# Patient Record
Sex: MALE | Race: WHITE | NOT HISPANIC OR LATINO | Employment: OTHER | URBAN - METROPOLITAN AREA
[De-identification: names, ages, dates, MRNs, and addresses within clinical notes are randomized per-mention and may not be internally consistent; named-entity substitution may affect disease eponyms.]

---

## 2017-01-05 ENCOUNTER — OFFICE VISIT (OUTPATIENT)
Dept: OPHTHALMOLOGY | Facility: CLINIC | Age: 67
End: 2017-01-05
Payer: MEDICARE

## 2017-01-05 DIAGNOSIS — H52.4 HYPEROPIA WITH ASTIGMATISM AND PRESBYOPIA, UNSPECIFIED LATERALITY: ICD-10-CM

## 2017-01-05 DIAGNOSIS — H52.209 HYPEROPIA WITH ASTIGMATISM AND PRESBYOPIA, UNSPECIFIED LATERALITY: ICD-10-CM

## 2017-01-05 DIAGNOSIS — H52.00 HYPEROPIA WITH ASTIGMATISM AND PRESBYOPIA, UNSPECIFIED LATERALITY: ICD-10-CM

## 2017-01-05 DIAGNOSIS — H25.13 NUCLEAR SCLEROSIS, BILATERAL: Primary | ICD-10-CM

## 2017-01-05 PROCEDURE — 92004 COMPRE OPH EXAM NEW PT 1/>: CPT | Mod: S$GLB,,, | Performed by: OPTOMETRIST

## 2017-01-05 PROCEDURE — 99999 PR PBB SHADOW E&M-EST. PATIENT-LVL I: CPT | Mod: PBBFAC,,, | Performed by: OPTOMETRIST

## 2017-01-05 PROCEDURE — 92015 DETERMINE REFRACTIVE STATE: CPT | Mod: S$GLB,,, | Performed by: OPTOMETRIST

## 2017-01-05 PROCEDURE — 99211 OFF/OP EST MAY X REQ PHY/QHP: CPT | Mod: PBBFAC,PO | Performed by: OPTOMETRIST

## 2017-01-05 NOTE — PROGRESS NOTES
HPI     Pts last exam was 4 years ago. PT c/o overall blurred va and wears gls   full time. Gls recently broke, would like to replace.   HPI    Any vision changes since last exam: decreased near va  Eye pain: no  Other ocular symptoms: no    Do you wear currently wear glasses or contacts? gls    Interested in contacts today? no    Do you plan on getting new glasses today? If needed         Last edited by Amirah Sy MA on 1/5/2017  2:31 PM.         Assessment /Plan     For exam results, see Encounter Report.    Nuclear sclerosis, bilateral  Very mild with excellent va OU  Surgery is not indicated at this time. Monitor 12 months.    Hyperopia with astigmatism and presbyopia, unspecified laterality  Eyeglass Final Rx     Eyeglass Final Rx      Sphere Cylinder Axis Add   Right +1.75 +0.50 180 +2.25   Left +2.00 +0.75 178 +2.25       Expiration Date:  1/6/2018            RTC 1 yr for dilated eye exam or PRN if any problems.   Discussed above and answered questions.

## 2017-03-13 ENCOUNTER — OFFICE VISIT (OUTPATIENT)
Dept: INTERNAL MEDICINE | Facility: CLINIC | Age: 67
End: 2017-03-13
Payer: MEDICARE

## 2017-03-13 ENCOUNTER — APPOINTMENT (OUTPATIENT)
Dept: RADIOLOGY | Facility: HOSPITAL | Age: 67
End: 2017-03-13
Attending: FAMILY MEDICINE
Payer: MEDICARE

## 2017-03-13 VITALS
WEIGHT: 207.25 LBS | TEMPERATURE: 98 F | HEART RATE: 91 BPM | OXYGEN SATURATION: 95 % | SYSTOLIC BLOOD PRESSURE: 119 MMHG | BODY MASS INDEX: 31.51 KG/M2 | DIASTOLIC BLOOD PRESSURE: 81 MMHG

## 2017-03-13 DIAGNOSIS — I42.9 CARDIOMYOPATHY, UNSPECIFIED TYPE: ICD-10-CM

## 2017-03-13 DIAGNOSIS — I48.20 ATRIAL FIBRILLATION, CHRONIC: ICD-10-CM

## 2017-03-13 DIAGNOSIS — M17.10 ARTHRITIS OF KNEE: Primary | ICD-10-CM

## 2017-03-13 DIAGNOSIS — M17.10 ARTHRITIS OF KNEE: ICD-10-CM

## 2017-03-13 PROCEDURE — 99213 OFFICE O/P EST LOW 20 MIN: CPT | Mod: S$GLB,,, | Performed by: FAMILY MEDICINE

## 2017-03-13 PROCEDURE — 73562 X-RAY EXAM OF KNEE 3: CPT | Mod: 26,LT,, | Performed by: RADIOLOGY

## 2017-03-13 PROCEDURE — 73560 X-RAY EXAM OF KNEE 1 OR 2: CPT | Mod: TC,59,PO,RT

## 2017-03-13 PROCEDURE — 73560 X-RAY EXAM OF KNEE 1 OR 2: CPT | Mod: 26,59,RT, | Performed by: RADIOLOGY

## 2017-03-13 PROCEDURE — 99999 PR PBB SHADOW E&M-EST. PATIENT-LVL III: CPT | Mod: PBBFAC,,, | Performed by: FAMILY MEDICINE

## 2017-03-13 RX ORDER — METHYLPREDNISOLONE 4 MG/1
TABLET ORAL
Qty: 1 PACKAGE | Refills: 0 | Status: SHIPPED | OUTPATIENT
Start: 2017-03-13 | End: 2017-04-03

## 2017-03-13 NOTE — MR AVS SNAPSHOT
[facility]  [address]  [place] [address]  Phone: [phone]  Fax: [phone]                  Ezio James   3/13/2017 2:30 PM   Office Visit    Description:  Male : 1950   Provider:  Darren Nova MD   Department:  [facility]           Reason for Visit     left knee           Diagnoses this Visit        Comments    Arthritis of knee    -  Primary     Cardiomyopathy, unspecified type         Atrial fibrillation, chronic                To Do List           Future Appointments        Provider Department Dept Phone    3/28/2017 2:30 PM Darren Nova MD [facility] [phone]      Goals (5 Years of Data)     None      Follow-Up and Disposition     Return in about 2 weeks (around 3/27/2017).    Follow-up and Disposition History       These Medications        Disp Refills Start End    methylPREDNISolone (MEDROL DOSEPACK) 4 mg tablet 1 Package 0 3/13/2017 4/3/2017    use as directed    Pharmacy: [facility] [address] [place] [address] AT [facility] Ph #: [phone]         [facility]     [facility] On Call Nurse [facility] Line -  Assistance  Registered nurses in the [facility] provide clinical advisement, health education, appointment booking, and other advisory services.  Call for this free service at [phone].             Medications           START taking these NEW medications        Refills    methylPREDNISolone (MEDROL DOSEPACK) 4 mg tablet 0    Sig: use as directed    Class: Normal           Verify that the below list of medications is an accurate representation of the medications you are currently taking.  If none reported, the list may be blank. If incorrect, please contact your healthcare provider. Carry this list with you in case of emergency.           Current Medications     levothyroxine (SYNTHROID) 75 MCG tablet Take 1 tablet (75 mcg total) by  mouth once daily.    lisinopril (PRINIVIL,ZESTRIL) 5 MG tablet TAKE 1 TABLET ONE TIME DAILY    metoprolol succinate (TOPROL-XL) 50 MG 24 hr tablet TAKE 1 TABLET EVERY DAY    multivitamin capsule Take 1 capsule by mouth once daily.    aspirin (ECOTRIN) 81 MG EC tablet Take 1 tablet (81 mg total) by mouth once daily.    methylPREDNISolone (MEDROL DOSEPACK) 4 mg tablet use as directed           Clinical Reference Information           Your Vitals Were     BP Pulse Temp Weight SpO2 BMI    119/81 (BP Location: Right arm, Patient Position: Sitting, BP Method: Automatic) 91 98.2 °F (36.8 °C) (Tympanic) 94 kg (207 lb 3.7 oz) 95% 31.51 kg/m2      Blood Pressure          Most Recent Value    BP  119/81      Allergies as of 3/13/2017     No Known Allergies      Immunizations Administered on Date of Encounter - 3/13/2017     None      Orders Placed During Today's Visit      Normal Orders This Visit    Ambulatory referral to Cardiology     Uric acid     Future Labs/Procedures Expected by Expires    X-ray Knee Ortho Left  3/13/2017 3/13/2018      [facility] Sign-Up     Activating your [facility] account is as easy as 1-2-3!     1) Visit [email or web address], select Sign Up Now, enter this activation code and your date of birth, then select Next.  [email or web address]  Expires: 4/27/2017  3:31 PM      2) Create a username and password to use when you visit [facility] in the future and select a security question in case you lose your password and select Next.    3) Enter your e-mail address and click Sign Up!    Additional Information  If you have questions, please e-mail [email or web address] or call [phone] to talk to our [facility] staff. Remember, [facility] is NOT to be used for urgent needs. For medical emergencies, dial 911.         Language Assistance Services     ATTENTION: Language assistance services are available, free of charge. Please call [phone].      ATENCIÓN: Si habla español, tiene a mancia disposición servicios  josesitoos de asistencia lingüística. Cosme jean-baptiste [phone].     SUZI Ý: N?u b?n nói Ti?ng Vi?t, có các d?ch v? h? tr? ngôn ng? mi?n phí dành cho b?n. G?i s? [phone].         [facility] complies with applicable Federal civil rights laws and does not discriminate on the basis of race, color, national origin, age, disability, or sex.

## 2017-03-13 NOTE — PROGRESS NOTES
Subjective:       Patient ID: Ezio James is a 66 y.o. male.    Chief Complaint: left knee    HPI Comments: 1 month duration of mild intermittent discomfort of the left knee.  Several days' duration of constant pain and swelling.  He denies any previous joint symptoms.  He is not on any diuretics.  He denies any trauma or increase usage.    Review of Systems   Musculoskeletal: Positive for arthralgias.        Pain and swelling of the left knee       Objective:      Physical Exam   Musculoskeletal:   Left knee is diffusely swollen and diffusely increased heat.  There is no redness.  Some mild discomfort with full extension.  There is no rotational pain.  There is no bruising.       Assessment:       1. Arthritis of knee    2. Cardiomyopathy, unspecified type    3. Atrial fibrillation, chronic        Plan:        Acute arthritis.  X-ray knee uric acid.  Medrol Dosepak back as well as ice packs twice a day.  Follow-up in 2 weeks.  He requested cardiology follow-up routine for cardiomyopathy and atrial fib

## 2017-03-14 LAB — URATE SERPL-MCNC: 7.2 MG/DL

## 2017-03-28 ENCOUNTER — OFFICE VISIT (OUTPATIENT)
Dept: INTERNAL MEDICINE | Facility: CLINIC | Age: 67
End: 2017-03-28
Payer: MEDICARE

## 2017-03-28 VITALS
TEMPERATURE: 98 F | WEIGHT: 204.5 LBS | SYSTOLIC BLOOD PRESSURE: 129 MMHG | OXYGEN SATURATION: 97 % | HEART RATE: 92 BPM | DIASTOLIC BLOOD PRESSURE: 91 MMHG | BODY MASS INDEX: 31.09 KG/M2

## 2017-03-28 DIAGNOSIS — I42.9 CARDIOMYOPATHY, UNSPECIFIED TYPE: ICD-10-CM

## 2017-03-28 DIAGNOSIS — I48.20 ATRIAL FIBRILLATION, CHRONIC: ICD-10-CM

## 2017-03-28 DIAGNOSIS — M10.071 ACUTE IDIOPATHIC GOUT OF RIGHT FOOT: Primary | ICD-10-CM

## 2017-03-28 DIAGNOSIS — E03.4 HYPOTHYROIDISM DUE TO ACQUIRED ATROPHY OF THYROID: ICD-10-CM

## 2017-03-28 PROCEDURE — 99213 OFFICE O/P EST LOW 20 MIN: CPT | Mod: S$GLB,,, | Performed by: FAMILY MEDICINE

## 2017-03-28 PROCEDURE — 99999 PR PBB SHADOW E&M-EST. PATIENT-LVL III: CPT | Mod: PBBFAC,,, | Performed by: FAMILY MEDICINE

## 2017-03-28 RX ORDER — COLCHICINE 0.6 MG/1
0.6 TABLET ORAL 2 TIMES DAILY
Qty: 60 TABLET | Refills: 2 | Status: SHIPPED | OUTPATIENT
Start: 2017-03-28 | End: 2017-07-05 | Stop reason: ALTCHOICE

## 2017-03-28 RX ORDER — METOPROLOL SUCCINATE 50 MG/1
50 TABLET, EXTENDED RELEASE ORAL DAILY
Qty: 90 TABLET | Refills: 3 | Status: SHIPPED | OUTPATIENT
Start: 2017-03-28 | End: 2018-03-29 | Stop reason: SDUPTHER

## 2017-03-28 RX ORDER — LISINOPRIL 5 MG/1
TABLET ORAL
Qty: 90 TABLET | Refills: 3 | Status: SHIPPED | OUTPATIENT
Start: 2017-03-28 | End: 2017-06-14 | Stop reason: ALTCHOICE

## 2017-03-28 RX ORDER — LEVOTHYROXINE SODIUM 75 UG/1
75 TABLET ORAL DAILY
Qty: 90 TABLET | Refills: 3 | Status: SHIPPED | OUTPATIENT
Start: 2017-03-28 | End: 2018-03-29 | Stop reason: SDUPTHER

## 2017-03-28 NOTE — PROGRESS NOTES
Subjective:       Patient ID: Ezio James is a 66 y.o. male.    Chief Complaint: 2wk follow up and right foot swollen and tender    HPI Comments: Left knee pain and swelling resolved prior to taking Medrol Dosepak.  He did complete the Medrol Dosepak.  Subsequently developed pain and swelling right foot.  Denies any trauma.    Review of Systems   Constitutional: Negative for chills and fever.   Respiratory: Negative for cough and shortness of breath.    Cardiovascular: Negative for chest pain and palpitations.   Musculoskeletal: Positive for arthralgias.   Skin: Negative for rash.       Objective:      Physical Exam   Constitutional: He appears well-developed and well-nourished.   Cardiovascular: Normal rate and normal heart sounds.    No murmur heard.  A  fib controlled rate   Pulmonary/Chest: Effort normal and breath sounds normal. No respiratory distress.   Musculoskeletal:   Left knee is without pain or swelling.  Is no redness.  Right first MTP joint is mildly tender mildly swollen with mild pain on range of motion.       Assessment:       1. Acute idiopathic gout of right foot    2. Hypothyroidism due to acquired atrophy of thyroid    3. Cardiomyopathy, unspecified type    4. Atrial fibrillation, chronic        Plan:    .  Uric acid was mildly elevated during acute attacks of gout.  Start colchicine 0.6 mg twice a day.  Ice to the right toe area twice a day.  Discussed gout Diet.  Follow-up in one month and check uric acid.  Consider allopurinol at that time.  Routine medications were refilled due to insurance change.

## 2017-03-28 NOTE — MR AVS SNAPSHOT
[facility]  [address] [address]  Phone: [phone]  Fax: [phone]                  Ezio James   3/28/2017 2:30 PM   Office Visit    Description:  Male : 1950   Provider:  Darren Nova MD   Department:  [facility]           Reason for Visit     2wk follow up     right foot swollen and tender           Diagnoses this Visit        Comments    Acute idiopathic gout of right foot    -  Primary     Hypothyroidism due to acquired atrophy of thyroid         Cardiomyopathy, unspecified type         Atrial fibrillation, chronic                To Do List           Future Appointments        Provider Department Dept Phone    2017 2:30 PM Darren Nova MD [facility] [phone]      Goals (5 Years of Data)     None      Follow-Up and Disposition     Return in about 1 month (around 2017).       These Medications        Disp Refills Start End    colchicine 0.6 mg tablet 60 tablet 2 3/28/2017 3/28/2018    Take 1 tablet (0.6 mg total) by mouth 2 (two) times daily. - Oral    Pharmacy: [id number] - [address] AT [facility] Ph #: [phone]       levothyroxine (SYNTHROID) 75 MCG tablet 90 tablet 3 3/28/2017 3/28/2018    Take 1 tablet (75 mcg total) by mouth once daily. - Oral    Pharmacy: [facility] - [address] Ph #: [phone]       lisinopril (PRINIVIL,ZESTRIL) 5 MG tablet 90 tablet 3 3/28/2017     TAKE 1 TABLET ONE TIME DAILY    Pharmacy: [facility] - [address] Ph #: [phone]       metoprolol succinate (TOPROL-XL) 50 MG 24 hr tablet 90 tablet 3 3/28/2017     Take 1 tablet (50 mg total) by mouth once daily. - Oral    Pharmacy: [facility] - [address] Ph #: [phone]         [facility]     [facility] Nurse Care Line - 24/7 Assistance  Registered nurses in the [facility] provide clinical advisement, health education, appointment booking, and other advisory services.  Call for this free service at [phone].             Medications           START taking these NEW medications        Refills    colchicine 0.6 mg tablet 2    Sig: Take 1 tablet (0.6 mg total) by mouth 2 (two) times daily.    Class: Normal    Route: Oral      CHANGE how you are taking these medications     Start Taking Instead of    metoprolol succinate (TOPROL-XL) 50 MG 24 hr tablet metoprolol succinate (TOPROL-XL) 50 MG 24 hr tablet    Dosage:  Take 1 tablet (50 mg total) by mouth once daily. Dosage:  TAKE 1 TABLET EVERY DAY    Reason for Change:  Reorder            Verify that the below list of medications is an accurate representation of the medications you are currently taking.  If none reported, the list may be blank. If incorrect, please contact your healthcare provider. Carry this list with you in case of emergency.           Current Medications     levothyroxine (SYNTHROID) 75 MCG tablet Take 1 tablet (75 mcg total) by mouth once daily.    lisinopril (PRINIVIL,ZESTRIL) 5 MG tablet TAKE 1 TABLET ONE TIME DAILY    metoprolol succinate (TOPROL-XL) 50 MG 24 hr tablet Take 1 tablet (50 mg total) by mouth once daily.    multivitamin capsule Take 1 capsule by mouth once daily.    aspirin (ECOTRIN) 81 MG EC tablet Take 1 tablet (81 mg total) by mouth once daily.    colchicine 0.6 mg tablet Take 1 tablet (0.6 mg total) by mouth 2 (two) times daily.    methylPREDNISolone (MEDROL DOSEPACK) 4 mg tablet use as directed           Clinical Reference Information           Your Vitals Were     BP Pulse Temp    129/91 (BP Location: Right arm, Patient Position: Sitting, BP Method: Automatic) 92 97.7 °F (36.5 °C) (Tympanic)    Weight SpO2 BMI    92.8 kg (204 lb 7.6 oz) 97% 31.09 kg/m2      Blood Pressure          Most Recent Value    BP   (!)  129/91      Allergies as of 3/28/2017     No Known Allergies      Immunizations Administered on Date of Encounter - 3/28/2017     None      [facility] Sign-Up     Activating your [facility] account is as easy as 1-2-3!     1) Visit [email or web address], select Sign Up Now, enter this activation code and your date of birth, then select Next.  [email or web address]  Expires: 4/27/2017  3:31 PM      2) Create a username and password to use when you visit [facility] in the future and select a security question in case you lose your password and select Next.    3) Enter your e-mail address and click Sign Up!    Additional Information  If you have questions, please e-mail [email or web address] or call [phone] to talk to our [facility] staff. Remember, [facility] is NOT to be used for urgent needs. For medical emergencies, dial 911.         Instructions      Gout Diet  Gout is a painful condition caused by an excess of uric acid, a waste product made by the body. Uric acid forms crystals that collect in the joints. The immune response to these crystals brings on symptoms of joint pain and swelling. This is called a gout attack. Often, medications and diet changes are combined to manage gout. Below are some guidelines for changing your diet to help you manage gout and prevent attacks. Your health care provider will help you determine the best eating plan for you.     Eating to manage gout  Weight loss for those who are overweight may help reduce gout attacks.  Eat less of these foods  Eating too many foods containing purines may raise the levels of uric acid in your body. This raises your risk for a gout attack. Try to limit these foods and drinks:  · Alcohol, such as beer and red wine. You may be told to avoid alcohol completely.  · Soft drinks that contain sugar or high fructose corn syrup  · Certain fish, including anchovies, sardines, fish eggs, and herring  · Shellfish  · Certain meats, such as red meat, hot dogs, luncheon  meats, and turkey  · Organ meats, such as liver, kidneys, and sweetbreads  · Legumes, such as dried beans and peas  · Other high fat foods such as gravy, whole milk, and high fat cheeses  · Vegetables such as asparagus, cauliflower, spinach, and mushrooms used to be thought to contribute to an increased risk for a gout attack, but recent studies show that high purine vegetables don't increase the risk for a gout attack.  Eat more of these foods  Other foods may be helpful for people with gout. Add some of these foods to your diet:  · Cherries contain chemicals that may lower uric acid.  · Omega fatty acids. These are found in some fatty fish such as salmon, certain oils (flax, olive, or nut), and nuts themselves. Omega fatty acids may help prevent inflammation due to gout.  · Dairy products that are low-fat or fat-free, such as cheese and yogurt  · Complex carbohydrate foods, including whole grains, brown rice, oats, and beans  · Coffee, in moderation  · Water, approximately 64 ounces per day  Follow-up care  Follow up with your healthcare provider as advised.  When to seek medical advice  Call your healthcare provider right away if any of these occur:  · Return of gout symptoms, usually at night:  · Severe pain, swelling, and heat in a joint, especially the base of the big toe  · Affected joint is hard to move  · Skin of the affected joint is purple or red  · Fever of 100.4°F (38°C) or higher  · Pain that doesn't get better even with prescribed medicine   Date Last Reviewed: 1/12/2016 © 2000-2016 [employer]. [address] [place] [address], [place] [address]. All rights reserved. This information is not intended as a substitute for professional medical care. Always follow your healthcare professional's instructions.             Language Assistance Services     ATTENTION: Language assistance services are available, free of charge. Please call [phone].      ATENCIÓN: ana Payne  disposición servicios gratuitos de asistencia lingüística. Cosme al [phone].     SUZI Ý: N?u b?n nói Ti?ng Vi?t, có các d?ch v? h? tr? ngôn ng? mi?n phí dành cho b?n. G?i s? [phone].         [facility] complies with applicable Federal civil rights laws and does not discriminate on the basis of race, color, national origin, age, disability, or sex.

## 2017-03-28 NOTE — PATIENT INSTRUCTIONS

## 2017-04-27 ENCOUNTER — OFFICE VISIT (OUTPATIENT)
Dept: INTERNAL MEDICINE | Facility: CLINIC | Age: 67
End: 2017-04-27
Payer: COMMERCIAL

## 2017-04-27 VITALS
HEIGHT: 68 IN | DIASTOLIC BLOOD PRESSURE: 80 MMHG | SYSTOLIC BLOOD PRESSURE: 120 MMHG | WEIGHT: 205.5 LBS | OXYGEN SATURATION: 97 % | BODY MASS INDEX: 31.14 KG/M2 | TEMPERATURE: 98 F | HEART RATE: 81 BPM

## 2017-04-27 DIAGNOSIS — M10.071 ACUTE IDIOPATHIC GOUT OF RIGHT FOOT: Primary | ICD-10-CM

## 2017-04-27 DIAGNOSIS — E03.9 HYPOTHYROIDISM, UNSPECIFIED TYPE: ICD-10-CM

## 2017-04-27 LAB
T4 FREE SERPL-MCNC: 1.05 NG/DL
TSH SERPL DL<=0.005 MIU/L-ACNC: 4.07 UIU/ML
URATE SERPL-MCNC: 7.9 MG/DL
URATE SERPL-MCNC: 7.9 MG/DL

## 2017-04-27 PROCEDURE — 1126F AMNT PAIN NOTED NONE PRSNT: CPT | Mod: S$GLB,,, | Performed by: FAMILY MEDICINE

## 2017-04-27 PROCEDURE — 84443 ASSAY THYROID STIM HORMONE: CPT

## 2017-04-27 PROCEDURE — 99999 PR PBB SHADOW E&M-EST. PATIENT-LVL III: CPT | Mod: PBBFAC,,, | Performed by: FAMILY MEDICINE

## 2017-04-27 PROCEDURE — 1160F RVW MEDS BY RX/DR IN RCRD: CPT | Mod: S$GLB,,, | Performed by: FAMILY MEDICINE

## 2017-04-27 PROCEDURE — 84550 ASSAY OF BLOOD/URIC ACID: CPT

## 2017-04-27 PROCEDURE — 1159F MED LIST DOCD IN RCRD: CPT | Mod: S$GLB,,, | Performed by: FAMILY MEDICINE

## 2017-04-27 PROCEDURE — 99213 OFFICE O/P EST LOW 20 MIN: CPT | Mod: S$GLB,,, | Performed by: FAMILY MEDICINE

## 2017-04-27 PROCEDURE — 84439 ASSAY OF FREE THYROXINE: CPT

## 2017-04-27 PROCEDURE — 1157F ADVNC CARE PLAN IN RCRD: CPT | Mod: 8P,S$GLB,, | Performed by: FAMILY MEDICINE

## 2017-04-27 RX ORDER — LANOLIN ALCOHOL/MO/W.PET/CERES
1000 CREAM (GRAM) TOPICAL DAILY
COMMUNITY

## 2017-04-27 NOTE — MR AVS SNAPSHOT
[facility]  [address] [place] [address]  [place] [address]  Phone: [phone]  Fax: [phone]                  Ezio James   2017 2:30 PM   Office Visit    Description:  Male : 1950   Provider:  Darren Nova MD   Department:  [facility]           Reason for Visit     Follow-up           Diagnoses this Visit        Comments    Acute idiopathic gout of right foot    -  Primary     Hypothyroidism, unspecified type                To Do List           Goals (5 Years of Data)     None      [facility] On Call     [facility] On Call Nurse Care Line -  Assistance  Unless otherwise directed by your provider, please contact [facility], our nurse care line that is available for  assistance.     Registered nurses in the [facility] On Call Center provide: appointment scheduling, clinical advisement, health education, and other advisory services.  Call: [phone] (toll free)               Medications                Verify that the below list of medications is an accurate representation of the medications you are currently taking.  If none reported, the list may be blank. If incorrect, please contact your healthcare provider. Carry this list with you in case of emergency.           Current Medications     colchicine 0.6 mg tablet Take 1 tablet (0.6 mg total) by mouth 2 (two) times daily.    cyanocobalamin (VITAMIN B-12) 1000 MCG tablet Take 100 mcg by mouth once daily.    levothyroxine (SYNTHROID) 75 MCG tablet Take 1 tablet (75 mcg total) by mouth once daily.    lisinopril (PRINIVIL,ZESTRIL) 5 MG tablet TAKE 1 TABLET ONE TIME DAILY    metoprolol succinate (TOPROL-XL) 50 MG 24 hr tablet Take 1 tablet (50 mg total) by mouth once daily.    multivitamin capsule Take 1 capsule by mouth once daily.    aspirin (ECOTRIN) 81 MG EC tablet Take 1 tablet (81 mg total) by mouth once daily.           Clinical Reference Information           Your Vitals Were     BP Pulse Temp Height  "Weight SpO2    120/80 81 97.7 °F (36.5 °C) (Oral) 5' 8" (1.727 m) 93.2 kg (205 lb 7.5 oz) 97%    BMI                31.24 kg/m2          Blood Pressure          Most Recent Value    BP  120/80      Allergies as of 4/27/2017     No Known Allergies      Immunizations Administered on Date of Encounter - 4/27/2017     None      Orders Placed During Today's Visit      Normal Orders This Visit    T4, free     TSH     Uric acid     Uric acid       [facility] Sign-Up     Activating your [facility] account is as easy as 1-2-3!     1) Visit [email or web address], select Sign Up Now, enter this activation code and your date of birth, then select Next.  [email or web address]  Expires: 4/27/2017  3:31 PM      2) Create a username and password to use when you visit [facility] in the future and select a security question in case you lose your password and select Next.    3) Enter your e-mail address and click Sign Up!    Additional Information  If you have questions, please e-mail [email or web address] or call [phone] to talk to our [facility] staff. Remember, [facility] is NOT to be used for urgent needs. For medical emergencies, dial 911.         Language Assistance Services     ATTENTION: Language assistance services are available, free of charge. Please call [phone].      ATENCIÓN: Si habla español, tiene a mancia disposición servicios gratuitos de asistencia lingüística. Llame al [phone].     [facility] Ý: N?u b?n nói Ti?ng Vi?t, có các d?ch v? h? tr? ngôn ng? mi?n phí dành cho b?n. G?i s? [phone].         [facility] complies with applicable Federal civil rights laws and does not discriminate on the basis of race, color, national origin, age, disability, or sex.        "

## 2017-04-27 NOTE — PROGRESS NOTES
Subjective:       Patient ID: Ezio James is a 66 y.o. male.    Chief Complaint: Follow-up    HPI Comments: Follow-up gout.   He reports no further significant joint swelling or joint pain or redness.  He completed one month of colchicine.    Review of Systems   Respiratory: Negative for cough and shortness of breath.    Cardiovascular: Negative for chest pain, palpitations and leg swelling.   Musculoskeletal: Negative for arthralgias.       Objective:      Physical Exam   Constitutional: He appears well-developed and well-nourished. No distress.   Eyes: Conjunctivae are normal.   Cardiovascular: Normal rate.    No murmur heard.  Atrial fib  with controlled rate   Pulmonary/Chest: Effort normal and breath sounds normal. No respiratory distress. He has no wheezes.   Musculoskeletal: Normal range of motion. He exhibits no tenderness or deformity.       Office Visit on 03/13/2017   Component Date Value Ref Range Status    Uric Acid 03/13/2017 7.2* 3.4 - 7.0 mg/dL Final     Assessment:       1. Acute idiopathic gout of right foot    2. Hypothyroidism, unspecified type        Plan:   Reports his last gout attack was about 2 years ago.  We'll check uric acid.  If significant resume colchicine and allopurinol.   Otherwise discuss gout diet.  Also he was to recheck TSH free T4.  Blood pressure controlled.  and follow up in 6 months      Acute idiopathic gout of right foot  -     Uric acid  -     Uric acid    Hypothyroidism, unspecified type  -     TSH  -     T4, free

## 2017-06-14 ENCOUNTER — OFFICE VISIT (OUTPATIENT)
Dept: CARDIOLOGY | Facility: CLINIC | Age: 67
End: 2017-06-14
Payer: MEDICARE

## 2017-06-14 VITALS
HEART RATE: 72 BPM | BODY MASS INDEX: 32.07 KG/M2 | HEIGHT: 68 IN | WEIGHT: 211.63 LBS | DIASTOLIC BLOOD PRESSURE: 88 MMHG | SYSTOLIC BLOOD PRESSURE: 134 MMHG

## 2017-06-14 DIAGNOSIS — I48.20 ATRIAL FIBRILLATION, CHRONIC: ICD-10-CM

## 2017-06-14 DIAGNOSIS — I10 ESSENTIAL HYPERTENSION: ICD-10-CM

## 2017-06-14 DIAGNOSIS — I48.20 CHRONIC ATRIAL FIBRILLATION: ICD-10-CM

## 2017-06-14 DIAGNOSIS — I48.91 ATRIAL FIBRILLATION, UNSPECIFIED TYPE: Primary | ICD-10-CM

## 2017-06-14 DIAGNOSIS — E78.6 LOW HDL (UNDER 40): ICD-10-CM

## 2017-06-14 DIAGNOSIS — I48.91 ATRIAL FIBRILLATION: ICD-10-CM

## 2017-06-14 DIAGNOSIS — I42.8 NONISCHEMIC CARDIOMYOPATHY: ICD-10-CM

## 2017-06-14 PROCEDURE — 99214 OFFICE O/P EST MOD 30 MIN: CPT | Mod: S$GLB,,, | Performed by: INTERNAL MEDICINE

## 2017-06-14 PROCEDURE — 93000 ELECTROCARDIOGRAM COMPLETE: CPT | Mod: S$GLB,,, | Performed by: INTERNAL MEDICINE

## 2017-06-14 PROCEDURE — 99999 PR PBB SHADOW E&M-EST. PATIENT-LVL III: CPT | Mod: PBBFAC,,, | Performed by: INTERNAL MEDICINE

## 2017-06-14 PROCEDURE — 1126F AMNT PAIN NOTED NONE PRSNT: CPT | Mod: S$GLB,,, | Performed by: INTERNAL MEDICINE

## 2017-06-14 PROCEDURE — 1159F MED LIST DOCD IN RCRD: CPT | Mod: S$GLB,,, | Performed by: INTERNAL MEDICINE

## 2017-06-14 RX ORDER — LISINOPRIL 10 MG/1
10 TABLET ORAL DAILY
Qty: 90 TABLET | Refills: 3 | Status: SHIPPED | OUTPATIENT
Start: 2017-06-14 | End: 2018-04-24 | Stop reason: SDUPTHER

## 2017-06-14 NOTE — PROGRESS NOTES
"Subjective:    Patient ID:  Ezio James is a 66 y.o. male who presents for evaluation of Atrial Fibrillation and Congestive Heart Failure      HPI  Pt presents for f/u.  Pt seen by Dr. Liu in past.  First visit with pt. Chart reviewed.  H/o HTN, low HDL.  Nonsmoker.  No h/o tia or cva.   Has h/o permanent a fib, on asa.  Per Dr. Liu's notes, anticoagulation discussed in past, pt preferred ASA qd.  stress MPI 2014, no ischemia per report.  Echo 2016 normal EF, LAE.  EF in past had been 40 - 45%.  States he feels tired more than he used to but states it could be age.  + fatigue with activity.  No cp sxs.  No dyspnea, no pnd/orthopnea.  No dizziness.  No syncope.  ecg today shows a fib with controlled VR.  No acute changes.  Has numbness in feet, tingling, chronic.      Review of Systems   Constitution: Positive for malaise/fatigue.   HENT: Negative.    Eyes: Negative.    Cardiovascular: Negative.    Respiratory: Negative.    Endocrine: Negative.    Hematologic/Lymphatic: Negative.    Skin: Negative.    Musculoskeletal: Negative.    Gastrointestinal: Negative.    Genitourinary: Negative.    Neurological: Positive for numbness.   Psychiatric/Behavioral: Negative.    Allergic/Immunologic: Negative.        /88 (BP Location: Right arm, Patient Position: Sitting, BP Method: Manual)   Pulse 72 Comment: radial  Ht 5' 8" (1.727 m)   Wt 96 kg (211 lb 10.3 oz)   BMI 32.18 kg/m²     Wt Readings from Last 3 Encounters:   06/14/17 96 kg (211 lb 10.3 oz)   04/27/17 93.2 kg (205 lb 7.5 oz)   03/28/17 92.8 kg (204 lb 7.6 oz)     Temp Readings from Last 3 Encounters:   04/27/17 97.7 °F (36.5 °C) (Oral)   03/28/17 97.7 °F (36.5 °C) (Tympanic)   03/13/17 98.2 °F (36.8 °C) (Tympanic)     BP Readings from Last 3 Encounters:   06/14/17 134/88   04/27/17 120/80   03/28/17 (!) 129/91     Pulse Readings from Last 3 Encounters:   06/14/17 72   04/27/17 81   03/28/17 92          Objective:    Physical Exam   Constitutional: He is " oriented to person, place, and time. He appears well-developed and well-nourished.   HENT:   Head: Normocephalic.   Neck: Normal range of motion. Neck supple. Normal carotid pulses, no hepatojugular reflux and no JVD present. Carotid bruit is not present. No thyromegaly present.   Cardiovascular: Normal rate, S1 normal and S2 normal.  An irregularly irregular rhythm present. PMI is not displaced.  Exam reveals no S3, no S4, no distant heart sounds, no friction rub, no midsystolic click and no opening snap.    No murmur heard.  Pulses:       Radial pulses are 2+ on the right side, and 2+ on the left side.   Pulmonary/Chest: Effort normal and breath sounds normal. He has no wheezes. He has no rales.   Abdominal: Soft. Bowel sounds are normal. He exhibits no distension, no abdominal bruit, no ascites and no mass. There is no tenderness.   Musculoskeletal: He exhibits no edema.   Neurological: He is alert and oriented to person, place, and time.   Skin: Skin is warm.   Psychiatric: He has a normal mood and affect. His behavior is normal.   Nursing note and vitals reviewed.      I have reviewed all pertinent labs and cardiac studies.      Chemistry        Component Value Date/Time     12/30/2016 1036    K 4.1 12/30/2016 1036     12/30/2016 1036    CO2 23 12/30/2016 1036    BUN 18 12/30/2016 1036    CREATININE 0.9 12/30/2016 1036    GLU 92 12/30/2016 1036        Component Value Date/Time    CALCIUM 9.1 12/30/2016 1036    ALKPHOS 58 12/30/2016 1036    AST 23 12/30/2016 1036    ALT 16 12/30/2016 1036    BILITOT 0.7 12/30/2016 1036        Lab Results   Component Value Date    WBC 5.97 12/30/2016    HGB 15.0 12/30/2016    HCT 41.8 12/30/2016    MCV 91 12/30/2016     12/30/2016     Lab Results   Component Value Date    CHOL 154 12/30/2016    CHOL 167 03/13/2014     Lab Results   Component Value Date    HDL 36 (L) 12/30/2016    HDL 41 03/13/2014     Lab Results   Component Value Date    LDLCALC 98.8  12/30/2016    LDLCALC 105.8 03/13/2014     Lab Results   Component Value Date    TRIG 96 12/30/2016    TRIG 101 03/13/2014     Lab Results   Component Value Date    CHOLHDL 23.4 12/30/2016    CHOLHDL 24.6 03/13/2014           Assessment:       1. Atrial fibrillation, unspecified type    2. Chronic atrial fibrillation    3. Atrial fibrillation, chronic    4. Nonischemic cardiomyopathy    5. Low HDL (under 40)    6. Atrial fibrillation    7. Essential hypertension         Plan:             PERMANENT A FIB WITH ELEVATED CHADS-VASC SCORE WARRANTING ANTICOAGULATION.  I DISCUSSED ANTICOAGULATION OPTIONS WITH PT TO INCLUDE WARFARIN VS NOAC, PROS/CONS, ALL RISKS/BENEFITS DISCUSSED, LACK OF ANTIDOTE FOR SOME NOAC DISCUSSED IN FULL DETAIL.  PT DECLINES ANTICOAGULATION; WANTS TO STAY ON ASA.    STABLE CARDIOMYOPATHY, UNCLEAR ETIOLOGY.  FATIGUE WITH EXERTION, NOT ACUTE.  MAY BE MULTIFACTORIAL IN ETIOLOGY (POOR SLEEP, A FIB, LACK OF EXERCISE, CHF, AGE) REEVALUATE FOR ISCHEMIA WITH STRESS TEST AND EF WITH ECHO.  INCREASE LISINOPRIL TO 10 MG DAILY FOR BP CONTROL AND CARDIOMYOPATHY.  NEEDS EXERCISE AND SOME WEIGHT LOSS.  CARDIAC DIET  PHONE REVIEW FOR TEST RESULTS    F/U 6 MONTHS.

## 2017-06-30 ENCOUNTER — CLINICAL SUPPORT (OUTPATIENT)
Dept: CARDIOLOGY | Facility: CLINIC | Age: 67
End: 2017-06-30
Payer: MEDICARE

## 2017-06-30 ENCOUNTER — HOSPITAL ENCOUNTER (OUTPATIENT)
Dept: RADIOLOGY | Facility: HOSPITAL | Age: 67
Discharge: HOME | End: 2017-06-30
Attending: INTERNAL MEDICINE
Payer: MEDICARE

## 2017-06-30 DIAGNOSIS — I48.20 ATRIAL FIBRILLATION, CHRONIC: ICD-10-CM

## 2017-06-30 DIAGNOSIS — I48.91 ATRIAL FIBRILLATION: ICD-10-CM

## 2017-06-30 DIAGNOSIS — I48.20 CHRONIC ATRIAL FIBRILLATION: ICD-10-CM

## 2017-06-30 DIAGNOSIS — I42.8 NONISCHEMIC CARDIOMYOPATHY: ICD-10-CM

## 2017-06-30 DIAGNOSIS — I10 ESSENTIAL HYPERTENSION: ICD-10-CM

## 2017-06-30 PROCEDURE — 78452 HT MUSCLE IMAGE SPECT MULT: CPT | Mod: 26,,, | Performed by: INTERNAL MEDICINE

## 2017-06-30 PROCEDURE — 93015 CV STRESS TEST SUPVJ I&R: CPT | Mod: S$GLB,,, | Performed by: INTERNAL MEDICINE

## 2017-06-30 PROCEDURE — 93306 TTE W/DOPPLER COMPLETE: CPT | Mod: S$GLB,,, | Performed by: INTERNAL MEDICINE

## 2017-07-01 LAB
DIASTOLIC DYSFUNCTION: NO
ESTIMATED PA SYSTOLIC PRESSURE: 36
MITRAL VALVE REGURGITATION: NORMAL
RETIRED EF AND QEF - SEE NOTES: 60 (ref 55–65)

## 2017-07-02 ENCOUNTER — TELEPHONE (OUTPATIENT)
Dept: CARDIOLOGY | Facility: HOSPITAL | Age: 67
End: 2017-07-02

## 2017-07-03 NOTE — TELEPHONE ENCOUNTER
The patient has been notified of this information and all questions answered.      Dr James, is there a time on wed or thurs that we can put him? The sched is full

## 2017-07-05 ENCOUNTER — OFFICE VISIT (OUTPATIENT)
Dept: CARDIOLOGY | Facility: CLINIC | Age: 67
End: 2017-07-05
Payer: MEDICARE

## 2017-07-05 VITALS
WEIGHT: 210.13 LBS | BODY MASS INDEX: 31.85 KG/M2 | HEIGHT: 68 IN | DIASTOLIC BLOOD PRESSURE: 72 MMHG | HEART RATE: 84 BPM | SYSTOLIC BLOOD PRESSURE: 128 MMHG

## 2017-07-05 DIAGNOSIS — R94.39 ABNORMAL STRESS TEST: Primary | ICD-10-CM

## 2017-07-05 DIAGNOSIS — I48.20 ATRIAL FIBRILLATION, CHRONIC: ICD-10-CM

## 2017-07-05 DIAGNOSIS — I10 ESSENTIAL HYPERTENSION: ICD-10-CM

## 2017-07-05 DIAGNOSIS — I42.8 NONISCHEMIC CARDIOMYOPATHY: ICD-10-CM

## 2017-07-05 DIAGNOSIS — I51.7 LVH (LEFT VENTRICULAR HYPERTROPHY): ICD-10-CM

## 2017-07-05 DIAGNOSIS — I42.9 CARDIOMYOPATHY, UNSPECIFIED TYPE: ICD-10-CM

## 2017-07-05 PROCEDURE — 99999 PR PBB SHADOW E&M-EST. PATIENT-LVL III: CPT | Mod: PBBFAC,,, | Performed by: INTERNAL MEDICINE

## 2017-07-05 PROCEDURE — 99215 OFFICE O/P EST HI 40 MIN: CPT | Mod: S$GLB,,, | Performed by: INTERNAL MEDICINE

## 2017-07-05 PROCEDURE — 1159F MED LIST DOCD IN RCRD: CPT | Mod: S$GLB,,, | Performed by: INTERNAL MEDICINE

## 2017-07-05 NOTE — PROGRESS NOTES
Subjective:    Patient ID:  Ezio James is a 66 y.o. male who presents for evaluation of Atrial Fibrillation; Hypertension; and Abnormal Stress Test      HPI Pt presents for f/u.  Pt seen by Dr. Liu in past.  My first clinic visit with pt was last month.   Prior details are as following:  H/o HTN, low HDL.  Nonsmoker.  No h/o tia or cva.   Has h/o permanent a fib, on asa.  Per Dr. Liu's notes, anticoagulation discussed in past, pt preferred ASA qd.  stress MPI 2014, no ischemia per report.  Echo 2016 normal EF, LAE.  EF in past had been 40 - 45%.  At last appt pt felt more tired with activity than in past.  No cp sxs. No dyspnea, pnd/orthopnea.  Has some foot numbness, chronic.   He declined anticoagulation last appt (coumadin and NOAC discussed), wanted to stay on asa.   Lisinopril increased last appt.       Patient Active Problem List   Diagnosis    Subclinical hypothyroidism    Atrial fibrillation, chronic    Cardiomyopathy    Nonischemic cardiomyopathy    Hypothyroidism    Hematuria    Paresthesia of both feet    Immunization deficiency    Tremor    Arthritis of knee    Acute idiopathic gout of right foot    Low HDL (under 40)    Essential hypertension    Abnormal stress test    LVH (left ventricular hypertrophy)     Past Medical History:   Diagnosis Date    Atrial fibrillation, chronic     Colon polyp 04,15    Essential hypertension 6/14/2017    Subclinical hypothyroidism        Current Outpatient Prescriptions:     aspirin (ECOTRIN) 81 MG EC tablet, Take 1 tablet (81 mg total) by mouth once daily., Disp: 90 tablet, Rfl: 2    cyanocobalamin (VITAMIN B-12) 1000 MCG tablet, Take 100 mcg by mouth once daily., Disp: , Rfl:     levothyroxine (SYNTHROID) 75 MCG tablet, Take 1 tablet (75 mcg total) by mouth once daily., Disp: 90 tablet, Rfl: 3    lisinopril 10 MG tablet, Take 1 tablet (10 mg total) by mouth once daily., Disp: 90 tablet, Rfl: 3    metoprolol succinate (TOPROL-XL) 50 MG 24 hr  "tablet, Take 1 tablet (50 mg total) by mouth once daily., Disp: 90 tablet, Rfl: 3    multivitamin capsule, Take 1 capsule by mouth once daily., Disp: , Rfl:     Review of Systems   Constitution: Positive for malaise/fatigue.   HENT: Negative.    Eyes: Negative.    Cardiovascular: Negative.    Respiratory: Negative.    Endocrine: Negative.    Hematologic/Lymphatic: Negative.    Skin: Negative.    Musculoskeletal: Negative.    Gastrointestinal: Negative.    Genitourinary: Negative.    Neurological: Positive for numbness.   Psychiatric/Behavioral: Negative.    Allergic/Immunologic: Negative.        /72 (BP Location: Left arm)   Pulse 84   Ht 5' 8" (1.727 m)   Wt 95.3 kg (210 lb 1.6 oz)   BMI 31.95 kg/m²     Wt Readings from Last 3 Encounters:   07/05/17 95.3 kg (210 lb 1.6 oz)   06/14/17 96 kg (211 lb 10.3 oz)   04/27/17 93.2 kg (205 lb 7.5 oz)     Temp Readings from Last 3 Encounters:   04/27/17 97.7 °F (36.5 °C) (Oral)   03/28/17 97.7 °F (36.5 °C) (Tympanic)   03/13/17 98.2 °F (36.8 °C) (Tympanic)     BP Readings from Last 3 Encounters:   07/05/17 128/72   06/14/17 134/88   04/27/17 120/80     Pulse Readings from Last 3 Encounters:   07/05/17 84   06/14/17 72   04/27/17 81            Objective:    Physical Exam   Constitutional: He is oriented to person, place, and time. He appears well-developed and well-nourished.   HENT:   Head: Normocephalic.   Neck: Normal range of motion. Neck supple. Normal carotid pulses, no hepatojugular reflux and no JVD present. Carotid bruit is not present. No thyromegaly present.   Cardiovascular: Normal rate, regular rhythm, S1 normal and S2 normal.  PMI is not displaced.  Exam reveals no S3, no S4, no distant heart sounds, no friction rub, no midsystolic click and no opening snap.    No murmur heard.  Pulses:       Radial pulses are 2+ on the right side, and 2+ on the left side.   Pulmonary/Chest: Effort normal and breath sounds normal. He has no wheezes. He has no rales. "   Abdominal: Soft. Bowel sounds are normal. He exhibits no distension, no abdominal bruit, no ascites and no mass. There is no tenderness.   Musculoskeletal: He exhibits no edema.   Neurological: He is alert and oriented to person, place, and time.   Skin: Skin is warm.   Psychiatric: He has a normal mood and affect. His behavior is normal.   Nursing note and vitals reviewed.      I have reviewed all pertinent labs and cardiac studies.      Chemistry        Component Value Date/Time     12/30/2016 1036    K 4.1 12/30/2016 1036     12/30/2016 1036    CO2 23 12/30/2016 1036    BUN 18 12/30/2016 1036    CREATININE 0.9 12/30/2016 1036    GLU 92 12/30/2016 1036        Component Value Date/Time    CALCIUM 9.1 12/30/2016 1036    ALKPHOS 58 12/30/2016 1036    AST 23 12/30/2016 1036    ALT 16 12/30/2016 1036    BILITOT 0.7 12/30/2016 1036    ESTGFRAFRICA >60.0 12/30/2016 1036    EGFRNONAA >60.0 12/30/2016 1036        Lab Results   Component Value Date    WBC 5.97 12/30/2016    HGB 15.0 12/30/2016    HCT 41.8 12/30/2016    MCV 91 12/30/2016     12/30/2016      5d ago   Diastolic Dysfunction No   Resulting Agency [facility]   Narrative     Date of Procedure: 06/30/2017    PRE-TEST DATA   EKG: Resting electrocardiogram reveals atrial fibrillation at a rate of 82 bpm.     TEST DESCRIPTION   The patient received 0.4 mg of Regadenoson as an IV bolus. Peak heart rate was 107 bpm, which is 70% of the age predicted maximum heart rate. .     EKG Conclusions:    1. The EKG portion of this study is negative for ischemia at a peak heart rate of 107 bpm (70% of predicted).   2. Blood pressure remained stable throughout the protocol  (Presenting BP: 121/89 Peak BP: 135/85).   3. No significant arrhythmias were present.   4. There were no symptoms of chest discomfort or significant dyspnea throughout the protocol.     Nuclear Procedure:  Following a single isotope protocol, 10.1 mCi of Tc99 labeled Tetrofosmin was given at rest  "and tomographic imaging was performed. Regadenoson pharmacologic stress testing was performed as described above. Immediately following the IV bolus of   regadenoson, 31 mCi of Tc99 labeled Tetrofosmin was given and tomographic imaging was performed. The site of the IV injection was the left AC. Images were obtained on a [employer] camera.     Comparison:  Study from 5/21/2014 demonstrated, "NORMAL MYOCARDIAL PERFUSION  1. The perfusion scan is free of evidence for myocardial ischemia or injury. The inferoseptal wall is obscured by bowel.  2. There is abnormal wall motion at rest showing mild hypokinesis of the  wall of the left ventricle.   3. There is resting LV dysfunction with a reduced ejection fraction of 35 %.   4. The ventricular volumes are normal at rest and stress.   5. The extracardiac distribution of radioactivity is normal.   6. There was no previous study available to compare.  ."     Comments:  This is a technically adequate study. Inspection of the transaxial images demonstrated no significant cranial, caudal, or lateral patient motion in the camera between rest and stress acquisitions. On stress images, there is mild decreased uptake of   radiotracer in the inferoapical wall of the left ventricle which reverses on resting images suggestive of ischemia. The remainder of the myocardium demonstrates normal radiotracer uptake. The extracardiac distribution of radioactivity is normal. The left   ventricular cavity is normal in size and does not increase with stress. On gated SPECT, left ventricular motion is normal at rest.     Nuclear Quantitative Functional Analysis:   LVEF: 54 %    Impression: ABNORMAL MYOCARDIAL PERFUSION  1. There is evidence for mild myocardial ischemia in the inferoapical wall of the left ventricle.   2. The perfusion scan is free of evidence for myocardial injury.   3. Resting wall motion is physiologic.   4. Resting LV function is normal.   5. The ventricular volumes are " normal at rest and stress.   6. The extracardiac distribution of radioactivity is normal.   7. When compared to the previous study from 05/21/2014,           This document has been electronically    SIGNED BY: Herbert Greene MD On: 07/01/2017 05:38        Date of Procedure: 06/30/2017        TEST DESCRIPTION       Aorta: The aortic root is normal in size, measuring 2.7 cm at sinotubular junction and 2.9 cm at Sinuses of Valsalva. The proximal ascending aorta is normal in size, measuring 3.1 cm across.     Left Atrium: The left atrial volume index is moderately enlarged, measuring 47.13 cc/m2.     Left Ventricle: The left ventricle is normal in size, with an end-diastolic diameter of 4.9 cm, and an end-systolic diameter of 3.6 cm. LV wall thickness is normal, with the septum measuring 1.4 cm and the posterior wall measuring 1.2 cm across. Relative   wall thickness was increased at 0.49, and the LV mass index was increased at 144.9 g/m2 consistent with concentric left ventricular hypertrophy. There are no regional wall motion abnormalities. Left ventricular systolic function appears normal. Visually   estimated ejection fraction is 60-65%. The LV Doppler derived stroke volume equals 49.0 ccs.     Diastolic indices: Diastolic function is indeterminate.     Right Atrium: The right atrium is normal in size, measuring 5.7 cm in length and 4.7 cm in width in the apical view.     Right Ventricle: The right ventricle is normal in size measuring 3.3 cm at the base in the apical right ventricle-focused view. Global right ventricular systolic function appears normal. The estimated PA systolic pressure is greater than 36 mmHg.     Aortic Valve:  The aortic valve is mildly sclerotic.     Mitral Valve:  There is mild mitral regurgitation.     Intracavitary: There is no evidence of pericardial effusion, intracavity mass, thrombi, or vegetation.         CONCLUSIONS     1 - Moderate left atrial enlargement.     2 - Concentric  hypertrophy.     3 - No wall motion abnormalities.     4 - Normal left ventricular systolic function (EF 60-65%).     5 - Indeterminate LV diastolic function.     6 - Normal right ventricular systolic function .     7 - The estimated PA systolic pressure is greater than 36 mmHg.     8 - Mild mitral regurgitation.             This document has been electronically    SIGNED BY: Herbert Greene MD On: 07/01/2017 06:09        Assessment:       1. Abnormal stress test    2. Essential hypertension    3. Cardiomyopathy, unspecified type    4. Atrial fibrillation, chronic    5. Nonischemic cardiomyopathy    6. LVH (left ventricular hypertrophy)         Plan:             LONG DISCUSSION WITH PT ON ABNL STRESS MPI.  OPTIONS DISCUSSED.  DISCUSSED LHC FOR DEFINITIVE EVALUATION OF HIS H/O CARDIOMYOPATHY AND TO RULE OUT CAD AS ETIOLOGY OF IT AND FATIGUE.  HE HAS NO TYPICAL ANGINAL SXS AND NO OVERT CHF SXS.  I DISCUSSED INDICATIONS FOR LHC +  PCI, RISKS/BENEFITS OF PROCEDURES.  ALSO DISCUSSED OPTION OF MONITORING PT, REPEAT STRESS TEST IN FUTURE TO REASSESS CURRENT ABNORMALITY.  ALL TEST RESULTS REVIEWED WITH PT IN DETAIL.  PT PREFERS NOT TO PROCEED WITH LHC.  HE WANTS TO KEEP ON CURRENT MEDS, OBSERVE FOR ANY WORSENING SXS FOR NOW.  HE WANTS TO STAY ON ASA QD FOR HIS A FIB.  WILL CONTINUE CURRENT MEDS.  HE WILL CALL ME SHOULD ANY ANGINA DEVELOP OR OTHER WORSENING SXS.    F/U 6 MONTHS.

## 2017-10-05 ENCOUNTER — OFFICE VISIT (OUTPATIENT)
Dept: INTERNAL MEDICINE | Facility: CLINIC | Age: 67
End: 2017-10-05
Payer: MEDICARE

## 2017-10-05 ENCOUNTER — APPOINTMENT (OUTPATIENT)
Dept: RADIOLOGY | Facility: HOSPITAL | Age: 67
End: 2017-10-05
Attending: FAMILY MEDICINE
Payer: MEDICARE

## 2017-10-05 VITALS
DIASTOLIC BLOOD PRESSURE: 82 MMHG | SYSTOLIC BLOOD PRESSURE: 126 MMHG | BODY MASS INDEX: 30.29 KG/M2 | TEMPERATURE: 98 F | HEART RATE: 65 BPM | WEIGHT: 199.88 LBS | HEIGHT: 68 IN | OXYGEN SATURATION: 97 %

## 2017-10-05 DIAGNOSIS — R05.9 COUGH: Primary | ICD-10-CM

## 2017-10-05 DIAGNOSIS — R05.9 COUGH: ICD-10-CM

## 2017-10-05 PROCEDURE — 99999 PR PBB SHADOW E&M-EST. PATIENT-LVL III: CPT | Mod: PBBFAC,,, | Performed by: FAMILY MEDICINE

## 2017-10-05 PROCEDURE — 71020 XR CHEST PA AND LATERAL: CPT | Mod: TC,PO

## 2017-10-05 PROCEDURE — 71020 XR CHEST PA AND LATERAL: CPT | Mod: 26,,, | Performed by: RADIOLOGY

## 2017-10-05 PROCEDURE — 96372 THER/PROPH/DIAG INJ SC/IM: CPT | Mod: S$GLB,,, | Performed by: FAMILY MEDICINE

## 2017-10-05 PROCEDURE — 99213 OFFICE O/P EST LOW 20 MIN: CPT | Mod: 25,S$GLB,, | Performed by: FAMILY MEDICINE

## 2017-10-05 RX ORDER — CODEINE PHOSPHATE AND GUAIFENESIN 10; 100 MG/5ML; MG/5ML
5 SOLUTION ORAL 3 TIMES DAILY PRN
Qty: 118 ML | Refills: 0 | Status: SHIPPED | OUTPATIENT
Start: 2017-10-05 | End: 2017-10-15

## 2017-10-05 RX ORDER — DEXAMETHASONE SODIUM PHOSPHATE 100 MG/10ML
10 INJECTION INTRAMUSCULAR; INTRAVENOUS ONCE
Status: COMPLETED | OUTPATIENT
Start: 2017-10-05 | End: 2017-10-05

## 2017-10-05 RX ORDER — AMOXICILLIN 500 MG/1
500 CAPSULE ORAL 3 TIMES DAILY
Qty: 30 CAPSULE | Refills: 0 | Status: SHIPPED | OUTPATIENT
Start: 2017-10-05 | End: 2017-10-17 | Stop reason: ALTCHOICE

## 2017-10-05 RX ADMIN — DEXAMETHASONE SODIUM PHOSPHATE 10 MG: 100 INJECTION INTRAMUSCULAR; INTRAVENOUS at 04:10

## 2017-10-05 NOTE — PROGRESS NOTES
Subjective:       Patient ID: Ezio James is a 66 y.o. male.    Chief Complaint: Cough and Chest Congestion    2 weeks duration of cough.  Initial nasal congestion is improved.  Denies fever chills.  Denies shortness of breath.  Denies nausea diarrhea.  Reports one grandchild strep throat recently.      Cough   Pertinent negatives include no chest pain, chills, fever, sore throat, shortness of breath or wheezing.     Review of Systems   Constitutional: Positive for fatigue. Negative for chills and fever.   HENT: Negative for congestion, sinus pressure and sore throat.    Respiratory: Positive for cough. Negative for shortness of breath and wheezing.    Cardiovascular: Negative for chest pain, palpitations and leg swelling.   Gastrointestinal: Negative for diarrhea and nausea.       Objective:      Physical Exam   Constitutional: He appears well-nourished. No distress.   HENT:   Right Ear: External ear normal.   Left Ear: External ear normal.   Nose: Nose normal.   Mouth/Throat: Oropharynx is clear and moist.   Eyes: Conjunctivae are normal.   Cardiovascular: Normal rate.    Controlled atrial fibrillation   Pulmonary/Chest: Effort normal. No respiratory distress. He has no wheezes.   Mild rales in the left lower to mid lung field   Lymphadenopathy:     He has no cervical adenopathy.       Clinical Support on 06/30/2017   Component Date Value Ref Range Status    Diastolic Dysfunction 07/01/2017 No   Final     Assessment:       1. Cough        Plan:   cxr poss infiltrate LLL  Decadron 10 mg IM, guaifenesin with codeine, amoxicillin.  Follow-up in one week      Cough  -     X-Ray Chest PA And Lateral; Future; Expected date: 10/05/2017

## 2017-10-17 ENCOUNTER — OFFICE VISIT (OUTPATIENT)
Dept: INTERNAL MEDICINE | Facility: CLINIC | Age: 67
End: 2017-10-17
Payer: MEDICARE

## 2017-10-17 VITALS
BODY MASS INDEX: 28.58 KG/M2 | TEMPERATURE: 98 F | HEART RATE: 68 BPM | OXYGEN SATURATION: 98 % | DIASTOLIC BLOOD PRESSURE: 78 MMHG | SYSTOLIC BLOOD PRESSURE: 108 MMHG | WEIGHT: 193 LBS | HEIGHT: 69 IN

## 2017-10-17 DIAGNOSIS — Z28.39 IMMUNIZATION DEFICIENCY: ICD-10-CM

## 2017-10-17 DIAGNOSIS — E03.9 HYPOTHYROIDISM, UNSPECIFIED TYPE: ICD-10-CM

## 2017-10-17 DIAGNOSIS — I48.20 ATRIAL FIBRILLATION, CHRONIC: ICD-10-CM

## 2017-10-17 DIAGNOSIS — R05.9 COUGH: Primary | ICD-10-CM

## 2017-10-17 LAB
T4 FREE SERPL-MCNC: 1.19 NG/DL
TSH SERPL DL<=0.005 MIU/L-ACNC: 0.33 UIU/ML

## 2017-10-17 PROCEDURE — 99213 OFFICE O/P EST LOW 20 MIN: CPT | Mod: S$GLB,,, | Performed by: FAMILY MEDICINE

## 2017-10-17 PROCEDURE — 90662 IIV NO PRSV INCREASED AG IM: CPT | Mod: S$GLB,,, | Performed by: FAMILY MEDICINE

## 2017-10-17 PROCEDURE — 84443 ASSAY THYROID STIM HORMONE: CPT

## 2017-10-17 PROCEDURE — 84439 ASSAY OF FREE THYROXINE: CPT

## 2017-10-17 PROCEDURE — G0008 ADMIN INFLUENZA VIRUS VAC: HCPCS | Mod: S$GLB,,, | Performed by: FAMILY MEDICINE

## 2017-10-17 PROCEDURE — 99999 PR PBB SHADOW E&M-EST. PATIENT-LVL IV: CPT | Mod: PBBFAC,,, | Performed by: FAMILY MEDICINE

## 2017-10-17 NOTE — PROGRESS NOTES
Subjective:       Patient ID: Ezio James is a 66 y.o. male.    Chief Complaint: Follow-up    Follow-up cough possible pneumonia.  He feels better.  He denies fever chills.  He has less cough.  He still has some fatigue.      Review of Systems   Constitutional: Positive for fatigue. Negative for chills and fever.   HENT: Positive for congestion.    Respiratory: Positive for cough. Negative for shortness of breath and wheezing.    Cardiovascular: Negative for chest pain, palpitations and leg swelling.       Objective:      Physical Exam   Constitutional: He appears well-developed and well-nourished. No distress.   HENT:   Right Ear: External ear normal.   Left Ear: External ear normal.   Eyes: Conjunctivae are normal.   Cardiovascular: Normal rate.    No murmur heard.  iregular rhythm   Pulmonary/Chest: Effort normal. No respiratory distress. He has no wheezes.   Mild dry rales left base   Lymphadenopathy:     He has no cervical adenopathy.       Clinical Support on 06/30/2017   Component Date Value Ref Range Status    Diastolic Dysfunction 07/01/2017 No   Final     Assessment:       1. Hypothyroidism, unspecified type    2. Immunization deficiency        Plan:   Chest x-ray was reviewed with patient.  Complete antibiotic.  Continue over-the-counter cough medicine as needed.  Follow-up TSH free T4 was done today.  Flu vac's given today.  Routine follow-up in 6 months      Hypothyroidism, unspecified type  -     T4, free  -     TSH    Immunization deficiency  -     Influenza - High Dose (65+) (PF) (IM)

## 2017-10-18 DIAGNOSIS — E03.9 HYPOTHYROIDISM, UNSPECIFIED TYPE: Primary | ICD-10-CM

## 2017-10-20 ENCOUNTER — TELEPHONE (OUTPATIENT)
Dept: INTERNAL MEDICINE | Facility: CLINIC | Age: 67
End: 2017-10-20

## 2017-10-20 NOTE — TELEPHONE ENCOUNTER
----- Message from Fiordaliza Cueva sent at 10/20/2017  8:42 AM CDT -----  Pt is requesting a call from nurse in regards to lab results.      Please call pt back at [phone]

## 2017-10-24 ENCOUNTER — TELEPHONE (OUTPATIENT)
Dept: INTERNAL MEDICINE | Facility: CLINIC | Age: 67
End: 2017-10-24

## 2017-10-24 NOTE — TELEPHONE ENCOUNTER
----- Message from Fiordaliza Cueva sent at 10/24/2017 12:04 PM CDT -----  Pt is requesting a call from nurse in regards to lab results.          Please call pt back at [phone]

## 2017-10-26 ENCOUNTER — TELEPHONE (OUTPATIENT)
Dept: INTERNAL MEDICINE | Facility: CLINIC | Age: 67
End: 2017-10-26

## 2017-10-26 NOTE — TELEPHONE ENCOUNTER
----- Message from Alexandra Goins sent at 10/26/2017  2:54 PM CDT -----  Contact: pt  He's returning a missed call, please advise [phone] (home)

## 2018-03-29 DIAGNOSIS — I42.9 CARDIOMYOPATHY, UNSPECIFIED TYPE: ICD-10-CM

## 2018-03-29 DIAGNOSIS — E03.4 HYPOTHYROIDISM DUE TO ACQUIRED ATROPHY OF THYROID: ICD-10-CM

## 2018-03-29 RX ORDER — METOPROLOL SUCCINATE 50 MG/1
TABLET, EXTENDED RELEASE ORAL
Qty: 90 TABLET | Refills: 3 | Status: SHIPPED | OUTPATIENT
Start: 2018-03-29 | End: 2019-05-17 | Stop reason: SDUPTHER

## 2018-03-29 RX ORDER — LEVOTHYROXINE SODIUM 75 UG/1
TABLET ORAL
Qty: 90 TABLET | Refills: 3 | Status: SHIPPED | OUTPATIENT
Start: 2018-03-29 | End: 2019-03-21 | Stop reason: SDUPTHER

## 2018-04-10 ENCOUNTER — PATIENT OUTREACH (OUTPATIENT)
Dept: ADMINISTRATIVE | Facility: HOSPITAL | Age: 68
End: 2018-04-10

## 2018-04-10 NOTE — LETTER
April 10, 2018    Ezio James  [address]  [place] [address]             [facility]  [address]  [place] [address]  Phone: [phone] Dear Mr. James:    [facility] is committed to your overall health.  To help you get the most out of each of your visits, we will review your information to make sure you are up to date on all of your recommended tests and/or procedures.      Darren Nova MD has found that you may be due for   Health Maintenance Due   Topic    TETANUS VACCINE     Zoster Vaccine     Lipid Panel         If you have had any of the above done at another facility, please bring the records or information with you so that your record at [facility] will be complete.    If you are currently taking medication, please bring it with you to your appointment for review.    We will be happy to assist you with scheduling any necessary appointments or you may contact the [facility] appointment desk at [phone] to schedule at your convenience.     Thank you for choosing [facility] for your healthcare needs,    Comfort VALIENTE LPN Care Coordinator  [facility]  [phone]

## 2018-04-24 ENCOUNTER — OFFICE VISIT (OUTPATIENT)
Dept: INTERNAL MEDICINE | Facility: CLINIC | Age: 68
End: 2018-04-24
Payer: MEDICARE

## 2018-04-24 VITALS
SYSTOLIC BLOOD PRESSURE: 116 MMHG | WEIGHT: 205.81 LBS | HEART RATE: 86 BPM | TEMPERATURE: 97 F | OXYGEN SATURATION: 96 % | DIASTOLIC BLOOD PRESSURE: 80 MMHG | BODY MASS INDEX: 30.48 KG/M2 | HEIGHT: 69 IN

## 2018-04-24 DIAGNOSIS — Z12.5 SCREENING FOR PROSTATE CANCER: ICD-10-CM

## 2018-04-24 DIAGNOSIS — E03.4 HYPOTHYROIDISM DUE TO ACQUIRED ATROPHY OF THYROID: ICD-10-CM

## 2018-04-24 DIAGNOSIS — I42.9 CARDIOMYOPATHY, UNSPECIFIED TYPE: ICD-10-CM

## 2018-04-24 DIAGNOSIS — R25.1 TREMOR: ICD-10-CM

## 2018-04-24 DIAGNOSIS — I10 ESSENTIAL HYPERTENSION: Primary | ICD-10-CM

## 2018-04-24 DIAGNOSIS — R20.2 PARESTHESIA OF BOTH FEET: ICD-10-CM

## 2018-04-24 DIAGNOSIS — I51.7 LVH (LEFT VENTRICULAR HYPERTROPHY): ICD-10-CM

## 2018-04-24 DIAGNOSIS — I48.20 ATRIAL FIBRILLATION, CHRONIC: ICD-10-CM

## 2018-04-24 LAB
ALBUMIN SERPL BCP-MCNC: 4.2 G/DL
ALP SERPL-CCNC: 60 U/L
ALT SERPL W/O P-5'-P-CCNC: 18 U/L
ANION GAP SERPL CALC-SCNC: 11 MMOL/L
AST SERPL-CCNC: 25 U/L
BILIRUB SERPL-MCNC: 0.9 MG/DL
BILIRUB UR QL STRIP: NEGATIVE
BUN SERPL-MCNC: 15 MG/DL
CALCIUM SERPL-MCNC: 9.6 MG/DL
CHLORIDE SERPL-SCNC: 103 MMOL/L
CHOLEST SERPL-MCNC: 187 MG/DL
CHOLEST/HDLC SERPL: 4.3 {RATIO}
CLARITY UR REFRACT.AUTO: ABNORMAL
CO2 SERPL-SCNC: 25 MMOL/L
COLOR UR AUTO: YELLOW
COMPLEXED PSA SERPL-MCNC: 0.34 NG/ML
CREAT SERPL-MCNC: 0.8 MG/DL
EST. GFR  (AFRICAN AMERICAN): >60 ML/MIN/1.73 M^2
EST. GFR  (NON AFRICAN AMERICAN): >60 ML/MIN/1.73 M^2
GLUCOSE SERPL-MCNC: 73 MG/DL
GLUCOSE UR QL STRIP: NEGATIVE
HDLC SERPL-MCNC: 43 MG/DL
HDLC SERPL: 23 %
HGB UR QL STRIP: NEGATIVE
KETONES UR QL STRIP: NEGATIVE
LDLC SERPL CALC-MCNC: 102.4 MG/DL
LEUKOCYTE ESTERASE UR QL STRIP: NEGATIVE
MICROSCOPIC COMMENT: NORMAL
NITRITE UR QL STRIP: NEGATIVE
NONHDLC SERPL-MCNC: 144 MG/DL
PH UR STRIP: 5 [PH] (ref 5–8)
POTASSIUM SERPL-SCNC: 4.4 MMOL/L
PROT SERPL-MCNC: 7.9 G/DL
PROT UR QL STRIP: NEGATIVE
SODIUM SERPL-SCNC: 139 MMOL/L
SP GR UR STRIP: 1.02 (ref 1–1.03)
T4 FREE SERPL-MCNC: 1.02 NG/DL
TRIGL SERPL-MCNC: 208 MG/DL
TSH SERPL DL<=0.005 MIU/L-ACNC: 6.04 UIU/ML
URN SPEC COLLECT METH UR: ABNORMAL
UROBILINOGEN UR STRIP-ACNC: NEGATIVE EU/DL
VIT B12 SERPL-MCNC: 653 PG/ML

## 2018-04-24 PROCEDURE — 84439 ASSAY OF FREE THYROXINE: CPT

## 2018-04-24 PROCEDURE — 81001 URINALYSIS AUTO W/SCOPE: CPT

## 2018-04-24 PROCEDURE — 80061 LIPID PANEL: CPT

## 2018-04-24 PROCEDURE — 82607 VITAMIN B-12: CPT

## 2018-04-24 PROCEDURE — 99999 PR PBB SHADOW E&M-EST. PATIENT-LVL III: CPT | Mod: PBBFAC,,, | Performed by: FAMILY MEDICINE

## 2018-04-24 PROCEDURE — 84153 ASSAY OF PSA TOTAL: CPT

## 2018-04-24 PROCEDURE — 3074F SYST BP LT 130 MM HG: CPT | Mod: S$GLB,,, | Performed by: FAMILY MEDICINE

## 2018-04-24 PROCEDURE — 84443 ASSAY THYROID STIM HORMONE: CPT

## 2018-04-24 PROCEDURE — 80053 COMPREHEN METABOLIC PANEL: CPT

## 2018-04-24 PROCEDURE — 99214 OFFICE O/P EST MOD 30 MIN: CPT | Mod: S$GLB,,, | Performed by: FAMILY MEDICINE

## 2018-04-24 PROCEDURE — 3079F DIAST BP 80-89 MM HG: CPT | Mod: S$GLB,,, | Performed by: FAMILY MEDICINE

## 2018-04-24 RX ORDER — LISINOPRIL 10 MG/1
10 TABLET ORAL DAILY
Qty: 90 TABLET | Refills: 3 | Status: SHIPPED | OUTPATIENT
Start: 2018-04-24 | End: 2019-05-23 | Stop reason: SDUPTHER

## 2018-04-24 NOTE — PROGRESS NOTES
Subjective:       Patient ID: Ezio James is a 67 y.o. male.    Chief Complaint: Follow-up    Annual exam.  Follow-up hypothyroidism hypertension atrial fibrillation cardiomyopathy LVH per stages of feet and tremor.  He is also followed by cardiology.  He denies headache chest pain and palpitation shortness of breath or edema.  He has occasional mild tremor of the hand when writing especially after drinking coffee.  He has a mild numb feeling somewhat intermittent of both feet.      Review of Systems   Constitutional: Negative for activity change, appetite change, fatigue and unexpected weight change.   HENT: Negative for congestion, ear pain, hearing loss, sneezing, sore throat, tinnitus and trouble swallowing.    Eyes: Negative for pain, redness and visual disturbance.   Respiratory: Negative for cough, chest tightness, shortness of breath and wheezing.    Cardiovascular: Negative for chest pain, palpitations and leg swelling.   Gastrointestinal: Negative for abdominal distention, abdominal pain, blood in stool, constipation, diarrhea, nausea, rectal pain and vomiting.   Endocrine: Negative for polydipsia and polyuria.   Genitourinary: Negative for difficulty urinating, dysuria, frequency, hematuria and urgency.   Musculoskeletal: Negative for arthralgias, back pain, joint swelling, myalgias, neck pain and neck stiffness.   Skin: Negative for rash and wound.   Neurological: Positive for numbness. Negative for dizziness, tremors, syncope, weakness, light-headedness and headaches.   Hematological: Negative for adenopathy. Does not bruise/bleed easily.   Psychiatric/Behavioral: Negative for agitation, confusion, dysphoric mood and sleep disturbance. The patient is not nervous/anxious.        Objective:      Physical Exam   Constitutional: He is oriented to person, place, and time. He appears well-developed and well-nourished.   HENT:   Right Ear: External ear normal.   Left Ear: External ear normal.   Nose: Nose  normal.   Mouth/Throat: Oropharynx is clear and moist.   Eyes: Conjunctivae and EOM are normal. Pupils are equal, round, and reactive to light.   Neck: Normal range of motion. Neck supple. No JVD present. No thyromegaly present.   Cardiovascular: Normal rate and intact distal pulses.  Exam reveals no gallop and no friction rub.    No murmur heard.  Atrial fibrillation controlled rate  Good capillary Refilling of the toes   Pulmonary/Chest: Effort normal and breath sounds normal. No respiratory distress. He has no wheezes. He has no rales.   Abdominal: Soft. Bowel sounds are normal. He exhibits no distension and no mass. There is no tenderness.   Musculoskeletal: Normal range of motion. He exhibits no edema or tenderness.   Lymphadenopathy:     He has no cervical adenopathy.   Neurological: He is alert and oriented to person, place, and time. He has normal reflexes. He displays normal reflexes. A sensory deficit is present. No cranial nerve deficit. He exhibits normal muscle tone. Coordination normal.   Decreased pinprick sensation of distal plantar feet also he does have some feeling of those areas  no tremors seen with outstretched hands   Skin: Skin is warm and dry. No rash noted.   Psychiatric: He has a normal mood and affect. His behavior is normal. Judgment and thought content normal.       Office Visit on 10/17/2017   Component Date Value Ref Range Status    Free T4 10/17/2017 1.19  0.71 - 1.51 ng/dL Final    TSH 10/17/2017 0.330* 0.400 - 4.000 uIU/mL Final     Assessment:       1. Essential hypertension    2. Hypothyroidism due to acquired atrophy of thyroid    3. Atrial fibrillation, chronic    4. Cardiomyopathy, unspecified type    5. LVH (left ventricular hypertrophy)    6. Paresthesia of both feet    7. Screening for prostate cancer        Plan:     Blood pressures controlled.  Atrial fibrillation is controlled rate.  Lab was ordered.  Observation and paresthesias of the feet and observation on mild  tremor of the hands.  L-spine is reviewed.  Discussed need for Zostavax at the pharmacy.  Follow-up in 6 months    Essential hypertension  -     Urinalysis  -     Comprehensive metabolic panel  -     Lipid panel  -     lisinopril 10 MG tablet; Take 1 tablet (10 mg total) by mouth once daily.  Dispense: 90 tablet; Refill: 3    Hypothyroidism due to acquired atrophy of thyroid  -     T4, free  -     TSH    Atrial fibrillation, chronic    Cardiomyopathy, unspecified type    LVH (left ventricular hypertrophy)    Paresthesia of both feet  -     Vitamin B12    Screening for prostate cancer  -     PSA, Screening    Other orders  -     Cancel: Lipid panel; Future; Expected date: 04/10/2018  -     Cancel: Comprehensive metabolic panel; Future; Expected date: 04/10/2018

## 2018-04-25 ENCOUNTER — TELEPHONE (OUTPATIENT)
Dept: INTERNAL MEDICINE | Facility: CLINIC | Age: 68
End: 2018-04-25

## 2018-04-25 NOTE — TELEPHONE ENCOUNTER
----- Message from Zuleima Doe sent at 4/25/2018  3:42 PM CDT -----  Contact: Pt   Pt retuning call, request call back .[phone] (home)

## 2018-04-25 NOTE — TELEPHONE ENCOUNTER
----- Message from Ena Beaulieu sent at 4/25/2018  1:23 PM CDT -----  Contact: self [phone]  Returning call, please call back at [phone].  Md Kristine

## 2018-05-04 ENCOUNTER — OFFICE VISIT (OUTPATIENT)
Dept: CARDIOLOGY | Facility: CLINIC | Age: 68
End: 2018-05-04
Payer: MEDICARE

## 2018-05-04 VITALS
WEIGHT: 203.94 LBS | SYSTOLIC BLOOD PRESSURE: 114 MMHG | BODY MASS INDEX: 30.21 KG/M2 | DIASTOLIC BLOOD PRESSURE: 82 MMHG | HEIGHT: 69 IN | HEART RATE: 84 BPM

## 2018-05-04 DIAGNOSIS — I51.7 LVH (LEFT VENTRICULAR HYPERTROPHY): ICD-10-CM

## 2018-05-04 DIAGNOSIS — I48.20 ATRIAL FIBRILLATION, CHRONIC: ICD-10-CM

## 2018-05-04 DIAGNOSIS — E78.6 LOW HDL (UNDER 40): ICD-10-CM

## 2018-05-04 DIAGNOSIS — E78.5 DYSLIPIDEMIA: ICD-10-CM

## 2018-05-04 DIAGNOSIS — I50.22 CHRONIC SYSTOLIC CONGESTIVE HEART FAILURE: ICD-10-CM

## 2018-05-04 DIAGNOSIS — I42.9 CARDIOMYOPATHY, UNSPECIFIED TYPE: ICD-10-CM

## 2018-05-04 DIAGNOSIS — Z01.810 PREOP CARDIOVASCULAR EXAM: Primary | ICD-10-CM

## 2018-05-04 DIAGNOSIS — I48.20 CHRONIC ATRIAL FIBRILLATION: ICD-10-CM

## 2018-05-04 DIAGNOSIS — I10 BENIGN ESSENTIAL HTN: ICD-10-CM

## 2018-05-04 DIAGNOSIS — R94.39 ABNORMAL STRESS TEST: Primary | ICD-10-CM

## 2018-05-04 DIAGNOSIS — R06.09 DOE (DYSPNEA ON EXERTION): ICD-10-CM

## 2018-05-04 PROCEDURE — 3079F DIAST BP 80-89 MM HG: CPT | Mod: S$GLB,,, | Performed by: INTERNAL MEDICINE

## 2018-05-04 PROCEDURE — 3074F SYST BP LT 130 MM HG: CPT | Mod: S$GLB,,, | Performed by: INTERNAL MEDICINE

## 2018-05-04 PROCEDURE — 3008F BODY MASS INDEX DOCD: CPT | Mod: S$GLB,,, | Performed by: INTERNAL MEDICINE

## 2018-05-04 PROCEDURE — 99999 PR PBB SHADOW E&M-EST. PATIENT-LVL III: CPT | Mod: PBBFAC,,, | Performed by: INTERNAL MEDICINE

## 2018-05-04 PROCEDURE — 99215 OFFICE O/P EST HI 40 MIN: CPT | Mod: S$GLB,,, | Performed by: INTERNAL MEDICINE

## 2018-05-04 RX ORDER — ROSUVASTATIN CALCIUM 10 MG/1
10 TABLET, COATED ORAL NIGHTLY
Qty: 90 TABLET | Refills: 3 | Status: SHIPPED | OUTPATIENT
Start: 2018-05-04 | End: 2019-04-22 | Stop reason: SDUPTHER

## 2018-05-04 NOTE — PROGRESS NOTES
Subjective:    Patient ID:  Ezio James is a 67 y.o. male who presents for evaluation of Atrial Fibrillation; Hypertension; Congestive Heart Failure; and Hyperlipidemia      HPI Pt presents for f/u.  He has HTN, low HDL, cardiomyopathy, permanent a fib.   He declined anticoagulation multiple times in past (coumadin and NOAC discussed), wanted to stay on asa.   Pt declined [facility] 2017 for abnl stress MPI 6/17 mild inferoapical ischemia.  His echo and stress MPI 6/17 showed EF to be normal.  2014 echo 40 - 45%, stress MPI 2014 35%  Here for f/u.  He denies cp sxs.   Some chronic DENNISON.  BP stable on current meds.  HDL improved, TG worse.   No exercise.  Put on weight 10 pounds.  Mindful of diet.  No tia/cva sxs.  Drinks 4- 6 cokes daily.  Has tremor writing.      Current Outpatient Prescriptions:     aspirin (ECOTRIN) 81 MG EC tablet, Take 1 tablet (81 mg total) by mouth once daily., Disp: 90 tablet, Rfl: 2    cyanocobalamin (VITAMIN B-12) 1000 MCG tablet, Take 100 mcg by mouth once daily., Disp: , Rfl:     levothyroxine (SYNTHROID) 75 MCG tablet, TAKE 1 TABLET DAILY, Disp: 90 tablet, Rfl: 3    lisinopril 10 MG tablet, Take 1 tablet (10 mg total) by mouth once daily., Disp: 90 tablet, Rfl: 3    metoprolol succinate (TOPROL-XL) 50 MG 24 hr tablet, TAKE 1 TABLET DAILY, Disp: 90 tablet, Rfl: 3    multivitamin capsule, Take 1 capsule by mouth once daily., Disp: , Rfl:     rosuvastatin (CRESTOR) 10 MG tablet, Take 1 tablet (10 mg total) by mouth every evening., Disp: 90 tablet, Rfl: 3        Review of Systems   Constitution: Negative.   HENT: Negative.    Eyes: Negative.    Cardiovascular: Positive for dyspnea on exertion.   Respiratory: Positive for shortness of breath.    Endocrine: Negative.    Hematologic/Lymphatic: Negative.    Skin: Negative.    Musculoskeletal: Negative.    Gastrointestinal: Negative.    Genitourinary: Negative.    Neurological: Positive for tremors.   Psychiatric/Behavioral: Negative.   "  Allergic/Immunologic: Negative.        /82 (BP Location: Left arm)   Pulse 84   Ht 5' 9" (1.753 m)   Wt 92.5 kg (203 lb 14.8 oz)   BMI 30.11 kg/m²        Objective:    Physical Exam   Constitutional: He is oriented to person, place, and time. He appears well-developed and well-nourished.   HENT:   Head: Normocephalic.   Neck: Normal range of motion. Neck supple. Normal carotid pulses, no hepatojugular reflux and no JVD present. Carotid bruit is not present. No thyromegaly present.   Cardiovascular: Normal rate, regular rhythm, S1 normal and S2 normal.  PMI is not displaced.  Exam reveals no S3, no S4, no distant heart sounds, no friction rub, no midsystolic click and no opening snap.    No murmur heard.  Pulses:       Radial pulses are 2+ on the right side, and 2+ on the left side.   Pulmonary/Chest: Effort normal and breath sounds normal. He has no wheezes. He has no rales.   Abdominal: Soft. Bowel sounds are normal. He exhibits no distension, no abdominal bruit, no ascites and no mass. There is no tenderness.   Musculoskeletal: He exhibits no edema.   Neurological: He is alert and oriented to person, place, and time.   Skin: Skin is warm.   Psychiatric: He has a normal mood and affect. His behavior is normal.   Nursing note and vitals reviewed.      I have reviewed all pertinent labs and cardiac studies.      Chemistry        Component Value Date/Time     04/24/2018 1351    K 4.4 04/24/2018 1351     04/24/2018 1351    CO2 25 04/24/2018 1351    BUN 15 04/24/2018 1351    CREATININE 0.8 04/24/2018 1351    GLU 73 04/24/2018 1351        Component Value Date/Time    CALCIUM 9.6 04/24/2018 1351    ALKPHOS 60 04/24/2018 1351    AST 25 04/24/2018 1351    ALT 18 04/24/2018 1351    BILITOT 0.9 04/24/2018 1351    ESTGFRAFRICA >60.0 04/24/2018 1351    EGFRNONAA >60.0 04/24/2018 1351        Lab Results   Component Value Date    WBC 5.97 12/30/2016    HGB 15.0 12/30/2016    HCT 41.8 12/30/2016    MCV 91 " 12/30/2016     12/30/2016     No results found for: LABA1C, HGBA1C  Lab Results   Component Value Date    CHOL 187 04/24/2018    CHOL 154 12/30/2016    CHOL 167 03/13/2014     Lab Results   Component Value Date    HDL 43 04/24/2018    HDL 36 (L) 12/30/2016    HDL 41 03/13/2014     Lab Results   Component Value Date    LDLCALC 102.4 04/24/2018    LDLCALC 98.8 12/30/2016    LDLCALC 105.8 03/13/2014     Lab Results   Component Value Date    TRIG 208 (H) 04/24/2018    TRIG 96 12/30/2016    TRIG 101 03/13/2014     Lab Results   Component Value Date    CHOLHDL 23.0 04/24/2018    CHOLHDL 23.4 12/30/2016    CHOLHDL 24.6 03/13/2014           Assessment:       1. Abnormal stress test    2. LVH (left ventricular hypertrophy)    3. Low HDL (under 40)    4. Cardiomyopathy, unspecified type    5. Atrial fibrillation, chronic    6. Dyslipidemia    7. DENNISON (dyspnea on exertion)    8. Chronic systolic congestive heart failure         Plan:             Long discussion with pt on his cardiomyopathy issues and + stress MPI last year.  Indications for LHC discussed, risks/benefits discussed.  Pt would benefit from cath to rule out significant underlying CAD as etiology of his CHF and + stress MPI.  Some conflicting data on tests, EF 35 - 40%, improved on studies last year.  ? Etiology of CHF.  Pt agreeable to proceeding.  Understands well.   At this point in time, he would seem to have silent ischemia although is DENNISON may be anginal equivalent although this does not seem to probably be the case.  He needs to cut back on his Coke, likely contributes to tremor.  Add Rosuvastatin 10 mg nightly for lipids.  Indications, side effects discussed.  Recheck lipids 2 months.  Continue other meds.      Pt advised to undergo left heart catheterization with possible PCI if warranted.  Pt advised of all risks and benefits of procedure.  Pt advised of alternative approaches and treatment strategies to include medical therapy, PCI as well as  surgical revascularization with possible CABG.  All questions answered in clinic.     Procedure scheduled Thurs, 5/31/18 8:30     Right radial approach.

## 2018-05-23 ENCOUNTER — TELEPHONE (OUTPATIENT)
Dept: CARDIOLOGY | Facility: CLINIC | Age: 68
End: 2018-05-23

## 2018-05-23 NOTE — TELEPHONE ENCOUNTER
Reschedule cath to Thurs 6/21/18 8:30 am.   Please update epic schedule and let cath lab know.  Thanks    Dr James

## 2018-05-23 NOTE — TELEPHONE ENCOUNTER
I called cath lab spoke with Eden pt's heart cath has been rescheduled to 6/21/18 8:30am pt was instructed to come for 7:00am. Pt voiced understanding.    Pt's procedure has been updated in epic.

## 2018-05-23 NOTE — TELEPHONE ENCOUNTER
----- Message from Mable Elkins sent at 5/23/2018  9:09 AM CDT -----  he wants to have his ekg & lab r/s a wk prior to his cathlab appt  ----- Message -----  From: Jordyn Spain MA  Sent: 5/23/2018   8:56 AM  To: Elly Rodriguez, Mable Elkins     Hi,   Appt desk can reschedule labs and ekg for pt's.  ----- Message -----  From: Mable Elkins  Sent: 5/23/2018   8:39 AM  To: Jacob WOLFF Staff    needs to r/s 5/24 & 5/31 appt..[phone] (home)

## 2018-05-23 NOTE — TELEPHONE ENCOUNTER
Returned pt call states he scheduled for heartcath on 5/31/18 states he is currently out of town and wont be back until 6/2/18. Please advise on what day you will like pt to have heart cath rescheduled to.

## 2018-06-14 ENCOUNTER — CLINICAL SUPPORT (OUTPATIENT)
Dept: CARDIOLOGY | Facility: CLINIC | Age: 68
End: 2018-06-14
Payer: MEDICARE

## 2018-06-14 ENCOUNTER — LAB VISIT (OUTPATIENT)
Dept: LAB | Facility: HOSPITAL | Age: 68
End: 2018-06-14
Attending: INTERNAL MEDICINE
Payer: MEDICARE

## 2018-06-14 DIAGNOSIS — E78.5 DYSLIPIDEMIA: ICD-10-CM

## 2018-06-14 DIAGNOSIS — I48.20 CHRONIC ATRIAL FIBRILLATION: ICD-10-CM

## 2018-06-14 DIAGNOSIS — I48.91 ATRIAL FIBRILLATION: Primary | ICD-10-CM

## 2018-06-14 DIAGNOSIS — Z01.810 PREOP CARDIOVASCULAR EXAM: ICD-10-CM

## 2018-06-14 DIAGNOSIS — I51.7 LVH (LEFT VENTRICULAR HYPERTROPHY): ICD-10-CM

## 2018-06-14 DIAGNOSIS — I10 BENIGN ESSENTIAL HTN: ICD-10-CM

## 2018-06-14 LAB
ALBUMIN SERPL BCP-MCNC: 4 G/DL
ALP SERPL-CCNC: 59 U/L
ALT SERPL W/O P-5'-P-CCNC: 15 U/L
ANION GAP SERPL CALC-SCNC: 5 MMOL/L
ANION GAP SERPL CALC-SCNC: 5 MMOL/L
APTT BLDCRRT: 22.7 SEC
AST SERPL-CCNC: 23 U/L
BASOPHILS # BLD AUTO: 0.05 K/UL
BASOPHILS NFR BLD: 0.6 %
BILIRUB SERPL-MCNC: 1.5 MG/DL
BUN SERPL-MCNC: 17 MG/DL
BUN SERPL-MCNC: 17 MG/DL
CALCIUM SERPL-MCNC: 9.5 MG/DL
CALCIUM SERPL-MCNC: 9.5 MG/DL
CHLORIDE SERPL-SCNC: 105 MMOL/L
CHLORIDE SERPL-SCNC: 105 MMOL/L
CHOLEST SERPL-MCNC: 96 MG/DL
CHOLEST/HDLC SERPL: 2.7 {RATIO}
CO2 SERPL-SCNC: 30 MMOL/L
CO2 SERPL-SCNC: 30 MMOL/L
CREAT SERPL-MCNC: 0.9 MG/DL
CREAT SERPL-MCNC: 0.9 MG/DL
DIFFERENTIAL METHOD: ABNORMAL
EOSINOPHIL # BLD AUTO: 0.2 K/UL
EOSINOPHIL NFR BLD: 2.5 %
ERYTHROCYTE [DISTWIDTH] IN BLOOD BY AUTOMATED COUNT: 12.6 %
EST. GFR  (AFRICAN AMERICAN): >60 ML/MIN/1.73 M^2
EST. GFR  (AFRICAN AMERICAN): >60 ML/MIN/1.73 M^2
EST. GFR  (NON AFRICAN AMERICAN): >60 ML/MIN/1.73 M^2
EST. GFR  (NON AFRICAN AMERICAN): >60 ML/MIN/1.73 M^2
GLUCOSE SERPL-MCNC: 93 MG/DL
GLUCOSE SERPL-MCNC: 93 MG/DL
HCT VFR BLD AUTO: 44.8 %
HDLC SERPL-MCNC: 35 MG/DL
HDLC SERPL: 36.5 %
HGB BLD-MCNC: 14.5 G/DL
IMM GRANULOCYTES # BLD AUTO: 0.03 K/UL
IMM GRANULOCYTES NFR BLD AUTO: 0.4 %
INR PPP: 1
LDLC SERPL CALC-MCNC: 43.6 MG/DL
LYMPHOCYTES # BLD AUTO: 2 K/UL
LYMPHOCYTES NFR BLD: 22.9 %
MCH RBC QN AUTO: 31.9 PG
MCHC RBC AUTO-ENTMCNC: 32.4 G/DL
MCV RBC AUTO: 99 FL
MONOCYTES # BLD AUTO: 0.6 K/UL
MONOCYTES NFR BLD: 6.4 %
NEUTROPHILS # BLD AUTO: 5.8 K/UL
NEUTROPHILS NFR BLD: 67.2 %
NONHDLC SERPL-MCNC: 61 MG/DL
NRBC BLD-RTO: 0 /100 WBC
PLATELET # BLD AUTO: 273 K/UL
PMV BLD AUTO: 10.9 FL
POTASSIUM SERPL-SCNC: 4.7 MMOL/L
POTASSIUM SERPL-SCNC: 4.7 MMOL/L
PROT SERPL-MCNC: 7.4 G/DL
PROTHROMBIN TIME: 10.1 SEC
RBC # BLD AUTO: 4.54 M/UL
SODIUM SERPL-SCNC: 140 MMOL/L
SODIUM SERPL-SCNC: 140 MMOL/L
TRIGL SERPL-MCNC: 87 MG/DL
WBC # BLD AUTO: 8.55 K/UL

## 2018-06-14 PROCEDURE — 36415 COLL VENOUS BLD VENIPUNCTURE: CPT

## 2018-06-14 PROCEDURE — 85730 THROMBOPLASTIN TIME PARTIAL: CPT

## 2018-06-14 PROCEDURE — 85025 COMPLETE CBC W/AUTO DIFF WBC: CPT

## 2018-06-14 PROCEDURE — 80053 COMPREHEN METABOLIC PANEL: CPT

## 2018-06-14 PROCEDURE — 93000 ELECTROCARDIOGRAM COMPLETE: CPT | Mod: S$GLB,,, | Performed by: NUCLEAR MEDICINE

## 2018-06-14 PROCEDURE — 80061 LIPID PANEL: CPT

## 2018-06-14 PROCEDURE — 85610 PROTHROMBIN TIME: CPT

## 2018-06-21 ENCOUNTER — HOSPITAL ENCOUNTER (OUTPATIENT)
Facility: HOSPITAL | Age: 68
Discharge: HOME | End: 2018-06-21
Attending: INTERNAL MEDICINE | Admitting: INTERNAL MEDICINE
Payer: MEDICARE

## 2018-06-21 ENCOUNTER — SURGERY (OUTPATIENT)
Age: 68
End: 2018-06-21

## 2018-06-21 VITALS
HEART RATE: 69 BPM | DIASTOLIC BLOOD PRESSURE: 84 MMHG | WEIGHT: 203 LBS | RESPIRATION RATE: 16 BRPM | SYSTOLIC BLOOD PRESSURE: 119 MMHG | BODY MASS INDEX: 30.07 KG/M2 | TEMPERATURE: 98 F | HEIGHT: 69 IN | OXYGEN SATURATION: 97 %

## 2018-06-21 DIAGNOSIS — R94.39 ABNORMAL NUCLEAR STRESS TEST: ICD-10-CM

## 2018-06-21 DIAGNOSIS — I50.43 ACUTE ON CHRONIC COMBINED SYSTOLIC AND DIASTOLIC CONGESTIVE HEART FAILURE: ICD-10-CM

## 2018-06-21 PROCEDURE — C1769 GUIDE WIRE: HCPCS

## 2018-06-21 PROCEDURE — 25000003 PHARM REV CODE 250

## 2018-06-21 PROCEDURE — 63600175 PHARM REV CODE 636 W HCPCS

## 2018-06-21 PROCEDURE — 93458 L HRT ARTERY/VENTRICLE ANGIO: CPT | Mod: 26,,, | Performed by: INTERNAL MEDICINE

## 2018-06-21 PROCEDURE — 99152 MOD SED SAME PHYS/QHP 5/>YRS: CPT | Mod: ,,, | Performed by: INTERNAL MEDICINE

## 2018-06-21 RX ORDER — ASPIRIN 325 MG
325 TABLET ORAL ONCE
Status: COMPLETED | OUTPATIENT
Start: 2018-06-21 | End: 2018-06-21

## 2018-06-21 RX ORDER — DIPHENHYDRAMINE HCL 50 MG
50 CAPSULE ORAL ONCE
Status: COMPLETED | OUTPATIENT
Start: 2018-06-21 | End: 2018-06-21

## 2018-06-21 RX ORDER — SODIUM CHLORIDE 9 MG/ML
INJECTION, SOLUTION INTRAVENOUS CONTINUOUS
Status: DISCONTINUED | OUTPATIENT
Start: 2018-06-21 | End: 2018-06-21 | Stop reason: HOSPADM

## 2018-06-21 RX ORDER — DIAZEPAM 5 MG/1
5 TABLET ORAL
Status: COMPLETED | OUTPATIENT
Start: 2018-06-21 | End: 2018-06-21

## 2018-06-21 RX ADMIN — Medication 325 MG: at 06:06

## 2018-06-21 RX ADMIN — Medication 50 MG: at 06:06

## 2018-06-21 RX ADMIN — SODIUM CHLORIDE: 9 INJECTION, SOLUTION INTRAVENOUS at 06:06

## 2018-06-21 RX ADMIN — DIAZEPAM 5 MG: 5 TABLET ORAL at 06:06

## 2018-06-21 NOTE — DISCHARGE INSTRUCTIONS
"Post-op Heart Catheterization    1. DIET: It is advisable for you to follow a diet that limits the intake of salt, sugar, saturated fats and cholesterol.     2. DRIVING: Due to sedation you received during your procedure, DO NOT drive or operate machinery for 24 hours. Avoid making critical decisions or signing legal documents until tomorrow.    3. ACTIVITY: AVOID activities that require bending of the affected arm/wrist for 3 days and submerging the site in water for 3 days. REMOVE the dressing the day after the procedure and shower. After shower apply bandaid to site.  Wear armboard until tomorrow evening. You may RESUME  your normal activities or prescribed exercise program as instructed by your physician after 3 days.                                                                                                          4. WOUND CARE: It is not unusual to have a small amount of bruising to appear at or near the puncture site. It is also common to have a tender "knot" develop beneath the skin at the puncture site of the wrist/arm. This is usually scar tissue and is not a cause for concern or alarm. This tender knot may take several weeks to fully resolve. The bruise will usually spread over several days. However, if the lump gets bigger, call your doctor immediately.    5. DISCOMFORT: For general discomfort at the puncture site, you may take 1 or 2 Acetaminophen (Tylenol) tablets every 4 - 6 hours as needed. (Do not take more than 4000 mg a day)    6. SIGNS AND SYMPTOMS TO REPORT:  Call your physician immediately if any of the following occur:                                            1. Loss of feeling, warmth or color to the affected arm/wrist                                                                                                          2. Mild beeding from the site                 3. Pain that is sudden, sharp or persistent in the affected arm/wrist                 4. Swelling or a change in "lump" " size, increased redness or drainage at  the puncture site                                                                               5. High fever (101 degrees or higher)    7. GO TO  THE EMERGENCY ROOM OR CALL 911 IF YOU HAVE: Chest pains or discomforts not relieved with 3 nitroglycerin doses (sublingual tablets or spray), numbness or severe pain of limb, if your limb becomes cold or discolored or if you develop uncontrolled bleeding from the puncture site (quickly apply firm, direct pressure above the site).

## 2018-06-21 NOTE — H&P
[facility] -   Cardiology  History and Physical     Patient Name: Ezio James  MRN: [id number]  Admission Date: 6/21/2018  Code Status: No Order   Attending Provider: Leon James MD   Primary Care Physician: Darren Nova MD  Principal Problem:<principal problem not specified>    Patient information was obtained from patient and ER records.     Subjective:         HPI:  Pt presents for elective LHC.  He has HTN, low HDL, cardiomyopathy, permanent a fib.   He declined anticoagulation multiple times in past (coumadin and NOAC discussed), wanted to stay on asa.   Pt declined [facility] 2017 for abnl stress MPI 6/17 mild inferoapical ischemia.  His echo and stress MPI 6/17 showed EF to be normal.  2014 echo 40 - 45%, stress MPI 2014 35%  He denies cp sxs.   Some chronic DENNISON.    Past Medical History:   Diagnosis Date    Atrial fibrillation, chronic     Colon polyp 04,15    Essential hypertension 6/14/2017    Subclinical hypothyroidism        Past Surgical History:   Procedure Laterality Date    COLONOSCOPY  2004,5/15    with polyps per Chart       Review of patient's allergies indicates:  No Known Allergies    No current facility-administered medications on file prior to encounter.      Current Outpatient Prescriptions on File Prior to Encounter   Medication Sig    aspirin (ECOTRIN) 81 MG EC tablet Take 1 tablet (81 mg total) by mouth once daily.    levothyroxine (SYNTHROID) 75 MCG tablet TAKE 1 TABLET DAILY    lisinopril 10 MG tablet Take 1 tablet (10 mg total) by mouth once daily.    metoprolol succinate (TOPROL-XL) 50 MG 24 hr tablet TAKE 1 TABLET DAILY    multivitamin capsule Take 1 capsule by mouth once daily.    rosuvastatin (CRESTOR) 10 MG tablet Take 1 tablet (10 mg total) by mouth every evening.    cyanocobalamin (VITAMIN B-12) 1000 MCG tablet Take 100 mcg by mouth once daily.     Family History     Problem Relation (Age of Onset)    Cataracts Mother    Emphysema Father    Heart disease  Father    Hypertension Mother    Lung cancer Mother    Obesity Mother        Social History Main Topics    Smoking status: Never Smoker    Smokeless tobacco: Never Used    Alcohol use Yes      Comment: Rarely    Drug use: No    Sexual activity: Yes     Partners: Female     Birth control/ protection: None     Review of Systems   Constitution: Negative.   HENT: Negative.    Eyes: Negative.    Cardiovascular: Positive for dyspnea on exertion.   Respiratory: Positive for shortness of breath.    Endocrine: Negative.    Hematologic/Lymphatic: Negative.    Skin: Negative.    Musculoskeletal: Negative.    Gastrointestinal: Negative.    Genitourinary: Negative.    Neurological: Negative.    Psychiatric/Behavioral: Negative.    Allergic/Immunologic: Negative.      Objective:     Vital Signs (Most Recent):    Vital Signs (24h Range):           There is no height or weight on file to calculate BMI.            No intake or output data in the 24 hours ending 06/21/18 0631    Lines/Drains/Airways          No matching active lines, drains, or airways          Wt Readings from Last 3 Encounters:   05/04/18 92.5 kg (203 lb 14.8 oz)   04/24/18 93.3 kg (205 lb 12.8 oz)   10/17/17 87.5 kg (193 lb 0.2 oz)     Temp Readings from Last 3 Encounters:   04/24/18 97.1 °F (36.2 °C) (Oral)   10/17/17 97.7 °F (36.5 °C) (Oral)   10/05/17 98.1 °F (36.7 °C) (Oral)     BP Readings from Last 3 Encounters:   05/04/18 114/82   04/24/18 116/80   10/17/17 108/78     Pulse Readings from Last 3 Encounters:   05/04/18 84   04/24/18 86   10/17/17 68         Physical Exam   Constitutional: He is oriented to person, place, and time. He appears well-developed and well-nourished.   HENT:   Head: Normocephalic.   Neck: Normal range of motion. Neck supple. Normal carotid pulses, no hepatojugular reflux and no JVD present. Carotid bruit is not present. No thyromegaly present.   Cardiovascular: Normal rate, regular rhythm, S1 normal and S2 normal.  PMI is not  displaced.  Exam reveals no S3, no S4, no distant heart sounds, no friction rub, no midsystolic click and no opening snap.    No murmur heard.  Pulses:       Radial pulses are 2+ on the right side, and 2+ on the left side.   Pulmonary/Chest: Effort normal and breath sounds normal. He has no wheezes. He has no rales.   Abdominal: Soft. Bowel sounds are normal. He exhibits no distension, no abdominal bruit, no ascites and no mass. There is no tenderness.   Musculoskeletal: He exhibits no edema.   Neurological: He is alert and oriented to person, place, and time.   Skin: Skin is warm.   Psychiatric: He has a normal mood and affect. His behavior is normal.   Nursing note and vitals reviewed.      I have reviewed all pertinent labs and cardiac studies.      Assessment and Plan:     Active Diagnoses:    Diagnosis Date Noted POA    Abnormal nuclear stress test [R94.39] 06/21/2018 Yes      Problems Resolved During this Admission:    Diagnosis Date Noted Date Resolved POA       VTE Risk Mitigation     None      Long discussion with pt in clinic on his cardiomyopathy issues and + stress MPI last year.  Indications for LHC discussed, risks/benefits discussed.  Pt would benefit from cath to rule out significant underlying CAD as etiology of his CHF and + stress MPI.  Some conflicting data on tests, EF 35 - 40%, improved on studies last year.  ? Etiology of CHF.  Pt agreeable to proceeding.  Understands well.   At this point in time, he would seem to have silent ischemia although is DENNISON may be anginal equivalent although this does not seem to probably be the case.  He needs to cut back on his Coke, likely contributes to tremor.  Add Rosuvastatin 10 mg nightly for lipids.  Indications, side effects discussed.  Recheck lipids 2 months.  Continue other meds.        Pt advised to undergo left heart catheterization with possible PCI if warranted.  Pt advised of all risks and benefits of procedure.  Pt advised of alternative  approaches and treatment strategies to include medical therapy, PCI as well as surgical revascularization with possible CABG.  All questions answered.          Right radial approach.          Leon James MD  Cardiology   [facility] -

## 2018-06-21 NOTE — OP NOTE
[facility] -   Cardiac Catheterization  Procedure/Discharge Note    SUMMARY     Ezio DOBBS Jacob  [id number]  Darren Nova MD    Date of Procedure: 6/21/2018    Procedure:  1.  LHC  2. LEFT VENTRICULOGRAM  3. CORONARY ANGIOGRAM     Provider: Leon James MD    Assisting Provider:  Bobby Loera    Indications: He was referred for cardiac catheterization to further evaluate cardiomyopathy and abnormal stress MPI.    Pre-Procedure Diagnosis:  Cardiomyopathy, + stress MPI    Post-Procedure Diagnosis: Widely patent coronary arteries    Anesthesia: RN IV Sedation    Description of the Findings of the Procedure:     The risks, benefits, complications, treatment options, and expected outcomes were discussed with the patient. The patient and/or family concurred with the proposed plan, giving informed consent. Patient was brought to the cath lab after IV hydration was begun and oral premedication was given.    Findings:    Widely patent coronary arteries, only luminal irregularities noted  EF 50 - 55%  Right radial TR band  See report    Complications: None; patient tolerated the procedure well.    Estimated Blood Loss (EBL): Minimal           Implants: none     Specimens: none    Condition: stable    Disposition: PACU - hemodynamically stable.    Attestation: I was present and scrubbed for the entire procedure.     Recommendations:    Usual post cath care  Risk factor modification  Reassurance  Cardiac diet  Continue current meds  D/c pt home today  F/u clinic 1 week

## 2018-06-21 NOTE — PLAN OF CARE
0900 Pt tolerated meal well. No c/o nausea or vomiting. Amb to restroom. No dizziness or light headness noted. Peripheral IV removed. D/C insturctions and f/u appointment reviewed with pt and ex wife.   0910 D/C to home via w/c.   MARGARET Talley RN, BSN.

## 2018-06-28 ENCOUNTER — OFFICE VISIT (OUTPATIENT)
Dept: CARDIOLOGY | Facility: CLINIC | Age: 68
End: 2018-06-28
Payer: MEDICARE

## 2018-06-28 VITALS
HEART RATE: 73 BPM | DIASTOLIC BLOOD PRESSURE: 80 MMHG | HEIGHT: 69 IN | WEIGHT: 207 LBS | BODY MASS INDEX: 30.66 KG/M2 | SYSTOLIC BLOOD PRESSURE: 120 MMHG

## 2018-06-28 DIAGNOSIS — I51.7 LVH (LEFT VENTRICULAR HYPERTROPHY): ICD-10-CM

## 2018-06-28 DIAGNOSIS — I50.22 CHRONIC SYSTOLIC CONGESTIVE HEART FAILURE: Primary | ICD-10-CM

## 2018-06-28 DIAGNOSIS — I48.20 ATRIAL FIBRILLATION, CHRONIC: ICD-10-CM

## 2018-06-28 DIAGNOSIS — I10 ESSENTIAL HYPERTENSION: ICD-10-CM

## 2018-06-28 DIAGNOSIS — E78.5 DYSLIPIDEMIA: ICD-10-CM

## 2018-06-28 PROCEDURE — 99999 PR PBB SHADOW E&M-EST. PATIENT-LVL III: CPT | Mod: PBBFAC,,, | Performed by: NURSE PRACTITIONER

## 2018-06-28 PROCEDURE — 3074F SYST BP LT 130 MM HG: CPT | Mod: S$GLB,,, | Performed by: NURSE PRACTITIONER

## 2018-06-28 PROCEDURE — 3079F DIAST BP 80-89 MM HG: CPT | Mod: S$GLB,,, | Performed by: NURSE PRACTITIONER

## 2018-06-28 PROCEDURE — 99213 OFFICE O/P EST LOW 20 MIN: CPT | Mod: S$GLB,,, | Performed by: NURSE PRACTITIONER

## 2018-06-28 NOTE — PROGRESS NOTES
Subjective:   Patient ID:  Ezio James is a 67 y.o. male who presents for follow up of Follow-up      HPI  Patient presents to clinic for follow up after undergoing angiogram to further evaluate coronaries. Patient noted to have abnormal stress test prior to cath. Angiogram showed widely patent coronary arteries, only luminal irregularities and LVEF 50 - 55%. Presents today with no complaints. Has no right radial access complaints. He has PMH of HTN, low HDL, cardiomyopathy, permanent a fib. Patient declined anticoagulation multiple times in past (coumadin and NOAC discussed), but wanted to stay on ASA. He also declined [facility] 2017 for abnl stress MPI 6/17 mild inferoapical ischemia.  His echo and stress MPI 6/17 showed EF to be normal. In 2014 had Echo that showed 40 - 45%, stress MPI 2014 35%. States that he feels good today. Has no CNS complaints to suggest TIA or CVA.  Denies any chest pain, SOB, palpitations, dizziness, near syncope or syncope. Has occasional LE edema. Can use improvement in sodium intake.     Past Medical History:   Diagnosis Date    Atrial fibrillation, chronic     Chronic systolic congestive heart failure 5/4/2018    Colon polyp 04,15    Essential hypertension 6/14/2017    Subclinical hypothyroidism        Past Surgical History:   Procedure Laterality Date    COLONOSCOPY  2004,5/15    with polyps per Chart    LEFT HEART CATHETERIZATION Left 6/21/2018    Procedure: HEART CATH-LEFT;  Surgeon: Leon James MD;  Location: [facility] CATH LAB;  Service: Cardiology;  Laterality: Left;  7:30 start time  right radial approach       Social History   Substance Use Topics    Smoking status: Never Smoker    Smokeless tobacco: Never Used    Alcohol use Yes      Comment: Rarely       Family History   Problem Relation Age of Onset    Lung cancer Mother     Hypertension Mother     Obesity Mother     Cataracts Mother     Emphysema Father     Heart disease Father        Current Outpatient  Prescriptions   Medication Sig    aspirin (ECOTRIN) 81 MG EC tablet Take 1 tablet (81 mg total) by mouth once daily.    cyanocobalamin (VITAMIN B-12) 1000 MCG tablet Take 100 mcg by mouth once daily.    levothyroxine (SYNTHROID) 75 MCG tablet TAKE 1 TABLET DAILY    lisinopril 10 MG tablet Take 1 tablet (10 mg total) by mouth once daily.    metoprolol succinate (TOPROL-XL) 50 MG 24 hr tablet TAKE 1 TABLET DAILY    multivitamin capsule Take 1 capsule by mouth once daily.    rosuvastatin (CRESTOR) 10 MG tablet Take 1 tablet (10 mg total) by mouth every evening.     No current facility-administered medications for this visit.      Current Outpatient Prescriptions on File Prior to Visit   Medication Sig    aspirin (ECOTRIN) 81 MG EC tablet Take 1 tablet (81 mg total) by mouth once daily.    cyanocobalamin (VITAMIN B-12) 1000 MCG tablet Take 100 mcg by mouth once daily.    levothyroxine (SYNTHROID) 75 MCG tablet TAKE 1 TABLET DAILY    lisinopril 10 MG tablet Take 1 tablet (10 mg total) by mouth once daily.    metoprolol succinate (TOPROL-XL) 50 MG 24 hr tablet TAKE 1 TABLET DAILY    multivitamin capsule Take 1 capsule by mouth once daily.    rosuvastatin (CRESTOR) 10 MG tablet Take 1 tablet (10 mg total) by mouth every evening.     No current facility-administered medications on file prior to visit.        Review of Systems   Constitution: Negative for diaphoresis, weakness, malaise/fatigue, weight gain and weight loss.   HENT: Negative for congestion and nosebleeds.    Cardiovascular: Negative for chest pain, claudication, cyanosis, dyspnea on exertion, irregular heartbeat, leg swelling, near-syncope, orthopnea, palpitations, paroxysmal nocturnal dyspnea and syncope.   Respiratory: Negative for cough, hemoptysis, shortness of breath, sleep disturbances due to breathing, snoring, sputum production and wheezing.    Hematologic/Lymphatic: Negative for bleeding problem. Does not bruise/bleed easily.   Skin:  "Negative for rash.   Musculoskeletal: Negative for arthritis, back pain, falls, joint pain, muscle cramps and muscle weakness.   Gastrointestinal: Negative for abdominal pain, constipation, diarrhea, heartburn, hematemesis, hematochezia, melena and nausea.   Genitourinary: Negative for dysuria, hematuria and nocturia.   Neurological: Negative for excessive daytime sleepiness, dizziness, headaches, light-headedness, loss of balance, numbness and vertigo.       Objective:   Physical Exam   Constitutional: He is oriented to person, place, and time. He appears well-developed and well-nourished.   Neck: Neck supple. No JVD present.   Cardiovascular: Normal rate, regular rhythm, normal heart sounds and normal pulses.  Exam reveals no friction rub.    No murmur heard.  Pulmonary/Chest: Effort normal and breath sounds normal. No respiratory distress. He has no wheezes. He has no rales.   Abdominal: Soft. Bowel sounds are normal. He exhibits no distension.   Musculoskeletal: He exhibits no edema or tenderness.   Neurological: He is alert and oriented to person, place, and time.   Skin: Skin is warm and dry. No rash noted.   Right radial access site without redness, tenderness, swelling, or drainage. There is no active external bleeding or hematoma.      Psychiatric: He has a normal mood and affect. His behavior is normal.   Nursing note and vitals reviewed.    Vitals:    06/28/18 1639   BP: 120/80   Pulse: 73   Weight: 93.9 kg (207 lb 0.2 oz)   Height: 5' 9" (1.753 m)     Lab Results   Component Value Date    CHOL 96 (L) 06/14/2018    CHOL 187 04/24/2018    CHOL 154 12/30/2016     Lab Results   Component Value Date    HDL 35 (L) 06/14/2018    HDL 43 04/24/2018    HDL 36 (L) 12/30/2016     Lab Results   Component Value Date    LDLCALC 43.6 (L) 06/14/2018    LDLCALC 102.4 04/24/2018    LDLCALC 98.8 12/30/2016     Lab Results   Component Value Date    TRIG 87 06/14/2018    TRIG 208 (H) 04/24/2018    TRIG 96 12/30/2016     Lab " Results   Component Value Date    CHOLHDL 36.5 06/14/2018    CHOLHDL 23.0 04/24/2018    CHOLHDL 23.4 12/30/2016       Chemistry        Component Value Date/Time     06/14/2018 0832     06/14/2018 0832    K 4.7 06/14/2018 0832    K 4.7 06/14/2018 0832     06/14/2018 0832     06/14/2018 0832    CO2 30 (H) 06/14/2018 0832    CO2 30 (H) 06/14/2018 0832    BUN 17 06/14/2018 0832    BUN 17 06/14/2018 0832    CREATININE 0.9 06/14/2018 0832    CREATININE 0.9 06/14/2018 0832    GLU 93 06/14/2018 0832    GLU 93 06/14/2018 0832        Component Value Date/Time    CALCIUM 9.5 06/14/2018 0832    CALCIUM 9.5 06/14/2018 0832    ALKPHOS 59 06/14/2018 0832    AST 23 06/14/2018 0832    ALT 15 06/14/2018 0832    BILITOT 1.5 (H) 06/14/2018 0832    ESTGFRAFRICA >60.0 06/14/2018 0832    ESTGFRAFRICA >60.0 06/14/2018 0832    EGFRNONAA >60.0 06/14/2018 0832    EGFRNONAA >60.0 06/14/2018 0832          Lab Results   Component Value Date    TSH 6.044 (H) 04/24/2018     Lab Results   Component Value Date    INR 1.0 06/14/2018     Lab Results   Component Value Date    WBC 8.55 06/14/2018    HGB 14.5 06/14/2018    HCT 44.8 06/14/2018    MCV 99 (H) 06/14/2018     06/14/2018     BMP  Sodium   Date Value Ref Range Status   06/14/2018 140 136 - 145 mmol/L Final   06/14/2018 140 136 - 145 mmol/L Final     Potassium   Date Value Ref Range Status   06/14/2018 4.7 3.5 - 5.1 mmol/L Final   06/14/2018 4.7 3.5 - 5.1 mmol/L Final     Chloride   Date Value Ref Range Status   06/14/2018 105 95 - 110 mmol/L Final   06/14/2018 105 95 - 110 mmol/L Final     CO2   Date Value Ref Range Status   06/14/2018 30 (H) 23 - 29 mmol/L Final   06/14/2018 30 (H) 23 - 29 mmol/L Final     BUN, Bld   Date Value Ref Range Status   06/14/2018 17 8 - 23 mg/dL Final   06/14/2018 17 8 - 23 mg/dL Final     Creatinine   Date Value Ref Range Status   06/14/2018 0.9 0.5 - 1.4 mg/dL Final   06/14/2018 0.9 0.5 - 1.4 mg/dL Final     Calcium   Date Value Ref  Range Status   06/14/2018 9.5 8.7 - 10.5 mg/dL Final   06/14/2018 9.5 8.7 - 10.5 mg/dL Final     Anion Gap   Date Value Ref Range Status   06/14/2018 5 (L) 8 - 16 mmol/L Final   06/14/2018 5 (L) 8 - 16 mmol/L Final     eGFR if    Date Value Ref Range Status   06/14/2018 >60.0 >60 mL/min/1.73 m^2 Final   06/14/2018 >60.0 >60 mL/min/1.73 m^2 Final     eGFR if non    Date Value Ref Range Status   06/14/2018 >60.0 >60 mL/min/1.73 m^2 Final     Comment:     Calculation used to obtain the estimated glomerular filtration  rate (eGFR) is the CKD-EPI equation.      06/14/2018 >60.0 >60 mL/min/1.73 m^2 Final     Comment:     Calculation used to obtain the estimated glomerular filtration  rate (eGFR) is the CKD-EPI equation.        CrCl cannot be calculated (Patient's most recent lab result is older than the maximum 7 days allowed.).    CONCLUSIONS     1 - Moderate left atrial enlargement.     2 - Concentric hypertrophy.     3 - No wall motion abnormalities.     4 - Normal left ventricular systolic function (EF 60-65%).     5 - Indeterminate LV diastolic function.     6 - Normal right ventricular systolic function .     7 - The estimated PA systolic pressure is greater than 36 mmHg.     8 - Mild mitral regurgitation.             This document has been electronically    SIGNED BY: Herbert Greene MD On: 07/01/2017 06:09      Diagnostic:          Patient has a right dominant coronary artery.        - Left Main Coronary Artery:             The LM is normal. There is JUAN DAVID 3 flow.     - Left Anterior Descending Artery:             The LAD has luminal irregularities. There is JUAN DAVID 3 flow.     - D1:             The D1 is normal. There is JUAN DAVID 3 flow.     - D2:             The D2 is normal. There is JUAN DAVID 3 flow.     - Left Circumflex Artery:             The LCX is normal. There is JUAN DAVID 3 flow.     - OM1:             The OM1 is normal. There is JUAN DAVID 3 flow.     - Right Coronary Artery:             The  RCA has luminal irregularities. There is JUAN DAVID 3 flow.    Assessment:     1. Chronic systolic congestive heart failure    2. Atrial fibrillation, chronic    3. Dyslipidemia    4. Essential hypertension    5. LVH (left ventricular hypertrophy)      Had declined OAC in the past. Taking ASA 81mg daily for CVA prophylaxis  No evidence of decompensated HF  BP stable.   Minimal CAD on recent cath  No CNS complaints to suggest TIA or CVA  LVEF 50 - 55% on cath    Plan:     Continue current medical management  Still declines OAC. Continue ASA 81mg daily    Heart healthy diet   Heart healthy diet  Limit fluid intake 50-60 oz   Daily weights and to notify clinic if weight increases by more than 3 lbs in 1 day or 5 lbs in 1 week.   Exercise routine as tolerated  RTC in 6 months or sooner if needed

## 2018-10-24 ENCOUNTER — OFFICE VISIT (OUTPATIENT)
Dept: INTERNAL MEDICINE | Facility: CLINIC | Age: 68
End: 2018-10-24
Payer: MEDICARE

## 2018-10-24 VITALS
OXYGEN SATURATION: 96 % | BODY MASS INDEX: 30.63 KG/M2 | TEMPERATURE: 98 F | WEIGHT: 206.81 LBS | SYSTOLIC BLOOD PRESSURE: 100 MMHG | HEIGHT: 69 IN | HEART RATE: 84 BPM | DIASTOLIC BLOOD PRESSURE: 77 MMHG

## 2018-10-24 DIAGNOSIS — I48.20 ATRIAL FIBRILLATION, CHRONIC: ICD-10-CM

## 2018-10-24 DIAGNOSIS — I10 ESSENTIAL HYPERTENSION: ICD-10-CM

## 2018-10-24 DIAGNOSIS — M25.50 ARTHRALGIA, UNSPECIFIED JOINT: ICD-10-CM

## 2018-10-24 DIAGNOSIS — R60.9 EDEMA, UNSPECIFIED TYPE: ICD-10-CM

## 2018-10-24 DIAGNOSIS — E03.4 HYPOTHYROIDISM DUE TO ACQUIRED ATROPHY OF THYROID: Primary | ICD-10-CM

## 2018-10-24 LAB
T4 FREE SERPL-MCNC: 1.14 NG/DL
TSH SERPL DL<=0.005 MIU/L-ACNC: 1.87 UIU/ML

## 2018-10-24 PROCEDURE — 99214 OFFICE O/P EST MOD 30 MIN: CPT | Mod: S$PBB,,, | Performed by: FAMILY MEDICINE

## 2018-10-24 PROCEDURE — 3074F SYST BP LT 130 MM HG: CPT | Mod: ,,, | Performed by: FAMILY MEDICINE

## 2018-10-24 PROCEDURE — 84443 ASSAY THYROID STIM HORMONE: CPT

## 2018-10-24 PROCEDURE — 99213 OFFICE O/P EST LOW 20 MIN: CPT | Mod: PBBFAC,PO | Performed by: FAMILY MEDICINE

## 2018-10-24 PROCEDURE — 3078F DIAST BP <80 MM HG: CPT | Mod: ,,, | Performed by: FAMILY MEDICINE

## 2018-10-24 PROCEDURE — 1101F PT FALLS ASSESS-DOCD LE1/YR: CPT | Mod: ,,, | Performed by: FAMILY MEDICINE

## 2018-10-24 PROCEDURE — 84439 ASSAY OF FREE THYROXINE: CPT

## 2018-10-24 PROCEDURE — 90662 IIV NO PRSV INCREASED AG IM: CPT | Mod: PBBFAC,PO

## 2018-10-24 PROCEDURE — 99999 PR PBB SHADOW E&M-EST. PATIENT-LVL III: CPT | Mod: PBBFAC,,, | Performed by: FAMILY MEDICINE

## 2018-10-24 NOTE — PROGRESS NOTES
Subjective:       Patient ID: Ezio James is a 67 y.o. male.    Chief Complaint: Follow-up    Follow-up hypertension atrial fibrillation hypothyroidism.  He is also followed by Cardiology.  He denies headache chest pain palpitations shortness of breath.  He reports occasional swelling of his ankles when sitting in the car  4 hr.  He reports some recent onset of fatigue.  He also has noticed that his mentation is not quite as good as it used to be.      Review of Systems   Constitutional: Positive for fatigue. Negative for activity change, appetite change and unexpected weight change.   Respiratory: Negative for cough, chest tightness, shortness of breath and wheezing.    Cardiovascular: Negative for chest pain, palpitations and leg swelling.        Occasional ankle edema   Gastrointestinal: Negative for abdominal pain.   Genitourinary: Negative for difficulty urinating.   Musculoskeletal: Positive for arthralgias.        Mild joint discomfort after prolonged sitting   Neurological: Negative for headaches.       Objective:      Physical Exam   Constitutional: He is oriented to person, place, and time. He appears well-developed and well-nourished. No distress.   Neck: Neck supple. No tracheal deviation present. No thyromegaly present.   Cardiovascular: Normal rate and normal heart sounds.   No murmur heard.  Controlled rate atrial fibrillation   Pulmonary/Chest: Effort normal and breath sounds normal. No respiratory distress. He has no wheezes.   Abdominal: Soft. Bowel sounds are normal. He exhibits no mass. There is no tenderness.   Lymphadenopathy:     He has no cervical adenopathy.   Neurological: He is alert and oriented to person, place, and time.       Lab Visit on 06/14/2018   Component Date Value Ref Range Status    Sodium 06/14/2018 140  136 - 145 mmol/L Final    Potassium 06/14/2018 4.7  3.5 - 5.1 mmol/L Final    Chloride 06/14/2018 105  95 - 110 mmol/L Final    CO2 06/14/2018 30* 23 - 29 mmol/L Final     Glucose 06/14/2018 93  70 - 110 mg/dL Final    BUN, Bld 06/14/2018 17  8 - 23 mg/dL Final    Creatinine 06/14/2018 0.9  0.5 - 1.4 mg/dL Final    Calcium 06/14/2018 9.5  8.7 - 10.5 mg/dL Final    Total Protein 06/14/2018 7.4  6.0 - 8.4 g/dL Final    Albumin 06/14/2018 4.0  3.5 - 5.2 g/dL Final    Total Bilirubin 06/14/2018 1.5* 0.1 - 1.0 mg/dL Final    Alkaline Phosphatase 06/14/2018 59  55 - 135 U/L Final    AST 06/14/2018 23  10 - 40 U/L Final    ALT 06/14/2018 15  10 - 44 U/L Final    Anion Gap 06/14/2018 5* 8 - 16 mmol/L Final    eGFR if African American 06/14/2018 >60.0  >60 mL/min/1.73 m^2 Final    eGFR if non African American 06/14/2018 >60.0  >60 mL/min/1.73 m^2 Final    Cholesterol 06/14/2018 96* 120 - 199 mg/dL Final    Triglycerides 06/14/2018 87  30 - 150 mg/dL Final    HDL 06/14/2018 35* 40 - 75 mg/dL Final    LDL Cholesterol 06/14/2018 43.6* 63.0 - 159.0 mg/dL Final    HDL/Chol Ratio 06/14/2018 36.5  20.0 - 50.0 % Final    Total Cholesterol/HDL Ratio 06/14/2018 2.7  2.0 - 5.0 Final    Non-HDL Cholesterol 06/14/2018 61  mg/dL Final    WBC 06/14/2018 8.55  3.90 - 12.70 K/uL Final    RBC 06/14/2018 4.54* 4.60 - 6.20 M/uL Final    Hemoglobin 06/14/2018 14.5  14.0 - 18.0 g/dL Final    Hematocrit 06/14/2018 44.8  40.0 - 54.0 % Final    MCV 06/14/2018 99* 82 - 98 fL Final    MCH 06/14/2018 31.9* 27.0 - 31.0 pg Final    MCHC 06/14/2018 32.4  32.0 - 36.0 g/dL Final    RDW 06/14/2018 12.6  11.5 - 14.5 % Final    Platelets 06/14/2018 273  150 - 350 K/uL Final    MPV 06/14/2018 10.9  9.2 - 12.9 fL Final    Immature Granulocytes 06/14/2018 0.4  0.0 - 0.5 % Final    Gran # (ANC) 06/14/2018 5.8  1.8 - 7.7 K/uL Final    Immature Grans (Abs) 06/14/2018 0.03  0.00 - 0.04 K/uL Final    Lymph # 06/14/2018 2.0  1.0 - 4.8 K/uL Final    Mono # 06/14/2018 0.6  0.3 - 1.0 K/uL Final    Eos # 06/14/2018 0.2  0.0 - 0.5 K/uL Final    Baso # 06/14/2018 0.05  0.00 - 0.20 K/uL Final    nRBC  06/14/2018 0  0 /100 WBC Final    Gran% 06/14/2018 67.2  38.0 - 73.0 % Final    Lymph% 06/14/2018 22.9  18.0 - 48.0 % Final    Mono% 06/14/2018 6.4  4.0 - 15.0 % Final    Eosinophil% 06/14/2018 2.5  0.0 - 8.0 % Final    Basophil% 06/14/2018 0.6  0.0 - 1.9 % Final    Differential Method 06/14/2018 Automated   Final    aPTT 06/14/2018 22.7  21.0 - 32.0 sec Final    Sodium 06/14/2018 140  136 - 145 mmol/L Final    Potassium 06/14/2018 4.7  3.5 - 5.1 mmol/L Final    Chloride 06/14/2018 105  95 - 110 mmol/L Final    CO2 06/14/2018 30* 23 - 29 mmol/L Final    Glucose 06/14/2018 93  70 - 110 mg/dL Final    BUN, Bld 06/14/2018 17  8 - 23 mg/dL Final    Creatinine 06/14/2018 0.9  0.5 - 1.4 mg/dL Final    Calcium 06/14/2018 9.5  8.7 - 10.5 mg/dL Final    Anion Gap 06/14/2018 5* 8 - 16 mmol/L Final    eGFR if African American 06/14/2018 >60.0  >60 mL/min/1.73 m^2 Final    eGFR if non African American 06/14/2018 >60.0  >60 mL/min/1.73 m^2 Final    Prothrombin Time 06/14/2018 10.1  9.0 - 12.5 sec Final    INR 06/14/2018 1.0  0.8 - 1.2 Final     Assessment:       1. Hypothyroidism due to acquired atrophy of thyroid        Plan:   Recheck thyroid functions.  Possibly increase dose of thyroid medication   flu vaccination given today.  Recommend shingles vaccination pharmacy.  Follow-up in 6 months      Hypothyroidism due to acquired atrophy of thyroid  -     TSH  -     T4, free    Other orders  -     Influenza - High Dose (65+) (PF) (IM)

## 2018-10-26 ENCOUNTER — TELEPHONE (OUTPATIENT)
Dept: INTERNAL MEDICINE | Facility: CLINIC | Age: 68
End: 2018-10-26

## 2018-10-26 NOTE — TELEPHONE ENCOUNTER
----- Message from Ivania James sent at 10/26/2018  2:07 PM CDT -----  Contact: pt  Calling in regards to results and please advise. [phone] (home)

## 2018-11-23 ENCOUNTER — PES CALL (OUTPATIENT)
Dept: ADMINISTRATIVE | Facility: CLINIC | Age: 68
End: 2018-11-23

## 2018-12-05 ENCOUNTER — PES CALL (OUTPATIENT)
Dept: ADMINISTRATIVE | Facility: CLINIC | Age: 68
End: 2018-12-05

## 2019-01-10 ENCOUNTER — OFFICE VISIT (OUTPATIENT)
Dept: CARDIOLOGY | Facility: CLINIC | Age: 69
End: 2019-01-10
Payer: MEDICARE

## 2019-01-10 VITALS
DIASTOLIC BLOOD PRESSURE: 70 MMHG | HEIGHT: 69 IN | SYSTOLIC BLOOD PRESSURE: 112 MMHG | BODY MASS INDEX: 30.17 KG/M2 | WEIGHT: 203.69 LBS | HEART RATE: 76 BPM

## 2019-01-10 DIAGNOSIS — I10 ESSENTIAL HYPERTENSION: ICD-10-CM

## 2019-01-10 DIAGNOSIS — E78.5 DYSLIPIDEMIA: ICD-10-CM

## 2019-01-10 DIAGNOSIS — I42.9 CARDIOMYOPATHY, UNSPECIFIED TYPE: ICD-10-CM

## 2019-01-10 DIAGNOSIS — I51.7 LVH (LEFT VENTRICULAR HYPERTROPHY): ICD-10-CM

## 2019-01-10 DIAGNOSIS — E78.6 LOW HDL (UNDER 40): ICD-10-CM

## 2019-01-10 DIAGNOSIS — I48.20 ATRIAL FIBRILLATION, CHRONIC: ICD-10-CM

## 2019-01-10 DIAGNOSIS — I50.22 CHRONIC SYSTOLIC CONGESTIVE HEART FAILURE: Primary | ICD-10-CM

## 2019-01-10 PROBLEM — R94.39 ABNORMAL NUCLEAR STRESS TEST: Status: RESOLVED | Noted: 2018-06-21 | Resolved: 2019-01-10

## 2019-01-10 PROCEDURE — 3074F PR MOST RECENT SYSTOLIC BLOOD PRESSURE < 130 MM HG: ICD-10-PCS | Mod: S$GLB,,, | Performed by: INTERNAL MEDICINE

## 2019-01-10 PROCEDURE — 99214 PR OFFICE/OUTPT VISIT, EST, LEVL IV, 30-39 MIN: ICD-10-PCS | Mod: S$GLB,,, | Performed by: INTERNAL MEDICINE

## 2019-01-10 PROCEDURE — 3078F DIAST BP <80 MM HG: CPT | Mod: S$GLB,,, | Performed by: INTERNAL MEDICINE

## 2019-01-10 PROCEDURE — 1101F PR PT FALLS ASSESS DOC 0-1 FALLS W/OUT INJ PAST YR: ICD-10-PCS | Mod: S$GLB,,, | Performed by: INTERNAL MEDICINE

## 2019-01-10 PROCEDURE — 3078F PR MOST RECENT DIASTOLIC BLOOD PRESSURE < 80 MM HG: ICD-10-PCS | Mod: S$GLB,,, | Performed by: INTERNAL MEDICINE

## 2019-01-10 PROCEDURE — 99999 PR PBB SHADOW E&M-EST. PATIENT-LVL III: CPT | Mod: PBBFAC,,, | Performed by: INTERNAL MEDICINE

## 2019-01-10 PROCEDURE — 99214 OFFICE O/P EST MOD 30 MIN: CPT | Mod: S$GLB,,, | Performed by: INTERNAL MEDICINE

## 2019-01-10 PROCEDURE — 1101F PT FALLS ASSESS-DOCD LE1/YR: CPT | Mod: S$GLB,,, | Performed by: INTERNAL MEDICINE

## 2019-01-10 PROCEDURE — 99999 PR PBB SHADOW E&M-EST. PATIENT-LVL III: ICD-10-PCS | Mod: PBBFAC,,, | Performed by: INTERNAL MEDICINE

## 2019-01-10 PROCEDURE — 3074F SYST BP LT 130 MM HG: CPT | Mod: S$GLB,,, | Performed by: INTERNAL MEDICINE

## 2019-01-10 NOTE — PROGRESS NOTES
Subjective:    Patient ID:  Ezio James is a 68 y.o. male who presents for evaluation of Congestive Heart Failure; Coronary Artery Disease; Hyperlipidemia; Hypertension; and Risk Factor Management For Atherosclerosis          HPI Pt presents for f/u.  He has HTN, hyperlipidemia, CAD, CHF, LVH, cardiomyopathy, permanent a fib.   Past hx pertinent for following:  He declined anticoagulation multiple times in past (coumadin and NOAC discussed), wanted to stay on asa.   2014 echo 40 - 45%, stress MPI 2014 EF 35%.  Here for f/u.  S/p [facility] 6/18 for abnl stress test and to more definitively assess LVEF.  Cath showed widely patent coronary arteries (only luminal irregularities) and LVEF 50 - 55%.  No chest pain sxs.  Has chronic DENNISON with real strenuous activity, unchanged. No pnd/orthopnea.  HTN well controlled on current meds.  Lipids have improved a lot with initiation of Rosuvastatin.  No TIA/CVA sxs.  On asa qd.  Mindful of diet.  Some exercise.  Weight down some since last visit      Current Outpatient Medications:     aspirin (ECOTRIN) 81 MG EC tablet, Take 1 tablet (81 mg total) by mouth once daily., Disp: 90 tablet, Rfl: 2    cyanocobalamin (VITAMIN B-12) 1000 MCG tablet, Take 100 mcg by mouth once daily., Disp: , Rfl:     levothyroxine (SYNTHROID) 75 MCG tablet, TAKE 1 TABLET DAILY, Disp: 90 tablet, Rfl: 3    lisinopril 10 MG tablet, Take 1 tablet (10 mg total) by mouth once daily., Disp: 90 tablet, Rfl: 3    metoprolol succinate (TOPROL-XL) 50 MG 24 hr tablet, TAKE 1 TABLET DAILY, Disp: 90 tablet, Rfl: 3    multivitamin capsule, Take 1 capsule by mouth once daily., Disp: , Rfl:     rosuvastatin (CRESTOR) 10 MG tablet, Take 1 tablet (10 mg total) by mouth every evening., Disp: 90 tablet, Rfl: 3    Review of Systems   Constitution: Negative.   HENT: Negative.    Eyes: Negative.    Cardiovascular: Positive for dyspnea on exertion.   Respiratory: Positive for shortness of breath.    Endocrine: Negative.   "  Hematologic/Lymphatic: Negative.    Skin: Negative.    Musculoskeletal: Negative.    Gastrointestinal: Negative.    Genitourinary: Negative.    Neurological: Negative.    Psychiatric/Behavioral: Negative.    Allergic/Immunologic: Negative.        /70 (BP Location: Right arm, Patient Position: Sitting, BP Method: Large (Manual))   Pulse 76 Comment: Irregular  Ht 5' 9" (1.753 m)   Wt 92.4 kg (203 lb 11.3 oz)   BMI 30.08 kg/m²     Wt Readings from Last 3 Encounters:   01/10/19 92.4 kg (203 lb 11.3 oz)   10/24/18 93.8 kg (206 lb 12.7 oz)   06/28/18 93.9 kg (207 lb 0.2 oz)     Temp Readings from Last 3 Encounters:   10/24/18 97.8 °F (36.6 °C)   06/21/18 97.6 °F (36.4 °C) (Oral)   04/24/18 97.1 °F (36.2 °C) (Oral)     BP Readings from Last 3 Encounters:   01/10/19 112/70   10/24/18 100/77   06/28/18 120/80     Pulse Readings from Last 3 Encounters:   01/10/19 76   10/24/18 84   06/28/18 73          Objective:    Physical Exam   Constitutional: He is oriented to person, place, and time. He appears well-developed and well-nourished.   HENT:   Head: Normocephalic.   Neck: Normal range of motion. Neck supple. Normal carotid pulses, no hepatojugular reflux and no JVD present. Carotid bruit is not present. No thyromegaly present.   Cardiovascular: Normal rate, regular rhythm, S1 normal and S2 normal. PMI is not displaced. Exam reveals no S3, no S4, no distant heart sounds, no friction rub, no midsystolic click and no opening snap.   No murmur heard.  Pulses:       Radial pulses are 2+ on the right side, and 2+ on the left side.   Pulmonary/Chest: Effort normal and breath sounds normal. He has no wheezes. He has no rales.   Abdominal: Soft. Bowel sounds are normal. He exhibits no distension, no abdominal bruit, no ascites and no mass. There is no tenderness.   Musculoskeletal: He exhibits no edema.   Neurological: He is alert and oriented to person, place, and time.   Skin: Skin is warm.   Psychiatric: He has a " normal mood and affect. His behavior is normal.   Nursing note and vitals reviewed.      I have reviewed all pertinent labs and cardiac studies.      Chemistry        Component Value Date/Time     06/14/2018 0832     06/14/2018 0832    K 4.7 06/14/2018 0832    K 4.7 06/14/2018 0832     06/14/2018 0832     06/14/2018 0832    CO2 30 (H) 06/14/2018 0832    CO2 30 (H) 06/14/2018 0832    BUN 17 06/14/2018 0832    BUN 17 06/14/2018 0832    CREATININE 0.9 06/14/2018 0832    CREATININE 0.9 06/14/2018 0832    GLU 93 06/14/2018 0832    GLU 93 06/14/2018 0832        Component Value Date/Time    CALCIUM 9.5 06/14/2018 0832    CALCIUM 9.5 06/14/2018 0832    ALKPHOS 59 06/14/2018 0832    AST 23 06/14/2018 0832    ALT 15 06/14/2018 0832    BILITOT 1.5 (H) 06/14/2018 0832    ESTGFRAFRICA >60.0 06/14/2018 0832    ESTGFRAFRICA >60.0 06/14/2018 0832    EGFRNONAA >60.0 06/14/2018 0832    EGFRNONAA >60.0 06/14/2018 0832        Lab Results   Component Value Date    WBC 8.55 06/14/2018    HGB 14.5 06/14/2018    HCT 44.8 06/14/2018    MCV 99 (H) 06/14/2018     06/14/2018     No results found for: LABA1C, HGBA1C  Lab Results   Component Value Date    CHOL 96 (L) 06/14/2018    CHOL 187 04/24/2018    CHOL 154 12/30/2016     Lab Results   Component Value Date    HDL 35 (L) 06/14/2018    HDL 43 04/24/2018    HDL 36 (L) 12/30/2016     Lab Results   Component Value Date    LDLCALC 43.6 (L) 06/14/2018    LDLCALC 102.4 04/24/2018    LDLCALC 98.8 12/30/2016     Lab Results   Component Value Date    TRIG 87 06/14/2018    TRIG 208 (H) 04/24/2018    TRIG 96 12/30/2016     Lab Results   Component Value Date    CHOLHDL 36.5 06/14/2018    CHOLHDL 23.0 04/24/2018    CHOLHDL 23.4 12/30/2016           Assessment:       1. Chronic systolic congestive heart failure    2. Cardiomyopathy, unspecified type    3. Atrial fibrillation, chronic    4. Essential hypertension    5. Dyslipidemia    6. LVH (left ventricular hypertrophy)     7. Low HDL (under 40)    8. DENNISON (dyspnea on exertion)         Plan:             Stable CV conditions on current medical tx.  Discussed indications for anticoagulation for a fib CVA protection with pt again.  Elevated CHADS-vasc score 2.  Indications, risks/benefits of coumadin and all NOAC discussed.  Pt does not want to escalate tx and prefers to stay on ASA realizing this suboptimal treatment.  Continue current meds.  Cardiac diet  Exercise daily x 30 minutes  Weight loss  F/u 6 months with labs.

## 2019-03-21 DIAGNOSIS — E03.4 HYPOTHYROIDISM DUE TO ACQUIRED ATROPHY OF THYROID: ICD-10-CM

## 2019-03-21 RX ORDER — LEVOTHYROXINE SODIUM 75 UG/1
TABLET ORAL
Qty: 90 TABLET | Refills: 3 | Status: SHIPPED | OUTPATIENT
Start: 2019-03-21 | End: 2020-03-24

## 2019-04-10 ENCOUNTER — PATIENT OUTREACH (OUTPATIENT)
Dept: ADMINISTRATIVE | Facility: HOSPITAL | Age: 69
End: 2019-04-10

## 2019-04-22 DIAGNOSIS — E78.5 DYSLIPIDEMIA: ICD-10-CM

## 2019-04-22 RX ORDER — ROSUVASTATIN CALCIUM 10 MG/1
TABLET, COATED ORAL
Qty: 90 TABLET | Refills: 3 | Status: SHIPPED | OUTPATIENT
Start: 2019-04-22 | End: 2020-04-19

## 2019-04-24 ENCOUNTER — OFFICE VISIT (OUTPATIENT)
Dept: INTERNAL MEDICINE | Facility: CLINIC | Age: 69
End: 2019-04-24
Payer: MEDICARE

## 2019-04-24 VITALS
BODY MASS INDEX: 30.21 KG/M2 | TEMPERATURE: 98 F | OXYGEN SATURATION: 96 % | WEIGHT: 204.56 LBS | DIASTOLIC BLOOD PRESSURE: 78 MMHG | HEART RATE: 87 BPM | SYSTOLIC BLOOD PRESSURE: 110 MMHG

## 2019-04-24 DIAGNOSIS — R25.1 TREMOR: ICD-10-CM

## 2019-04-24 DIAGNOSIS — E03.4 HYPOTHYROIDISM DUE TO ACQUIRED ATROPHY OF THYROID: Primary | ICD-10-CM

## 2019-04-24 DIAGNOSIS — I10 ESSENTIAL HYPERTENSION: ICD-10-CM

## 2019-04-24 LAB
T4 FREE SERPL-MCNC: 0.96 NG/DL (ref 0.71–1.51)
TSH SERPL DL<=0.005 MIU/L-ACNC: 2.12 UIU/ML (ref 0.4–4)

## 2019-04-24 PROCEDURE — 3078F DIAST BP <80 MM HG: CPT | Mod: S$GLB,,, | Performed by: FAMILY MEDICINE

## 2019-04-24 PROCEDURE — 99999 PR PBB SHADOW E&M-EST. PATIENT-LVL III: ICD-10-PCS | Mod: PBBFAC,,, | Performed by: FAMILY MEDICINE

## 2019-04-24 PROCEDURE — 3074F PR MOST RECENT SYSTOLIC BLOOD PRESSURE < 130 MM HG: ICD-10-PCS | Mod: S$GLB,,, | Performed by: FAMILY MEDICINE

## 2019-04-24 PROCEDURE — 99999 PR PBB SHADOW E&M-EST. PATIENT-LVL III: CPT | Mod: PBBFAC,,, | Performed by: FAMILY MEDICINE

## 2019-04-24 PROCEDURE — 3078F PR MOST RECENT DIASTOLIC BLOOD PRESSURE < 80 MM HG: ICD-10-PCS | Mod: S$GLB,,, | Performed by: FAMILY MEDICINE

## 2019-04-24 PROCEDURE — 36415 COLL VENOUS BLD VENIPUNCTURE: CPT | Mod: S$GLB,,, | Performed by: FAMILY MEDICINE

## 2019-04-24 PROCEDURE — 1101F PR PT FALLS ASSESS DOC 0-1 FALLS W/OUT INJ PAST YR: ICD-10-PCS | Mod: S$GLB,,, | Performed by: FAMILY MEDICINE

## 2019-04-24 PROCEDURE — 99213 PR OFFICE/OUTPT VISIT, EST, LEVL III, 20-29 MIN: ICD-10-PCS | Mod: S$GLB,,, | Performed by: FAMILY MEDICINE

## 2019-04-24 PROCEDURE — 84443 ASSAY THYROID STIM HORMONE: CPT

## 2019-04-24 PROCEDURE — 1101F PT FALLS ASSESS-DOCD LE1/YR: CPT | Mod: S$GLB,,, | Performed by: FAMILY MEDICINE

## 2019-04-24 PROCEDURE — 84439 ASSAY OF FREE THYROXINE: CPT

## 2019-04-24 PROCEDURE — 36415 PR COLLECTION VENOUS BLOOD,VENIPUNCTURE: ICD-10-PCS | Mod: S$GLB,,, | Performed by: FAMILY MEDICINE

## 2019-04-24 PROCEDURE — 3074F SYST BP LT 130 MM HG: CPT | Mod: S$GLB,,, | Performed by: FAMILY MEDICINE

## 2019-04-24 PROCEDURE — 99213 OFFICE O/P EST LOW 20 MIN: CPT | Mod: S$GLB,,, | Performed by: FAMILY MEDICINE

## 2019-04-24 NOTE — PROGRESS NOTES
Subjective:       Patient ID: Ezio James is a 68 y.o. male.    Chief Complaint: Follow-up    Follow-up hypertension hypothyroidism.  He has also followed by Cardiology for chronic atrial fibrillation.  He denies headache chest pain palpitations shortness of breath.  He has  tremor.  It affects his signature.    Review of Systems   Constitutional: Negative for appetite change, fatigue and unexpected weight change.   Respiratory: Negative for cough, shortness of breath and wheezing.    Cardiovascular: Negative for chest pain, palpitations and leg swelling.   Gastrointestinal: Negative for abdominal pain.   Genitourinary: Negative for difficulty urinating.   Neurological: Positive for tremors. Negative for headaches.       Objective:      Physical Exam   Constitutional: He is oriented to person, place, and time. He appears well-developed and well-nourished. No distress.   Neck: Neck supple. No thyromegaly present.   Cardiovascular: Normal rate and normal heart sounds.   No murmur heard.  Atrial fibrillation controlled rate of 87   Pulmonary/Chest: Effort normal and breath sounds normal. No respiratory distress. He has no wheezes.   Abdominal: Soft. Bowel sounds are normal. He exhibits no mass. There is no tenderness.   Lymphadenopathy:     He has no cervical adenopathy.   Neurological: He is alert and oriented to person, place, and time.       Office Visit on 10/24/2018   Component Date Value Ref Range Status    TSH 10/24/2018 1.873  0.400 - 4.000 uIU/mL Final    Free T4 10/24/2018 1.14  0.71 - 1.51 ng/dL Final     Assessment:       1. Hypothyroidism due to acquired atrophy of thyroid        Plan:     Blood pressure controlled 110/78.  TSH free T4 ordered.  Levothyroxine prescription up-to-date.  Discussed need for shingles immunization at the pharmacy follow-up in 6 months    Hypothyroidism due to acquired atrophy of thyroid  -     TSH  -     T4, free

## 2019-05-17 DIAGNOSIS — I42.9 CARDIOMYOPATHY, UNSPECIFIED TYPE: ICD-10-CM

## 2019-05-20 RX ORDER — METOPROLOL SUCCINATE 50 MG/1
TABLET, EXTENDED RELEASE ORAL
Qty: 90 TABLET | Refills: 3 | Status: SHIPPED | OUTPATIENT
Start: 2019-05-20 | End: 2019-05-27 | Stop reason: SDUPTHER

## 2019-05-23 DIAGNOSIS — I10 ESSENTIAL HYPERTENSION: ICD-10-CM

## 2019-05-23 DIAGNOSIS — I42.9 CARDIOMYOPATHY, UNSPECIFIED TYPE: ICD-10-CM

## 2019-05-23 RX ORDER — LISINOPRIL 10 MG/1
10 TABLET ORAL DAILY
Qty: 90 TABLET | Refills: 3 | Status: SHIPPED | OUTPATIENT
Start: 2019-05-23 | End: 2019-06-24 | Stop reason: SDUPTHER

## 2019-05-27 RX ORDER — METOPROLOL SUCCINATE 50 MG/1
50 TABLET, EXTENDED RELEASE ORAL DAILY
Qty: 90 TABLET | Refills: 3 | Status: SHIPPED | OUTPATIENT
Start: 2019-05-27 | End: 2020-04-19

## 2019-05-27 NOTE — TELEPHONE ENCOUNTER
Patient informed of his Rx being sent to [facility] and verbally understood the information given.

## 2019-06-24 DIAGNOSIS — I10 ESSENTIAL HYPERTENSION: ICD-10-CM

## 2019-06-24 RX ORDER — LISINOPRIL 10 MG/1
10 TABLET ORAL DAILY
Qty: 90 TABLET | Refills: 3 | Status: SHIPPED | OUTPATIENT
Start: 2019-06-24 | End: 2020-06-03

## 2019-07-12 DIAGNOSIS — I10 ESSENTIAL HYPERTENSION: Primary | ICD-10-CM

## 2019-07-26 ENCOUNTER — OFFICE VISIT (OUTPATIENT)
Dept: CARDIOLOGY | Facility: CLINIC | Age: 69
End: 2019-07-26
Payer: MEDICARE

## 2019-07-26 ENCOUNTER — CLINICAL SUPPORT (OUTPATIENT)
Dept: CARDIOLOGY | Facility: CLINIC | Age: 69
End: 2019-07-26
Payer: MEDICARE

## 2019-07-26 VITALS
WEIGHT: 201.06 LBS | SYSTOLIC BLOOD PRESSURE: 136 MMHG | HEIGHT: 69 IN | HEART RATE: 89 BPM | DIASTOLIC BLOOD PRESSURE: 68 MMHG | BODY MASS INDEX: 29.78 KG/M2

## 2019-07-26 DIAGNOSIS — I10 ESSENTIAL HYPERTENSION: ICD-10-CM

## 2019-07-26 DIAGNOSIS — I48.20 ATRIAL FIBRILLATION, CHRONIC: ICD-10-CM

## 2019-07-26 DIAGNOSIS — I51.7 LVH (LEFT VENTRICULAR HYPERTROPHY): ICD-10-CM

## 2019-07-26 DIAGNOSIS — I42.8 NON-ISCHEMIC CARDIOMYOPATHY: Primary | ICD-10-CM

## 2019-07-26 DIAGNOSIS — R94.31 ABNORMAL ECG: ICD-10-CM

## 2019-07-26 DIAGNOSIS — E78.5 DYSLIPIDEMIA: ICD-10-CM

## 2019-07-26 PROBLEM — M10.071 ACUTE IDIOPATHIC GOUT OF RIGHT FOOT: Status: RESOLVED | Noted: 2017-03-28 | Resolved: 2019-07-26

## 2019-07-26 PROBLEM — R06.09 DOE (DYSPNEA ON EXERTION): Status: RESOLVED | Noted: 2018-05-04 | Resolved: 2019-07-26

## 2019-07-26 PROBLEM — I50.22 CHRONIC SYSTOLIC CONGESTIVE HEART FAILURE: Status: RESOLVED | Noted: 2018-05-04 | Resolved: 2019-07-26

## 2019-07-26 PROBLEM — R94.39 ABNORMAL STRESS TEST: Status: RESOLVED | Noted: 2017-07-05 | Resolved: 2019-07-26

## 2019-07-26 PROCEDURE — 3075F SYST BP GE 130 - 139MM HG: CPT | Mod: S$GLB,,, | Performed by: INTERNAL MEDICINE

## 2019-07-26 PROCEDURE — 3078F PR MOST RECENT DIASTOLIC BLOOD PRESSURE < 80 MM HG: ICD-10-PCS | Mod: S$GLB,,, | Performed by: INTERNAL MEDICINE

## 2019-07-26 PROCEDURE — 99999 PR PBB SHADOW E&M-EST. PATIENT-LVL III: ICD-10-PCS | Mod: PBBFAC,,, | Performed by: INTERNAL MEDICINE

## 2019-07-26 PROCEDURE — 99214 PR OFFICE/OUTPT VISIT, EST, LEVL IV, 30-39 MIN: ICD-10-PCS | Mod: S$GLB,,, | Performed by: INTERNAL MEDICINE

## 2019-07-26 PROCEDURE — 3078F DIAST BP <80 MM HG: CPT | Mod: S$GLB,,, | Performed by: INTERNAL MEDICINE

## 2019-07-26 PROCEDURE — 1101F PR PT FALLS ASSESS DOC 0-1 FALLS W/OUT INJ PAST YR: ICD-10-PCS | Mod: S$GLB,,, | Performed by: INTERNAL MEDICINE

## 2019-07-26 PROCEDURE — 99214 OFFICE O/P EST MOD 30 MIN: CPT | Mod: S$GLB,,, | Performed by: INTERNAL MEDICINE

## 2019-07-26 PROCEDURE — 99999 PR PBB SHADOW E&M-EST. PATIENT-LVL III: CPT | Mod: PBBFAC,,, | Performed by: INTERNAL MEDICINE

## 2019-07-26 PROCEDURE — 1101F PT FALLS ASSESS-DOCD LE1/YR: CPT | Mod: S$GLB,,, | Performed by: INTERNAL MEDICINE

## 2019-07-26 PROCEDURE — 93005 EKG 12-LEAD: ICD-10-PCS | Mod: S$GLB,,, | Performed by: INTERNAL MEDICINE

## 2019-07-26 PROCEDURE — 93010 EKG 12-LEAD: ICD-10-PCS | Mod: S$GLB,,, | Performed by: NUCLEAR MEDICINE

## 2019-07-26 PROCEDURE — 3075F PR MOST RECENT SYSTOLIC BLOOD PRESS GE 130-139MM HG: ICD-10-PCS | Mod: S$GLB,,, | Performed by: INTERNAL MEDICINE

## 2019-07-26 PROCEDURE — 93005 ELECTROCARDIOGRAM TRACING: CPT | Mod: S$GLB,,, | Performed by: INTERNAL MEDICINE

## 2019-07-26 PROCEDURE — 93010 ELECTROCARDIOGRAM REPORT: CPT | Mod: S$GLB,,, | Performed by: NUCLEAR MEDICINE

## 2019-07-26 NOTE — PROGRESS NOTES
Subjective:    Patient ID:  Ezio James is a 68 y.o. male who presents for evaluation of Hyperlipidemia; Hypertension; Coronary Artery Disease; Risk Factor Management For Atherosclerosis; and Atrial Fibrillation      HPI Pt presents for f/u.  His current med conditions include  HTN, hyperlipidemia, CAD, CHF, LVH, cardiomyopathy, permanent a fib. nonsmoker.   Past hx pertinent for following:  He declined anticoagulation multiple times in past (coumadin and NOAC discussed), wanted to stay on asa.   2014 echo 40 - 45%, stress MPI 2014 EF 35%.  S/p [facility] 6/18 for abnl stress test and to more definitively assess LVEF.  Cath showed widely patent coronary arteries (only luminal irregularities) and LVEF 50 - 55%  Now here.  No chest pain sxs.  No unusual dyspnea. No pnd/orthopnea.  HTN well controlled.  ecg today shows a fib with controlled VR, low precordial R waves. No acute changes.  No palpitations. No dizziness.  No TIA/CVA sxs. On asa qd.  Lipids last check much improved after starting statin tx.  Compliant with meds.  No adverse side effects with CV meds.  Some exercise.  Mindful of diet.       Current Outpatient Medications:     aspirin (ECOTRIN) 81 MG EC tablet, Take 1 tablet (81 mg total) by mouth once daily., Disp: 90 tablet, Rfl: 2    cyanocobalamin (VITAMIN B-12) 1000 MCG tablet, Take 100 mcg by mouth once daily., Disp: , Rfl:     levothyroxine (SYNTHROID) 75 MCG tablet, TAKE 1 TABLET DAILY, Disp: 90 tablet, Rfl: 3    lisinopril 10 MG tablet, Take 1 tablet (10 mg total) by mouth once daily., Disp: 90 tablet, Rfl: 3    metoprolol succinate (TOPROL-XL) 50 MG 24 hr tablet, Take 1 tablet (50 mg total) by mouth once daily., Disp: 90 tablet, Rfl: 3    multivitamin capsule, Take 1 capsule by mouth once daily., Disp: , Rfl:     rosuvastatin (CRESTOR) 10 MG tablet, TAKE 1 TABLET EVERY EVENING, Disp: 90 tablet, Rfl: 3    Review of Systems   Constitution: Negative.   HENT: Negative.    Eyes: Negative.   "  Cardiovascular: Negative.    Respiratory: Negative.    Endocrine: Negative.    Hematologic/Lymphatic: Negative.    Skin: Negative.    Musculoskeletal: Negative.    Gastrointestinal: Negative.    Genitourinary: Negative.    Neurological: Negative.    Psychiatric/Behavioral: Negative.    Allergic/Immunologic: Negative.        /68 (BP Location: Right arm, Patient Position: Sitting)   Pulse 89   Ht 5' 9" (1.753 m)   Wt 91.2 kg (201 lb 1 oz)   BMI 29.69 kg/m²       Wt Readings from Last 3 Encounters:   07/26/19 91.2 kg (201 lb 1 oz)   04/24/19 92.8 kg (204 lb 9.4 oz)   01/10/19 92.4 kg (203 lb 11.3 oz)     Temp Readings from Last 3 Encounters:   04/24/19 97.8 °F (36.6 °C) (Tympanic)   10/24/18 97.8 °F (36.6 °C)   06/21/18 97.6 °F (36.4 °C) (Oral)     BP Readings from Last 3 Encounters:   07/26/19 136/68   04/24/19 110/78   01/10/19 112/70     Pulse Readings from Last 3 Encounters:   07/26/19 89   04/24/19 87   01/10/19 76          Objective:    Physical Exam   Constitutional: He is oriented to person, place, and time. He appears well-developed and well-nourished.   HENT:   Head: Normocephalic.   Neck: Normal range of motion. Neck supple. Normal carotid pulses, no hepatojugular reflux and no JVD present. Carotid bruit is not present. No thyromegaly present.   Cardiovascular: Normal rate, S1 normal and S2 normal. An irregularly irregular rhythm present. PMI is not displaced. Exam reveals no S3, no S4, no distant heart sounds, no friction rub, no midsystolic click and no opening snap.   No murmur heard.  Pulses:       Radial pulses are 2+ on the right side, and 2+ on the left side.   Pulmonary/Chest: Effort normal and breath sounds normal. He has no wheezes. He has no rales.   Abdominal: Soft. Bowel sounds are normal. He exhibits no distension, no abdominal bruit, no ascites and no mass. There is no tenderness.   Musculoskeletal: He exhibits no edema.   Neurological: He is alert and oriented to person, place, " and time.   Skin: Skin is warm.   Psychiatric: He has a normal mood and affect. His behavior is normal.   Nursing note and vitals reviewed.      I have reviewed all pertinent labs and cardiac studies.      Chemistry        Component Value Date/Time     06/14/2018 0832     06/14/2018 0832    K 4.7 06/14/2018 0832    K 4.7 06/14/2018 0832     06/14/2018 0832     06/14/2018 0832    CO2 30 (H) 06/14/2018 0832    CO2 30 (H) 06/14/2018 0832    BUN 17 06/14/2018 0832    BUN 17 06/14/2018 0832    CREATININE 0.9 06/14/2018 0832    CREATININE 0.9 06/14/2018 0832    GLU 93 06/14/2018 0832    GLU 93 06/14/2018 0832        Component Value Date/Time    CALCIUM 9.5 06/14/2018 0832    CALCIUM 9.5 06/14/2018 0832    ALKPHOS 59 06/14/2018 0832    AST 23 06/14/2018 0832    ALT 15 06/14/2018 0832    BILITOT 1.5 (H) 06/14/2018 0832    ESTGFRAFRICA >60.0 06/14/2018 0832    ESTGFRAFRICA >60.0 06/14/2018 0832    EGFRNONAA >60.0 06/14/2018 0832    EGFRNONAA >60.0 06/14/2018 0832        Lab Results   Component Value Date    WBC 8.55 06/14/2018    HGB 14.5 06/14/2018    HCT 44.8 06/14/2018    MCV 99 (H) 06/14/2018     06/14/2018     No results found for: LABA1C, HGBA1C  Lab Results   Component Value Date    CHOL 96 (L) 06/14/2018    CHOL 187 04/24/2018    CHOL 154 12/30/2016     Lab Results   Component Value Date    HDL 35 (L) 06/14/2018    HDL 43 04/24/2018    HDL 36 (L) 12/30/2016     Lab Results   Component Value Date    LDLCALC 43.6 (L) 06/14/2018    LDLCALC 102.4 04/24/2018    LDLCALC 98.8 12/30/2016     Lab Results   Component Value Date    TRIG 87 06/14/2018    TRIG 208 (H) 04/24/2018    TRIG 96 12/30/2016     Lab Results   Component Value Date    CHOLHDL 36.5 06/14/2018    CHOLHDL 23.0 04/24/2018    CHOLHDL 23.4 12/30/2016           Assessment:       1. Non-ischemic cardiomyopathy    2. LVH (left ventricular hypertrophy)    3. Essential hypertension    4. Dyslipidemia    5. Atrial fibrillation, chronic     6. Abnormal ECG         Plan:             Stable CV conditions on current medical tx.  Discussed a fib again with pt and pt's elevated CHADS-vasc (HTN, CHF) score typically warranting anticoagulation for CVA protection.  Indications and all risks/benefits discussed of warfarin and NOAC, pros/cons.  Offered low dose Eliquis as well.  Pt does not seem interested in taking anything other than 81 mg ASA qd.  He understands that this is not standard of care tx and he is at risk for CVA.  He will call should he be interested in starting NOAC.  Continue current meds.  Cardiac diet.  Needs more exercise, goal 30 + minutes.  Update labs/lipids -- phone review.  F/u 6 months.

## 2019-08-01 ENCOUNTER — LAB VISIT (OUTPATIENT)
Dept: LAB | Facility: HOSPITAL | Age: 69
End: 2019-08-01
Attending: INTERNAL MEDICINE
Payer: MEDICARE

## 2019-08-01 DIAGNOSIS — I10 ESSENTIAL HYPERTENSION: ICD-10-CM

## 2019-08-01 DIAGNOSIS — I42.8 NON-ISCHEMIC CARDIOMYOPATHY: ICD-10-CM

## 2019-08-01 DIAGNOSIS — E78.5 DYSLIPIDEMIA: ICD-10-CM

## 2019-08-01 LAB
ALBUMIN SERPL BCP-MCNC: 3.7 G/DL (ref 3.5–5.2)
ALP SERPL-CCNC: 52 U/L (ref 55–135)
ALT SERPL W/O P-5'-P-CCNC: 16 U/L (ref 10–44)
ANION GAP SERPL CALC-SCNC: 6 MMOL/L (ref 8–16)
AST SERPL-CCNC: 24 U/L (ref 10–40)
BASOPHILS # BLD AUTO: 0.02 K/UL (ref 0–0.2)
BASOPHILS NFR BLD: 0.3 % (ref 0–1.9)
BILIRUB SERPL-MCNC: 0.9 MG/DL (ref 0.1–1)
BUN SERPL-MCNC: 12 MG/DL (ref 8–23)
CALCIUM SERPL-MCNC: 8.9 MG/DL (ref 8.7–10.5)
CHLORIDE SERPL-SCNC: 107 MMOL/L (ref 95–110)
CHOLEST SERPL-MCNC: 105 MG/DL (ref 120–199)
CHOLEST/HDLC SERPL: 2.6 {RATIO} (ref 2–5)
CO2 SERPL-SCNC: 29 MMOL/L (ref 23–29)
CREAT SERPL-MCNC: 0.9 MG/DL (ref 0.5–1.4)
DIFFERENTIAL METHOD: ABNORMAL
EOSINOPHIL # BLD AUTO: 0.2 K/UL (ref 0–0.5)
EOSINOPHIL NFR BLD: 3.3 % (ref 0–8)
ERYTHROCYTE [DISTWIDTH] IN BLOOD BY AUTOMATED COUNT: 12.2 % (ref 11.5–14.5)
EST. GFR  (AFRICAN AMERICAN): >60 ML/MIN/1.73 M^2
EST. GFR  (NON AFRICAN AMERICAN): >60 ML/MIN/1.73 M^2
GLUCOSE SERPL-MCNC: 94 MG/DL (ref 70–110)
HCT VFR BLD AUTO: 43.2 % (ref 40–54)
HDLC SERPL-MCNC: 40 MG/DL (ref 40–75)
HDLC SERPL: 38.1 % (ref 20–50)
HGB BLD-MCNC: 14.3 G/DL (ref 14–18)
IMM GRANULOCYTES # BLD AUTO: 0.02 K/UL (ref 0–0.04)
IMM GRANULOCYTES NFR BLD AUTO: 0.3 % (ref 0–0.5)
LDLC SERPL CALC-MCNC: 46 MG/DL (ref 63–159)
LYMPHOCYTES # BLD AUTO: 1.9 K/UL (ref 1–4.8)
LYMPHOCYTES NFR BLD: 32.5 % (ref 18–48)
MCH RBC QN AUTO: 32.8 PG (ref 27–31)
MCHC RBC AUTO-ENTMCNC: 33.1 G/DL (ref 32–36)
MCV RBC AUTO: 99 FL (ref 82–98)
MONOCYTES # BLD AUTO: 0.4 K/UL (ref 0.3–1)
MONOCYTES NFR BLD: 6.2 % (ref 4–15)
NEUTROPHILS # BLD AUTO: 3.4 K/UL (ref 1.8–7.7)
NEUTROPHILS NFR BLD: 57.4 % (ref 38–73)
NONHDLC SERPL-MCNC: 65 MG/DL
NRBC BLD-RTO: 0 /100 WBC
PLATELET # BLD AUTO: 217 K/UL (ref 150–350)
PMV BLD AUTO: 10.8 FL (ref 9.2–12.9)
POTASSIUM SERPL-SCNC: 4.6 MMOL/L (ref 3.5–5.1)
PROT SERPL-MCNC: 6.7 G/DL (ref 6–8.4)
RBC # BLD AUTO: 4.36 M/UL (ref 4.6–6.2)
SODIUM SERPL-SCNC: 142 MMOL/L (ref 136–145)
TRIGL SERPL-MCNC: 95 MG/DL (ref 30–150)
WBC # BLD AUTO: 5.84 K/UL (ref 3.9–12.7)

## 2019-08-01 PROCEDURE — 85025 COMPLETE CBC W/AUTO DIFF WBC: CPT

## 2019-08-01 PROCEDURE — 80061 LIPID PANEL: CPT

## 2019-08-01 PROCEDURE — 36415 COLL VENOUS BLD VENIPUNCTURE: CPT

## 2019-08-01 PROCEDURE — 80053 COMPREHEN METABOLIC PANEL: CPT

## 2019-08-04 ENCOUNTER — TELEPHONE (OUTPATIENT)
Dept: CARDIOLOGY | Facility: HOSPITAL | Age: 69
End: 2019-08-04

## 2019-08-05 NOTE — TELEPHONE ENCOUNTER
I have attempted without success to contact this patient by phone to advise patient that his Cholesterol is great.  Labs stable.  F/u next appt.

## 2019-08-12 ENCOUNTER — TELEPHONE (OUTPATIENT)
Dept: CARDIOLOGY | Facility: CLINIC | Age: 69
End: 2019-08-12

## 2019-08-12 NOTE — TELEPHONE ENCOUNTER
Spoke with patient to advise him that his Cholesterol is great.  Labs stable.  F/u next appt.  Denies any questions/concerns.

## 2019-10-23 ENCOUNTER — PATIENT OUTREACH (OUTPATIENT)
Dept: ADMINISTRATIVE | Facility: HOSPITAL | Age: 69
End: 2019-10-23

## 2019-11-06 ENCOUNTER — OFFICE VISIT (OUTPATIENT)
Dept: INTERNAL MEDICINE | Facility: CLINIC | Age: 69
End: 2019-11-06
Payer: MEDICARE

## 2019-11-06 VITALS
HEART RATE: 75 BPM | DIASTOLIC BLOOD PRESSURE: 72 MMHG | HEIGHT: 69 IN | SYSTOLIC BLOOD PRESSURE: 118 MMHG | TEMPERATURE: 98 F | OXYGEN SATURATION: 97 % | WEIGHT: 199.31 LBS | BODY MASS INDEX: 29.52 KG/M2

## 2019-11-06 DIAGNOSIS — Z12.5 SCREENING FOR PROSTATE CANCER: ICD-10-CM

## 2019-11-06 DIAGNOSIS — I10 ESSENTIAL HYPERTENSION: Primary | ICD-10-CM

## 2019-11-06 DIAGNOSIS — I48.20 ATRIAL FIBRILLATION, CHRONIC: ICD-10-CM

## 2019-11-06 DIAGNOSIS — E03.4 HYPOTHYROIDISM DUE TO ACQUIRED ATROPHY OF THYROID: ICD-10-CM

## 2019-11-06 DIAGNOSIS — R25.1 TREMOR: ICD-10-CM

## 2019-11-06 DIAGNOSIS — I42.9 CARDIOMYOPATHY, UNSPECIFIED TYPE: ICD-10-CM

## 2019-11-06 PROBLEM — R05.9 COUGH: Status: RESOLVED | Noted: 2017-10-05 | Resolved: 2019-11-06

## 2019-11-06 PROCEDURE — 99999 PR PBB SHADOW E&M-EST. PATIENT-LVL III: CPT | Mod: PBBFAC,,, | Performed by: FAMILY MEDICINE

## 2019-11-06 PROCEDURE — 1101F PT FALLS ASSESS-DOCD LE1/YR: CPT | Mod: S$GLB,,, | Performed by: FAMILY MEDICINE

## 2019-11-06 PROCEDURE — 3074F SYST BP LT 130 MM HG: CPT | Mod: S$GLB,,, | Performed by: FAMILY MEDICINE

## 2019-11-06 PROCEDURE — 99214 OFFICE O/P EST MOD 30 MIN: CPT | Mod: S$GLB,,, | Performed by: FAMILY MEDICINE

## 2019-11-06 PROCEDURE — 99999 PR PBB SHADOW E&M-EST. PATIENT-LVL III: ICD-10-PCS | Mod: PBBFAC,,, | Performed by: FAMILY MEDICINE

## 2019-11-06 PROCEDURE — 3078F DIAST BP <80 MM HG: CPT | Mod: S$GLB,,, | Performed by: FAMILY MEDICINE

## 2019-11-06 PROCEDURE — 3078F PR MOST RECENT DIASTOLIC BLOOD PRESSURE < 80 MM HG: ICD-10-PCS | Mod: S$GLB,,, | Performed by: FAMILY MEDICINE

## 2019-11-06 PROCEDURE — 3074F PR MOST RECENT SYSTOLIC BLOOD PRESSURE < 130 MM HG: ICD-10-PCS | Mod: S$GLB,,, | Performed by: FAMILY MEDICINE

## 2019-11-06 PROCEDURE — 99214 PR OFFICE/OUTPT VISIT, EST, LEVL IV, 30-39 MIN: ICD-10-PCS | Mod: S$GLB,,, | Performed by: FAMILY MEDICINE

## 2019-11-06 PROCEDURE — 1101F PR PT FALLS ASSESS DOC 0-1 FALLS W/OUT INJ PAST YR: ICD-10-PCS | Mod: S$GLB,,, | Performed by: FAMILY MEDICINE

## 2019-11-06 NOTE — PROGRESS NOTES
Follow-up hypertension hypothyroidism cardiomegaly hyperlipidemia tremor.  Denies headache chest pain palpitations shortness of breath or edema. About 5 you duration of right hand tremor mostly when writing.  He has no rest tremor.  His brother was diagnosed with parkinsonism.  He is also followed by Cardiology.  Subjective:       Patient ID: Ezio James is a 68 y.o. male.    Chief Complaint: Follow-up    HPI  Review of Systems   Constitutional: Negative for appetite change, chills, fatigue, fever and unexpected weight change.   Respiratory: Negative for cough, choking, chest tightness, shortness of breath and wheezing.    Cardiovascular: Negative for chest pain, palpitations and leg swelling.        Denies lightheadedness   Genitourinary: Negative for difficulty urinating.   Neurological: Positive for tremors. Negative for dizziness, weakness, light-headedness and headaches.       Objective:      Physical Exam   Constitutional: He is oriented to person, place, and time. He appears well-developed and well-nourished. No distress.   Neck: Neck supple. No JVD present. No tracheal deviation present. No thyromegaly present.   Cardiovascular: Normal rate and normal heart sounds. Exam reveals no gallop.   No murmur heard.  Irregular rhythm  pulse rate of 75   Pulmonary/Chest: Effort normal and breath sounds normal. No respiratory distress. He has no wheezes. He has no rales.   Abdominal: Soft. Bowel sounds are normal. He exhibits no mass. There is no tenderness.   Lymphadenopathy:     He has no cervical adenopathy.   Neurological: He is alert and oriented to person, place, and time. He exhibits normal muscle tone. Coordination normal.   He has a tremor of the right hand when holding a pen.   Skin: He is not diaphoretic.       Lab Visit on 08/01/2019   Component Date Value Ref Range Status    Sodium 08/01/2019 142  136 - 145 mmol/L Final    Potassium 08/01/2019 4.6  3.5 - 5.1 mmol/L Final    Chloride 08/01/2019 107  95  - 110 mmol/L Final    CO2 08/01/2019 29  23 - 29 mmol/L Final    Glucose 08/01/2019 94  70 - 110 mg/dL Final    BUN, Bld 08/01/2019 12  8 - 23 mg/dL Final    Creatinine 08/01/2019 0.9  0.5 - 1.4 mg/dL Final    Calcium 08/01/2019 8.9  8.7 - 10.5 mg/dL Final    Total Protein 08/01/2019 6.7  6.0 - 8.4 g/dL Final    Albumin 08/01/2019 3.7  3.5 - 5.2 g/dL Final    Total Bilirubin 08/01/2019 0.9  0.1 - 1.0 mg/dL Final    Alkaline Phosphatase 08/01/2019 52* 55 - 135 U/L Final    AST 08/01/2019 24  10 - 40 U/L Final    ALT 08/01/2019 16  10 - 44 U/L Final    Anion Gap 08/01/2019 6* 8 - 16 mmol/L Final    eGFR if African American 08/01/2019 >60.0  >60 mL/min/1.73 m^2 Final    eGFR if non African American 08/01/2019 >60.0  >60 mL/min/1.73 m^2 Final    WBC 08/01/2019 5.84  3.90 - 12.70 K/uL Final    RBC 08/01/2019 4.36* 4.60 - 6.20 M/uL Final    Hemoglobin 08/01/2019 14.3  14.0 - 18.0 g/dL Final    Hematocrit 08/01/2019 43.2  40.0 - 54.0 % Final    Mean Corpuscular Volume 08/01/2019 99* 82 - 98 fL Final    Mean Corpuscular Hemoglobin 08/01/2019 32.8* 27.0 - 31.0 pg Final    Mean Corpuscular Hemoglobin Conc 08/01/2019 33.1  32.0 - 36.0 g/dL Final    RDW 08/01/2019 12.2  11.5 - 14.5 % Final    Platelets 08/01/2019 217  150 - 350 K/uL Final    MPV 08/01/2019 10.8  9.2 - 12.9 fL Final    Immature Granulocytes 08/01/2019 0.3  0.0 - 0.5 % Final    Gran # (ANC) 08/01/2019 3.4  1.8 - 7.7 K/uL Final    Immature Grans (Abs) 08/01/2019 0.02  0.00 - 0.04 K/uL Final    Lymph # 08/01/2019 1.9  1.0 - 4.8 K/uL Final    Mono # 08/01/2019 0.4  0.3 - 1.0 K/uL Final    Eos # 08/01/2019 0.2  0.0 - 0.5 K/uL Final    Baso # 08/01/2019 0.02  0.00 - 0.20 K/uL Final    nRBC 08/01/2019 0  0 /100 WBC Final    Gran% 08/01/2019 57.4  38.0 - 73.0 % Final    Lymph% 08/01/2019 32.5  18.0 - 48.0 % Final    Mono% 08/01/2019 6.2  4.0 - 15.0 % Final    Eosinophil% 08/01/2019 3.3  0.0 - 8.0 % Final    Basophil% 08/01/2019 0.3   0.0 - 1.9 % Final    Differential Method 08/01/2019 Automated   Final    Cholesterol 08/01/2019 105* 120 - 199 mg/dL Final    Triglycerides 08/01/2019 95  30 - 150 mg/dL Final    HDL 08/01/2019 40  40 - 75 mg/dL Final    LDL Cholesterol 08/01/2019 46.0* 63.0 - 159.0 mg/dL Final    Hdl/Cholesterol Ratio 08/01/2019 38.1  20.0 - 50.0 % Final    Total Cholesterol/HDL Ratio 08/01/2019 2.6  2.0 - 5.0 Final    Non-HDL Cholesterol 08/01/2019 65  mg/dL Final     Assessment:       1. Tremor    2. Essential hypertension    3. Atrial fibrillation, chronic    4. Hypothyroidism due to acquired atrophy of thyroid    5. Cardiomyopathy, unspecified type    6. Screening for prostate cancer        Plan:     Blood pressure controlled 118/72.  Medications reviewed.  Lab was ordered.  Neurology referral for tremor evaluation.  Health maintenance reviewed.  Discussed need for shingles immunization at the pharmacy.  Follow-up in 6 months.    Tremor  -     Ambulatory referral to Neurology    Essential hypertension  -     CBC auto differential; Future; Expected date: 11/06/2019  -     Urinalysis; Future; Expected date: 11/06/2019  -     Comprehensive metabolic panel; Future; Expected date: 11/06/2019  -     Lipid panel; Future; Expected date: 11/06/2019    Atrial fibrillation, chronic    Hypothyroidism due to acquired atrophy of thyroid  -     TSH; Future; Expected date: 11/06/2019  -     T4, free; Future; Expected date: 11/06/2019    Cardiomyopathy, unspecified type    Screening for prostate cancer  -     PSA, Screening; Future; Expected date: 11/06/2019

## 2019-11-07 ENCOUNTER — CLINICAL SUPPORT (OUTPATIENT)
Dept: INTERNAL MEDICINE | Facility: CLINIC | Age: 69
End: 2019-11-07
Payer: MEDICARE

## 2019-11-07 DIAGNOSIS — Z12.5 SCREENING FOR PROSTATE CANCER: ICD-10-CM

## 2019-11-07 DIAGNOSIS — I10 ESSENTIAL HYPERTENSION: ICD-10-CM

## 2019-11-07 DIAGNOSIS — E03.4 HYPOTHYROIDISM DUE TO ACQUIRED ATROPHY OF THYROID: ICD-10-CM

## 2019-11-07 LAB
ALBUMIN SERPL BCP-MCNC: 3.7 G/DL (ref 3.5–5.2)
ALP SERPL-CCNC: 63 U/L (ref 55–135)
ALT SERPL W/O P-5'-P-CCNC: 16 U/L (ref 10–44)
ANION GAP SERPL CALC-SCNC: 5 MMOL/L (ref 8–16)
AST SERPL-CCNC: 22 U/L (ref 10–40)
BASOPHILS # BLD AUTO: 0.03 K/UL (ref 0–0.2)
BASOPHILS NFR BLD: 0.5 % (ref 0–1.9)
BILIRUB SERPL-MCNC: 0.6 MG/DL (ref 0.1–1)
BILIRUB UR QL STRIP: NEGATIVE
BUN SERPL-MCNC: 15 MG/DL (ref 8–23)
CALCIUM SERPL-MCNC: 9.2 MG/DL (ref 8.7–10.5)
CHLORIDE SERPL-SCNC: 108 MMOL/L (ref 95–110)
CHOLEST SERPL-MCNC: 118 MG/DL (ref 120–199)
CHOLEST/HDLC SERPL: 3 {RATIO} (ref 2–5)
CLARITY UR REFRACT.AUTO: CLEAR
CO2 SERPL-SCNC: 30 MMOL/L (ref 23–29)
COLOR UR AUTO: YELLOW
COMPLEXED PSA SERPL-MCNC: 0.31 NG/ML (ref 0–4)
CREAT SERPL-MCNC: 1.1 MG/DL (ref 0.5–1.4)
DIFFERENTIAL METHOD: ABNORMAL
EOSINOPHIL # BLD AUTO: 0.1 K/UL (ref 0–0.5)
EOSINOPHIL NFR BLD: 1.6 % (ref 0–8)
ERYTHROCYTE [DISTWIDTH] IN BLOOD BY AUTOMATED COUNT: 12.1 % (ref 11.5–14.5)
EST. GFR  (AFRICAN AMERICAN): >60 ML/MIN/1.73 M^2
EST. GFR  (NON AFRICAN AMERICAN): >60 ML/MIN/1.73 M^2
GLUCOSE SERPL-MCNC: 97 MG/DL (ref 70–110)
GLUCOSE UR QL STRIP: NEGATIVE
HCT VFR BLD AUTO: 42.9 % (ref 40–54)
HDLC SERPL-MCNC: 39 MG/DL (ref 40–75)
HDLC SERPL: 33.1 % (ref 20–50)
HGB BLD-MCNC: 13.7 G/DL (ref 14–18)
HGB UR QL STRIP: NEGATIVE
IMM GRANULOCYTES # BLD AUTO: 0.01 K/UL (ref 0–0.04)
IMM GRANULOCYTES NFR BLD AUTO: 0.2 % (ref 0–0.5)
KETONES UR QL STRIP: NEGATIVE
LDLC SERPL CALC-MCNC: 58.6 MG/DL (ref 63–159)
LEUKOCYTE ESTERASE UR QL STRIP: NEGATIVE
LYMPHOCYTES # BLD AUTO: 1.9 K/UL (ref 1–4.8)
LYMPHOCYTES NFR BLD: 29.2 % (ref 18–48)
MCH RBC QN AUTO: 31.8 PG (ref 27–31)
MCHC RBC AUTO-ENTMCNC: 31.9 G/DL (ref 32–36)
MCV RBC AUTO: 100 FL (ref 82–98)
MONOCYTES # BLD AUTO: 0.3 K/UL (ref 0.3–1)
MONOCYTES NFR BLD: 5.3 % (ref 4–15)
NEUTROPHILS # BLD AUTO: 4 K/UL (ref 1.8–7.7)
NEUTROPHILS NFR BLD: 63.2 % (ref 38–73)
NITRITE UR QL STRIP: NEGATIVE
NONHDLC SERPL-MCNC: 79 MG/DL
NRBC BLD-RTO: 0 /100 WBC
PH UR STRIP: 5 [PH] (ref 5–8)
PLATELET # BLD AUTO: 286 K/UL (ref 150–350)
PMV BLD AUTO: 11.1 FL (ref 9.2–12.9)
POTASSIUM SERPL-SCNC: 5 MMOL/L (ref 3.5–5.1)
PROT SERPL-MCNC: 7.1 G/DL (ref 6–8.4)
PROT UR QL STRIP: NEGATIVE
RBC # BLD AUTO: 4.31 M/UL (ref 4.6–6.2)
SODIUM SERPL-SCNC: 143 MMOL/L (ref 136–145)
SP GR UR STRIP: 1.01 (ref 1–1.03)
T4 FREE SERPL-MCNC: 1.09 NG/DL (ref 0.71–1.51)
TRIGL SERPL-MCNC: 102 MG/DL (ref 30–150)
TSH SERPL DL<=0.005 MIU/L-ACNC: 2.52 UIU/ML (ref 0.4–4)
URN SPEC COLLECT METH UR: NORMAL
WBC # BLD AUTO: 6.38 K/UL (ref 3.9–12.7)

## 2019-11-07 PROCEDURE — 85025 COMPLETE CBC W/AUTO DIFF WBC: CPT

## 2019-11-07 PROCEDURE — 80061 LIPID PANEL: CPT

## 2019-11-07 PROCEDURE — 80053 COMPREHEN METABOLIC PANEL: CPT

## 2019-11-07 PROCEDURE — 84439 ASSAY OF FREE THYROXINE: CPT

## 2019-11-07 PROCEDURE — 36415 COLL VENOUS BLD VENIPUNCTURE: CPT

## 2019-11-07 PROCEDURE — 81003 URINALYSIS AUTO W/O SCOPE: CPT

## 2019-11-07 PROCEDURE — 84153 ASSAY OF PSA TOTAL: CPT

## 2019-11-07 PROCEDURE — 84443 ASSAY THYROID STIM HORMONE: CPT

## 2019-11-08 ENCOUNTER — PES CALL (OUTPATIENT)
Dept: ADMINISTRATIVE | Facility: CLINIC | Age: 69
End: 2019-11-08

## 2019-11-11 NOTE — PROGRESS NOTES
Contacted pt to inform of recent lab results. Received no answer. Message left for patient to return my call.

## 2019-11-14 ENCOUNTER — PES CALL (OUTPATIENT)
Dept: ADMINISTRATIVE | Facility: CLINIC | Age: 69
End: 2019-11-14

## 2019-12-10 ENCOUNTER — PATIENT OUTREACH (OUTPATIENT)
Dept: ADMINISTRATIVE | Facility: HOSPITAL | Age: 69
End: 2019-12-10

## 2019-12-10 DIAGNOSIS — Z86.010 HISTORY OF COLON POLYPS: ICD-10-CM

## 2019-12-10 PROBLEM — Z86.0100 HISTORY OF COLON POLYPS: Status: ACTIVE | Noted: 2019-12-04

## 2020-03-23 DIAGNOSIS — E03.4 HYPOTHYROIDISM DUE TO ACQUIRED ATROPHY OF THYROID: ICD-10-CM

## 2020-03-24 RX ORDER — LEVOTHYROXINE SODIUM 75 UG/1
TABLET ORAL
Qty: 90 TABLET | Refills: 3 | Status: SHIPPED | OUTPATIENT
Start: 2020-03-24 | End: 2021-02-18 | Stop reason: SDUPTHER

## 2020-04-19 DIAGNOSIS — E78.5 DYSLIPIDEMIA: ICD-10-CM

## 2020-04-19 DIAGNOSIS — I42.9 CARDIOMYOPATHY, UNSPECIFIED TYPE: ICD-10-CM

## 2020-04-19 RX ORDER — METOPROLOL SUCCINATE 50 MG/1
TABLET, EXTENDED RELEASE ORAL
Qty: 90 TABLET | Refills: 3 | Status: SHIPPED | OUTPATIENT
Start: 2020-04-19 | End: 2021-02-18 | Stop reason: SDUPTHER

## 2020-04-19 RX ORDER — ROSUVASTATIN CALCIUM 10 MG/1
TABLET, COATED ORAL
Qty: 90 TABLET | Refills: 3 | Status: SHIPPED | OUTPATIENT
Start: 2020-04-19 | End: 2021-04-19

## 2020-10-08 ENCOUNTER — PATIENT OUTREACH (OUTPATIENT)
Dept: ADMINISTRATIVE | Facility: HOSPITAL | Age: 70
End: 2020-10-08

## 2020-11-02 ENCOUNTER — PATIENT OUTREACH (OUTPATIENT)
Dept: ADMINISTRATIVE | Facility: HOSPITAL | Age: 70
End: 2020-11-02

## 2020-11-02 NOTE — PROGRESS NOTES
Working [facility] HTN Report:     Pt due for annual exam and updated BP reading. No answer, left message for a return call.

## 2020-11-20 ENCOUNTER — PES CALL (OUTPATIENT)
Dept: ADMINISTRATIVE | Facility: CLINIC | Age: 70
End: 2020-11-20

## 2020-11-23 ENCOUNTER — PES CALL (OUTPATIENT)
Dept: ADMINISTRATIVE | Facility: CLINIC | Age: 70
End: 2020-11-23

## 2021-01-28 ENCOUNTER — PATIENT OUTREACH (OUTPATIENT)
Dept: ADMINISTRATIVE | Facility: HOSPITAL | Age: 71
End: 2021-01-28

## 2021-01-29 ENCOUNTER — OFFICE VISIT (OUTPATIENT)
Dept: CARDIOLOGY | Facility: CLINIC | Age: 71
End: 2021-01-29
Payer: MEDICARE

## 2021-01-29 ENCOUNTER — HOSPITAL ENCOUNTER (OUTPATIENT)
Dept: CARDIOLOGY | Facility: HOSPITAL | Age: 71
Discharge: HOME | End: 2021-01-29
Attending: INTERNAL MEDICINE
Payer: MEDICARE

## 2021-01-29 VITALS
WEIGHT: 202.63 LBS | BODY MASS INDEX: 30.01 KG/M2 | HEIGHT: 69 IN | OXYGEN SATURATION: 99 % | RESPIRATION RATE: 16 BRPM | HEART RATE: 77 BPM | SYSTOLIC BLOOD PRESSURE: 120 MMHG | DIASTOLIC BLOOD PRESSURE: 78 MMHG

## 2021-01-29 DIAGNOSIS — I42.8 NON-ISCHEMIC CARDIOMYOPATHY: Primary | ICD-10-CM

## 2021-01-29 DIAGNOSIS — E78.6 LOW HDL (UNDER 40): ICD-10-CM

## 2021-01-29 DIAGNOSIS — I48.20 ATRIAL FIBRILLATION, CHRONIC: ICD-10-CM

## 2021-01-29 DIAGNOSIS — I51.7 LVH (LEFT VENTRICULAR HYPERTROPHY): ICD-10-CM

## 2021-01-29 DIAGNOSIS — E78.5 DYSLIPIDEMIA: ICD-10-CM

## 2021-01-29 DIAGNOSIS — I42.9 CARDIOMYOPATHY, UNSPECIFIED TYPE: ICD-10-CM

## 2021-01-29 DIAGNOSIS — I10 ESSENTIAL HYPERTENSION: ICD-10-CM

## 2021-01-29 DIAGNOSIS — R94.31 ABNORMAL ECG: ICD-10-CM

## 2021-01-29 PROCEDURE — 3074F SYST BP LT 130 MM HG: CPT | Mod: S$GLB,,, | Performed by: INTERNAL MEDICINE

## 2021-01-29 PROCEDURE — 93010 ELECTROCARDIOGRAM REPORT: CPT | Mod: ,,, | Performed by: INTERNAL MEDICINE

## 2021-01-29 PROCEDURE — 1159F MED LIST DOCD IN RCRD: CPT | Mod: S$GLB,,, | Performed by: INTERNAL MEDICINE

## 2021-01-29 PROCEDURE — 1126F AMNT PAIN NOTED NONE PRSNT: CPT | Mod: S$GLB,,, | Performed by: INTERNAL MEDICINE

## 2021-01-29 PROCEDURE — 3078F DIAST BP <80 MM HG: CPT | Mod: S$GLB,,, | Performed by: INTERNAL MEDICINE

## 2021-01-29 PROCEDURE — 3074F PR MOST RECENT SYSTOLIC BLOOD PRESSURE < 130 MM HG: ICD-10-PCS | Mod: S$GLB,,, | Performed by: INTERNAL MEDICINE

## 2021-01-29 PROCEDURE — 99214 OFFICE O/P EST MOD 30 MIN: CPT | Mod: S$GLB,,, | Performed by: INTERNAL MEDICINE

## 2021-01-29 PROCEDURE — 99999 PR PBB SHADOW E&M-EST. PATIENT-LVL III: ICD-10-PCS | Mod: PBBFAC,,, | Performed by: INTERNAL MEDICINE

## 2021-01-29 PROCEDURE — 3008F PR BODY MASS INDEX (BMI) DOCUMENTED: ICD-10-PCS | Mod: S$GLB,,, | Performed by: INTERNAL MEDICINE

## 2021-01-29 PROCEDURE — 1126F PR PAIN SEVERITY QUANTIFIED, NO PAIN PRESENT: ICD-10-PCS | Mod: S$GLB,,, | Performed by: INTERNAL MEDICINE

## 2021-01-29 PROCEDURE — 93005 ELECTROCARDIOGRAM TRACING: CPT

## 2021-01-29 PROCEDURE — 99999 PR PBB SHADOW E&M-EST. PATIENT-LVL III: CPT | Mod: PBBFAC,,, | Performed by: INTERNAL MEDICINE

## 2021-01-29 PROCEDURE — 3078F PR MOST RECENT DIASTOLIC BLOOD PRESSURE < 80 MM HG: ICD-10-PCS | Mod: S$GLB,,, | Performed by: INTERNAL MEDICINE

## 2021-01-29 PROCEDURE — 1159F PR MEDICATION LIST DOCUMENTED IN MEDICAL RECORD: ICD-10-PCS | Mod: S$GLB,,, | Performed by: INTERNAL MEDICINE

## 2021-01-29 PROCEDURE — 3008F BODY MASS INDEX DOCD: CPT | Mod: S$GLB,,, | Performed by: INTERNAL MEDICINE

## 2021-01-29 PROCEDURE — 99214 PR OFFICE/OUTPT VISIT, EST, LEVL IV, 30-39 MIN: ICD-10-PCS | Mod: S$GLB,,, | Performed by: INTERNAL MEDICINE

## 2021-01-29 PROCEDURE — 93010 EKG 12-LEAD: ICD-10-PCS | Mod: ,,, | Performed by: INTERNAL MEDICINE

## 2021-02-01 DIAGNOSIS — R94.31 ABNORMAL ECG: ICD-10-CM

## 2021-02-01 DIAGNOSIS — I48.20 ATRIAL FIBRILLATION, CHRONIC: Primary | ICD-10-CM

## 2021-02-01 DIAGNOSIS — I10 ESSENTIAL HYPERTENSION: ICD-10-CM

## 2021-02-01 DIAGNOSIS — I42.9 CARDIOMYOPATHY, UNSPECIFIED TYPE: ICD-10-CM

## 2021-02-18 ENCOUNTER — IMMUNIZATION (OUTPATIENT)
Dept: INTERNAL MEDICINE | Facility: CLINIC | Age: 71
End: 2021-02-18
Payer: MEDICARE

## 2021-02-18 ENCOUNTER — OFFICE VISIT (OUTPATIENT)
Dept: INTERNAL MEDICINE | Facility: CLINIC | Age: 71
End: 2021-02-18
Payer: MEDICARE

## 2021-02-18 VITALS
HEIGHT: 69 IN | OXYGEN SATURATION: 100 % | BODY MASS INDEX: 30.23 KG/M2 | HEART RATE: 83 BPM | SYSTOLIC BLOOD PRESSURE: 110 MMHG | WEIGHT: 204.13 LBS | DIASTOLIC BLOOD PRESSURE: 84 MMHG | TEMPERATURE: 98 F

## 2021-02-18 DIAGNOSIS — R31.9 HEMATURIA, UNSPECIFIED TYPE: ICD-10-CM

## 2021-02-18 DIAGNOSIS — Z23 NEED FOR VACCINATION: Primary | ICD-10-CM

## 2021-02-18 DIAGNOSIS — E03.4 HYPOTHYROIDISM DUE TO ACQUIRED ATROPHY OF THYROID: ICD-10-CM

## 2021-02-18 DIAGNOSIS — I42.9 CARDIOMYOPATHY, UNSPECIFIED TYPE: ICD-10-CM

## 2021-02-18 DIAGNOSIS — E53.8 B12 DEFICIENCY: ICD-10-CM

## 2021-02-18 DIAGNOSIS — E03.8 SUBCLINICAL HYPOTHYROIDISM: ICD-10-CM

## 2021-02-18 DIAGNOSIS — Z12.5 SCREENING FOR PROSTATE CANCER: ICD-10-CM

## 2021-02-18 DIAGNOSIS — I10 ESSENTIAL HYPERTENSION: ICD-10-CM

## 2021-02-18 DIAGNOSIS — I48.20 ATRIAL FIBRILLATION, CHRONIC: ICD-10-CM

## 2021-02-18 DIAGNOSIS — Z00.00 ANNUAL PHYSICAL EXAM: Primary | ICD-10-CM

## 2021-02-18 PROCEDURE — 3079F PR MOST RECENT DIASTOLIC BLOOD PRESSURE 80-89 MM HG: ICD-10-PCS | Mod: S$GLB,,, | Performed by: FAMILY MEDICINE

## 2021-02-18 PROCEDURE — 99999 PR PBB SHADOW E&M-EST. PATIENT-LVL III: ICD-10-PCS | Mod: PBBFAC,,, | Performed by: FAMILY MEDICINE

## 2021-02-18 PROCEDURE — 3008F PR BODY MASS INDEX (BMI) DOCUMENTED: ICD-10-PCS | Mod: S$GLB,,, | Performed by: FAMILY MEDICINE

## 2021-02-18 PROCEDURE — 1159F MED LIST DOCD IN RCRD: CPT | Mod: S$GLB,,, | Performed by: FAMILY MEDICINE

## 2021-02-18 PROCEDURE — 99999 PR PBB SHADOW E&M-EST. PATIENT-LVL III: CPT | Mod: PBBFAC,,, | Performed by: FAMILY MEDICINE

## 2021-02-18 PROCEDURE — 99214 OFFICE O/P EST MOD 30 MIN: CPT | Mod: S$GLB,,, | Performed by: FAMILY MEDICINE

## 2021-02-18 PROCEDURE — 3074F PR MOST RECENT SYSTOLIC BLOOD PRESSURE < 130 MM HG: ICD-10-PCS | Mod: S$GLB,,, | Performed by: FAMILY MEDICINE

## 2021-02-18 PROCEDURE — 1126F AMNT PAIN NOTED NONE PRSNT: CPT | Mod: S$GLB,,, | Performed by: FAMILY MEDICINE

## 2021-02-18 PROCEDURE — 1159F PR MEDICATION LIST DOCUMENTED IN MEDICAL RECORD: ICD-10-PCS | Mod: S$GLB,,, | Performed by: FAMILY MEDICINE

## 2021-02-18 PROCEDURE — 1101F PT FALLS ASSESS-DOCD LE1/YR: CPT | Mod: S$GLB,,, | Performed by: FAMILY MEDICINE

## 2021-02-18 PROCEDURE — 3288F PR FALLS RISK ASSESSMENT DOCUMENTED: ICD-10-PCS | Mod: S$GLB,,, | Performed by: FAMILY MEDICINE

## 2021-02-18 PROCEDURE — 3008F BODY MASS INDEX DOCD: CPT | Mod: S$GLB,,, | Performed by: FAMILY MEDICINE

## 2021-02-18 PROCEDURE — 3288F FALL RISK ASSESSMENT DOCD: CPT | Mod: S$GLB,,, | Performed by: FAMILY MEDICINE

## 2021-02-18 PROCEDURE — 91300 COVID-19, MRNA, LNP-S, PF, 30 MCG/0.3 ML DOSE VACCINE: CPT | Mod: PBBFAC | Performed by: FAMILY MEDICINE

## 2021-02-18 PROCEDURE — 1101F PR PT FALLS ASSESS DOC 0-1 FALLS W/OUT INJ PAST YR: ICD-10-PCS | Mod: S$GLB,,, | Performed by: FAMILY MEDICINE

## 2021-02-18 PROCEDURE — 1126F PR PAIN SEVERITY QUANTIFIED, NO PAIN PRESENT: ICD-10-PCS | Mod: S$GLB,,, | Performed by: FAMILY MEDICINE

## 2021-02-18 PROCEDURE — 3079F DIAST BP 80-89 MM HG: CPT | Mod: S$GLB,,, | Performed by: FAMILY MEDICINE

## 2021-02-18 PROCEDURE — 3074F SYST BP LT 130 MM HG: CPT | Mod: S$GLB,,, | Performed by: FAMILY MEDICINE

## 2021-02-18 PROCEDURE — 99214 PR OFFICE/OUTPT VISIT, EST, LEVL IV, 30-39 MIN: ICD-10-PCS | Mod: S$GLB,,, | Performed by: FAMILY MEDICINE

## 2021-02-18 RX ORDER — LEVOTHYROXINE SODIUM 75 UG/1
75 TABLET ORAL DAILY
Qty: 90 TABLET | Refills: 3 | Status: SHIPPED | OUTPATIENT
Start: 2021-02-18 | End: 2021-04-03 | Stop reason: SDUPTHER

## 2021-02-18 RX ORDER — METOPROLOL SUCCINATE 50 MG/1
50 TABLET, EXTENDED RELEASE ORAL DAILY
Qty: 90 TABLET | Refills: 3 | Status: SHIPPED | OUTPATIENT
Start: 2021-02-18 | End: 2021-05-17 | Stop reason: SDUPTHER

## 2021-03-11 ENCOUNTER — IMMUNIZATION (OUTPATIENT)
Dept: INTERNAL MEDICINE | Facility: CLINIC | Age: 71
End: 2021-03-11
Payer: MEDICARE

## 2021-03-11 DIAGNOSIS — Z23 NEED FOR VACCINATION: Primary | ICD-10-CM

## 2021-03-11 PROCEDURE — 0002A COVID-19, MRNA, LNP-S, PF, 30 MCG/0.3 ML DOSE VACCINE: CPT | Mod: PBBFAC | Performed by: FAMILY MEDICINE

## 2021-03-11 PROCEDURE — 91300 COVID-19, MRNA, LNP-S, PF, 30 MCG/0.3 ML DOSE VACCINE: CPT | Mod: PBBFAC | Performed by: FAMILY MEDICINE

## 2021-03-22 ENCOUNTER — PATIENT MESSAGE (OUTPATIENT)
Dept: CARDIOLOGY | Facility: CLINIC | Age: 71
End: 2021-03-22

## 2021-03-22 DIAGNOSIS — I10 ESSENTIAL HYPERTENSION: ICD-10-CM

## 2021-03-22 RX ORDER — LISINOPRIL 10 MG/1
10 TABLET ORAL DAILY
Qty: 90 TABLET | Refills: 3 | Status: SHIPPED | OUTPATIENT
Start: 2021-03-22 | End: 2021-05-17 | Stop reason: SDUPTHER

## 2021-05-17 DIAGNOSIS — I10 ESSENTIAL HYPERTENSION: ICD-10-CM

## 2021-05-17 DIAGNOSIS — I42.9 CARDIOMYOPATHY, UNSPECIFIED TYPE: ICD-10-CM

## 2021-05-17 RX ORDER — METOPROLOL SUCCINATE 50 MG/1
50 TABLET, EXTENDED RELEASE ORAL DAILY
Qty: 90 TABLET | Refills: 3 | Status: SHIPPED | OUTPATIENT
Start: 2021-05-17 | End: 2022-04-25

## 2021-05-17 RX ORDER — LISINOPRIL 10 MG/1
10 TABLET ORAL DAILY
Qty: 90 TABLET | Refills: 3 | Status: SHIPPED | OUTPATIENT
Start: 2021-05-17 | End: 2022-05-31 | Stop reason: SDUPTHER

## 2021-05-24 PROBLEM — Z00.00 ANNUAL PHYSICAL EXAM: Status: RESOLVED | Noted: 2018-04-24 | Resolved: 2021-05-24

## 2021-07-09 ENCOUNTER — TELEPHONE (OUTPATIENT)
Dept: INTERNAL MEDICINE | Facility: CLINIC | Age: 71
End: 2021-07-09

## 2021-07-09 ENCOUNTER — OFFICE VISIT (OUTPATIENT)
Dept: URGENT CARE | Facility: CLINIC | Age: 71
End: 2021-07-09
Payer: MEDICARE

## 2021-07-09 VITALS
WEIGHT: 204.25 LBS | OXYGEN SATURATION: 99 % | DIASTOLIC BLOOD PRESSURE: 91 MMHG | HEART RATE: 64 BPM | BODY MASS INDEX: 30.16 KG/M2 | SYSTOLIC BLOOD PRESSURE: 124 MMHG | RESPIRATION RATE: 18 BRPM | TEMPERATURE: 98 F

## 2021-07-09 DIAGNOSIS — B96.89 ACUTE BACTERIAL SINUSITIS: ICD-10-CM

## 2021-07-09 DIAGNOSIS — I10 ESSENTIAL HYPERTENSION: ICD-10-CM

## 2021-07-09 DIAGNOSIS — J02.9 PHARYNGITIS, UNSPECIFIED ETIOLOGY: ICD-10-CM

## 2021-07-09 DIAGNOSIS — R05.9 COUGH: Primary | ICD-10-CM

## 2021-07-09 DIAGNOSIS — J01.90 ACUTE BACTERIAL SINUSITIS: ICD-10-CM

## 2021-07-09 DIAGNOSIS — Z20.822 SUSPECTED COVID-19 VIRUS INFECTION: ICD-10-CM

## 2021-07-09 DIAGNOSIS — R51.9 SINUS HEADACHE: ICD-10-CM

## 2021-07-09 LAB
CTP QC/QA: YES
CTP QC/QA: YES
MOLECULAR STREP A: NEGATIVE
SARS-COV-2 RDRP RESP QL NAA+PROBE: NEGATIVE

## 2021-07-09 PROCEDURE — U0002: ICD-10-PCS | Mod: QW,S$GLB,, | Performed by: NURSE PRACTITIONER

## 2021-07-09 PROCEDURE — 99999 PR PBB SHADOW E&M-EST. PATIENT-LVL IV: ICD-10-PCS | Mod: PBBFAC,,, | Performed by: NURSE PRACTITIONER

## 2021-07-09 PROCEDURE — 99214 PR OFFICE/OUTPT VISIT, EST, LEVL IV, 30-39 MIN: ICD-10-PCS | Mod: S$GLB,,, | Performed by: NURSE PRACTITIONER

## 2021-07-09 PROCEDURE — 1125F PR PAIN SEVERITY QUANTIFIED, PAIN PRESENT: ICD-10-PCS | Mod: S$GLB,,, | Performed by: NURSE PRACTITIONER

## 2021-07-09 PROCEDURE — 3008F BODY MASS INDEX DOCD: CPT | Mod: S$GLB,,, | Performed by: NURSE PRACTITIONER

## 2021-07-09 PROCEDURE — 1101F PT FALLS ASSESS-DOCD LE1/YR: CPT | Mod: S$GLB,,, | Performed by: NURSE PRACTITIONER

## 2021-07-09 PROCEDURE — 87651 STREP A DNA AMP PROBE: CPT | Mod: QW,S$GLB,, | Performed by: NURSE PRACTITIONER

## 2021-07-09 PROCEDURE — 3288F FALL RISK ASSESSMENT DOCD: CPT | Mod: S$GLB,,, | Performed by: NURSE PRACTITIONER

## 2021-07-09 PROCEDURE — 1159F MED LIST DOCD IN RCRD: CPT | Mod: S$GLB,,, | Performed by: NURSE PRACTITIONER

## 2021-07-09 PROCEDURE — 87651 POCT STREP A MOLECULAR: ICD-10-PCS | Mod: QW,S$GLB,, | Performed by: NURSE PRACTITIONER

## 2021-07-09 PROCEDURE — 3288F PR FALLS RISK ASSESSMENT DOCUMENTED: ICD-10-PCS | Mod: S$GLB,,, | Performed by: NURSE PRACTITIONER

## 2021-07-09 PROCEDURE — 1159F PR MEDICATION LIST DOCUMENTED IN MEDICAL RECORD: ICD-10-PCS | Mod: S$GLB,,, | Performed by: NURSE PRACTITIONER

## 2021-07-09 PROCEDURE — 1101F PR PT FALLS ASSESS DOC 0-1 FALLS W/OUT INJ PAST YR: ICD-10-PCS | Mod: S$GLB,,, | Performed by: NURSE PRACTITIONER

## 2021-07-09 PROCEDURE — 1125F AMNT PAIN NOTED PAIN PRSNT: CPT | Mod: S$GLB,,, | Performed by: NURSE PRACTITIONER

## 2021-07-09 PROCEDURE — U0002 COVID-19 LAB TEST NON-CDC: HCPCS | Mod: QW,S$GLB,, | Performed by: NURSE PRACTITIONER

## 2021-07-09 PROCEDURE — 99214 OFFICE O/P EST MOD 30 MIN: CPT | Mod: S$GLB,,, | Performed by: NURSE PRACTITIONER

## 2021-07-09 PROCEDURE — 99999 PR PBB SHADOW E&M-EST. PATIENT-LVL IV: CPT | Mod: PBBFAC,,, | Performed by: NURSE PRACTITIONER

## 2021-07-09 PROCEDURE — 3008F PR BODY MASS INDEX (BMI) DOCUMENTED: ICD-10-PCS | Mod: S$GLB,,, | Performed by: NURSE PRACTITIONER

## 2021-07-09 RX ORDER — FLUTICASONE PROPIONATE 50 MCG
2 SPRAY, SUSPENSION (ML) NASAL DAILY
Qty: 16 G | Refills: 1 | Status: SHIPPED | OUTPATIENT
Start: 2021-07-09 | End: 2022-02-01

## 2021-07-09 RX ORDER — AZITHROMYCIN 250 MG/1
TABLET, FILM COATED ORAL
Qty: 6 TABLET | Refills: 0 | Status: SHIPPED | OUTPATIENT
Start: 2021-07-09 | End: 2021-07-14

## 2021-07-09 RX ORDER — PROMETHAZINE HYDROCHLORIDE AND DEXTROMETHORPHAN HYDROBROMIDE 6.25; 15 MG/5ML; MG/5ML
5 SYRUP ORAL
Qty: 120 ML | Refills: 0 | Status: SHIPPED | OUTPATIENT
Start: 2021-07-09 | End: 2022-02-01

## 2021-07-23 ENCOUNTER — HOSPITAL ENCOUNTER (OUTPATIENT)
Dept: CARDIOLOGY | Facility: HOSPITAL | Age: 71
Discharge: HOME | End: 2021-07-23
Attending: INTERNAL MEDICINE
Payer: MEDICARE

## 2021-07-23 VITALS
HEIGHT: 69 IN | BODY MASS INDEX: 30.21 KG/M2 | DIASTOLIC BLOOD PRESSURE: 91 MMHG | WEIGHT: 204 LBS | HEART RATE: 78 BPM | SYSTOLIC BLOOD PRESSURE: 124 MMHG

## 2021-07-23 DIAGNOSIS — I51.7 LVH (LEFT VENTRICULAR HYPERTROPHY): ICD-10-CM

## 2021-07-23 DIAGNOSIS — I10 ESSENTIAL HYPERTENSION: ICD-10-CM

## 2021-07-23 DIAGNOSIS — I42.9 CARDIOMYOPATHY, UNSPECIFIED TYPE: ICD-10-CM

## 2021-07-23 DIAGNOSIS — I42.8 NON-ISCHEMIC CARDIOMYOPATHY: ICD-10-CM

## 2021-07-23 DIAGNOSIS — R94.31 ABNORMAL ECG: ICD-10-CM

## 2021-07-23 DIAGNOSIS — I48.20 ATRIAL FIBRILLATION, CHRONIC: ICD-10-CM

## 2021-07-23 LAB
ASCENDING AORTA: 3.59 CM
AV INDEX (PROSTH): 0.57
AV MEAN GRADIENT: 5 MMHG
AV PEAK GRADIENT: 7 MMHG
AV VALVE AREA: 2.18 CM2
AV VELOCITY RATIO: 0.64
BSA FOR ECHO PROCEDURE: 2.12 M2
CV ECHO LV RWT: 0.57 CM
DOP CALC AO PEAK VEL: 1.32 M/S
DOP CALC AO VTI: 25.84 CM
DOP CALC LVOT AREA: 3.8 CM2
DOP CALC LVOT DIAMETER: 2.2 CM
DOP CALC LVOT PEAK VEL: 0.85 M/S
DOP CALC LVOT STROKE VOLUME: 56.35 CM3
DOP CALC RVOT PEAK VEL: 0.59 M/S
DOP CALC RVOT VTI: 10.75 CM
DOP CALCLVOT PEAK VEL VTI: 14.83 CM
E/E' RATIO: 7.91 M/S
ECHO LV POSTERIOR WALL: 1.24 CM (ref 0.6–1.1)
EJECTION FRACTION: 35 %
FRACTIONAL SHORTENING: 27 % (ref 28–44)
INTERVENTRICULAR SEPTUM: 1.07 CM (ref 0.6–1.1)
IVRT: 78.02 MSEC
LA MAJOR: 5.63 CM
LA MINOR: 5.59 CM
LA WIDTH: 3.73 CM
LEFT ATRIUM SIZE: 4.47 CM
LEFT ATRIUM VOLUME INDEX: 38.2 ML/M2
LEFT ATRIUM VOLUME: 79.5 CM3
LEFT INTERNAL DIMENSION IN SYSTOLE: 3.2 CM (ref 2.1–4)
LEFT VENTRICLE DIASTOLIC VOLUME INDEX: 41.24 ML/M2
LEFT VENTRICLE DIASTOLIC VOLUME: 85.77 ML
LEFT VENTRICLE MASS INDEX: 86 G/M2
LEFT VENTRICLE SYSTOLIC VOLUME INDEX: 19.7 ML/M2
LEFT VENTRICLE SYSTOLIC VOLUME: 40.92 ML
LEFT VENTRICULAR INTERNAL DIMENSION IN DIASTOLE: 4.36 CM (ref 3.5–6)
LEFT VENTRICULAR MASS: 178.53 G
LV LATERAL E/E' RATIO: 6.07 M/S
LV SEPTAL E/E' RATIO: 11.38 M/S
MV PEAK E VEL: 0.91 M/S
PISA TR MAX VEL: 2.6 M/S
PV MEAN GRADIENT: 1 MMHG
PV PEAK VELOCITY: 1.15 CM/S
RA MAJOR: 5.16 CM
RA PRESSURE: 3 MMHG
RA WIDTH: 4.22 CM
RIGHT VENTRICULAR END-DIASTOLIC DIMENSION: 3.77 CM
SINUS: 3 CM
STJ: 2.65 CM
TDI LATERAL: 0.15 M/S
TDI SEPTAL: 0.08 M/S
TDI: 0.12 M/S
TR MAX PG: 27 MMHG
TV REST PULMONARY ARTERY PRESSURE: 30 MMHG

## 2021-07-23 PROCEDURE — 93306 TTE W/DOPPLER COMPLETE: CPT | Mod: 26,,, | Performed by: INTERNAL MEDICINE

## 2021-07-23 PROCEDURE — 93306 ECHO (CUPID ONLY): ICD-10-PCS | Mod: 26,,, | Performed by: INTERNAL MEDICINE

## 2021-07-23 PROCEDURE — 93306 TTE W/DOPPLER COMPLETE: CPT

## 2021-07-29 PROBLEM — I50.42 CHRONIC COMBINED SYSTOLIC AND DIASTOLIC CONGESTIVE HEART FAILURE: Status: ACTIVE | Noted: 2021-07-29

## 2021-07-30 ENCOUNTER — OFFICE VISIT (OUTPATIENT)
Dept: CARDIOLOGY | Facility: CLINIC | Age: 71
End: 2021-07-30
Payer: MEDICARE

## 2021-07-30 VITALS
DIASTOLIC BLOOD PRESSURE: 84 MMHG | HEIGHT: 69 IN | HEART RATE: 75 BPM | OXYGEN SATURATION: 98 % | SYSTOLIC BLOOD PRESSURE: 112 MMHG | BODY MASS INDEX: 30.04 KG/M2 | RESPIRATION RATE: 16 BRPM | WEIGHT: 202.81 LBS

## 2021-07-30 DIAGNOSIS — I50.42 CHRONIC COMBINED SYSTOLIC AND DIASTOLIC CONGESTIVE HEART FAILURE: Primary | ICD-10-CM

## 2021-07-30 DIAGNOSIS — I51.7 LVH (LEFT VENTRICULAR HYPERTROPHY): ICD-10-CM

## 2021-07-30 DIAGNOSIS — R94.31 ABNORMAL ECG: ICD-10-CM

## 2021-07-30 DIAGNOSIS — I42.8 NON-ISCHEMIC CARDIOMYOPATHY: ICD-10-CM

## 2021-07-30 DIAGNOSIS — E78.5 DYSLIPIDEMIA: ICD-10-CM

## 2021-07-30 DIAGNOSIS — E78.6 LOW HDL (UNDER 40): ICD-10-CM

## 2021-07-30 DIAGNOSIS — I51.7 LAE (LEFT ATRIAL ENLARGEMENT): ICD-10-CM

## 2021-07-30 DIAGNOSIS — E66.3 OVERWEIGHT (BMI 25.0-29.9): ICD-10-CM

## 2021-07-30 DIAGNOSIS — I48.20 ATRIAL FIBRILLATION, CHRONIC: ICD-10-CM

## 2021-07-30 DIAGNOSIS — I10 ESSENTIAL HYPERTENSION: ICD-10-CM

## 2021-07-30 DIAGNOSIS — I42.9 CARDIOMYOPATHY, UNSPECIFIED TYPE: ICD-10-CM

## 2021-07-30 PROCEDURE — 1101F PT FALLS ASSESS-DOCD LE1/YR: CPT | Mod: S$GLB,,, | Performed by: INTERNAL MEDICINE

## 2021-07-30 PROCEDURE — 1126F AMNT PAIN NOTED NONE PRSNT: CPT | Mod: S$GLB,,, | Performed by: INTERNAL MEDICINE

## 2021-07-30 PROCEDURE — 1126F PR PAIN SEVERITY QUANTIFIED, NO PAIN PRESENT: ICD-10-PCS | Mod: S$GLB,,, | Performed by: INTERNAL MEDICINE

## 2021-07-30 PROCEDURE — 1101F PR PT FALLS ASSESS DOC 0-1 FALLS W/OUT INJ PAST YR: ICD-10-PCS | Mod: S$GLB,,, | Performed by: INTERNAL MEDICINE

## 2021-07-30 PROCEDURE — 99999 PR PBB SHADOW E&M-EST. PATIENT-LVL IV: ICD-10-PCS | Mod: PBBFAC,,, | Performed by: INTERNAL MEDICINE

## 2021-07-30 PROCEDURE — 99999 PR PBB SHADOW E&M-EST. PATIENT-LVL IV: CPT | Mod: PBBFAC,,, | Performed by: INTERNAL MEDICINE

## 2021-07-30 PROCEDURE — 3008F PR BODY MASS INDEX (BMI) DOCUMENTED: ICD-10-PCS | Mod: S$GLB,,, | Performed by: INTERNAL MEDICINE

## 2021-07-30 PROCEDURE — 3008F BODY MASS INDEX DOCD: CPT | Mod: S$GLB,,, | Performed by: INTERNAL MEDICINE

## 2021-07-30 PROCEDURE — 1160F RVW MEDS BY RX/DR IN RCRD: CPT | Mod: S$GLB,,, | Performed by: INTERNAL MEDICINE

## 2021-07-30 PROCEDURE — 3074F SYST BP LT 130 MM HG: CPT | Mod: S$GLB,,, | Performed by: INTERNAL MEDICINE

## 2021-07-30 PROCEDURE — 3079F PR MOST RECENT DIASTOLIC BLOOD PRESSURE 80-89 MM HG: ICD-10-PCS | Mod: S$GLB,,, | Performed by: INTERNAL MEDICINE

## 2021-07-30 PROCEDURE — 3074F PR MOST RECENT SYSTOLIC BLOOD PRESSURE < 130 MM HG: ICD-10-PCS | Mod: S$GLB,,, | Performed by: INTERNAL MEDICINE

## 2021-07-30 PROCEDURE — 3288F FALL RISK ASSESSMENT DOCD: CPT | Mod: S$GLB,,, | Performed by: INTERNAL MEDICINE

## 2021-07-30 PROCEDURE — 1159F PR MEDICATION LIST DOCUMENTED IN MEDICAL RECORD: ICD-10-PCS | Mod: S$GLB,,, | Performed by: INTERNAL MEDICINE

## 2021-07-30 PROCEDURE — 1159F MED LIST DOCD IN RCRD: CPT | Mod: S$GLB,,, | Performed by: INTERNAL MEDICINE

## 2021-07-30 PROCEDURE — 99214 PR OFFICE/OUTPT VISIT, EST, LEVL IV, 30-39 MIN: ICD-10-PCS | Mod: 25,S$GLB,, | Performed by: INTERNAL MEDICINE

## 2021-07-30 PROCEDURE — 3079F DIAST BP 80-89 MM HG: CPT | Mod: S$GLB,,, | Performed by: INTERNAL MEDICINE

## 2021-07-30 PROCEDURE — 1160F PR REVIEW ALL MEDS BY PRESCRIBER/CLIN PHARMACIST DOCUMENTED: ICD-10-PCS | Mod: S$GLB,,, | Performed by: INTERNAL MEDICINE

## 2021-07-30 PROCEDURE — 99214 OFFICE O/P EST MOD 30 MIN: CPT | Mod: 25,S$GLB,, | Performed by: INTERNAL MEDICINE

## 2021-07-30 PROCEDURE — 3288F PR FALLS RISK ASSESSMENT DOCUMENTED: ICD-10-PCS | Mod: S$GLB,,, | Performed by: INTERNAL MEDICINE

## 2022-01-11 DIAGNOSIS — E03.4 HYPOTHYROIDISM DUE TO ACQUIRED ATROPHY OF THYROID: ICD-10-CM

## 2022-01-11 DIAGNOSIS — E78.5 DYSLIPIDEMIA: ICD-10-CM

## 2022-01-11 NOTE — TELEPHONE ENCOUNTER
No new care gaps identified.  Powered by [employer]. Reference number: [id number].   1/11/2022 4:18:24 PM CST

## 2022-01-11 NOTE — TELEPHONE ENCOUNTER
----- Message from Rebecca Hunters sent at 1/11/2022  4:12 PM CST -----  Contact: Patient  Type:  RX Refill Request    Who Called: Patient    Refill or New Rx: refill    RX Name and Strength: levothyroxine (SYNTHROID) 75 MCG tablet; rosuvastatin (CRESTOR) 10 MG tablet    How is the patient currently taking it? (ex. 1XDay)    Is this a 30 day or 90 day RX:    Preferred Pharmacy with phone number:   [facility] [address] [address]  Phone: [phone] Fax: [phone]      Local or Mail Order:    Ordering Provider:    Would the patient rather a call back or a response via [facility]?    Best Call Back Number: [phone]    Additional Information: aso wants to know if antibiotics could be sent for cold symptoms    [facility] [facility] in [place], [place].   Phone: [phone]

## 2022-01-12 ENCOUNTER — IMMUNIZATION (OUTPATIENT)
Dept: PRIMARY CARE CLINIC | Facility: CLINIC | Age: 72
End: 2022-01-12
Payer: MEDICARE

## 2022-01-12 DIAGNOSIS — Z23 NEED FOR VACCINATION: Primary | ICD-10-CM

## 2022-01-12 PROCEDURE — 0004A COVID-19, MRNA, LNP-S, PF, 30 MCG/0.3 ML DOSE VACCINE: CPT | Mod: CV19,PBBFAC | Performed by: FAMILY MEDICINE

## 2022-01-12 RX ORDER — ROSUVASTATIN CALCIUM 10 MG/1
10 TABLET, COATED ORAL NIGHTLY
Qty: 90 TABLET | Refills: 0 | Status: SHIPPED | OUTPATIENT
Start: 2022-01-12 | End: 2022-07-18

## 2022-01-12 RX ORDER — LEVOTHYROXINE SODIUM 75 UG/1
75 TABLET ORAL DAILY
Qty: 90 TABLET | Refills: 0 | Status: SHIPPED | OUTPATIENT
Start: 2022-01-12 | End: 2022-02-02

## 2022-01-25 ENCOUNTER — LAB VISIT (OUTPATIENT)
Dept: PRIMARY CARE CLINIC | Facility: OTHER | Age: 72
End: 2022-01-25
Attending: INTERNAL MEDICINE
Payer: MEDICARE

## 2022-01-25 DIAGNOSIS — Z20.822 ENCOUNTER FOR LABORATORY TESTING FOR COVID-19 VIRUS: ICD-10-CM

## 2022-01-25 PROCEDURE — U0003 INFECTIOUS AGENT DETECTION BY NUCLEIC ACID (DNA OR RNA); SEVERE ACUTE RESPIRATORY SYNDROME CORONAVIRUS 2 (SARS-COV-2) (CORONAVIRUS DISEASE [COVID-19]), AMPLIFIED PROBE TECHNIQUE, MAKING USE OF HIGH THROUGHPUT TECHNOLOGIES AS DESCRIBED BY CMS-2020-01-R: HCPCS | Performed by: INTERNAL MEDICINE

## 2022-01-26 LAB
SARS-COV-2 RNA RESP QL NAA+PROBE: NOT DETECTED
SARS-COV-2- CYCLE NUMBER: NORMAL

## 2022-02-01 ENCOUNTER — OFFICE VISIT (OUTPATIENT)
Dept: INTERNAL MEDICINE | Facility: CLINIC | Age: 72
End: 2022-02-01
Payer: MEDICARE

## 2022-02-01 VITALS
WEIGHT: 195.13 LBS | HEIGHT: 69 IN | BODY MASS INDEX: 28.9 KG/M2 | HEART RATE: 76 BPM | SYSTOLIC BLOOD PRESSURE: 120 MMHG | OXYGEN SATURATION: 97 % | DIASTOLIC BLOOD PRESSURE: 76 MMHG | TEMPERATURE: 98 F

## 2022-02-01 DIAGNOSIS — I10 ESSENTIAL HYPERTENSION: ICD-10-CM

## 2022-02-01 DIAGNOSIS — E53.8 B12 DEFICIENCY: ICD-10-CM

## 2022-02-01 DIAGNOSIS — Z00.00 ANNUAL PHYSICAL EXAM: Primary | ICD-10-CM

## 2022-02-01 DIAGNOSIS — I42.9 CARDIOMYOPATHY, UNSPECIFIED TYPE: ICD-10-CM

## 2022-02-01 DIAGNOSIS — Z28.39 IMMUNIZATION DEFICIENCY: ICD-10-CM

## 2022-02-01 DIAGNOSIS — H91.90 HEARING LOSS, UNSPECIFIED HEARING LOSS TYPE, UNSPECIFIED LATERALITY: ICD-10-CM

## 2022-02-01 DIAGNOSIS — Z12.5 SCREENING FOR PROSTATE CANCER: ICD-10-CM

## 2022-02-01 DIAGNOSIS — E03.9 HYPOTHYROIDISM, UNSPECIFIED TYPE: ICD-10-CM

## 2022-02-01 DIAGNOSIS — L84 CORN OF FOOT: ICD-10-CM

## 2022-02-01 DIAGNOSIS — R25.1 TREMOR: ICD-10-CM

## 2022-02-01 PROCEDURE — 99214 PR OFFICE/OUTPT VISIT, EST, LEVL IV, 30-39 MIN: ICD-10-PCS | Mod: S$GLB,,, | Performed by: FAMILY MEDICINE

## 2022-02-01 PROCEDURE — 1101F PT FALLS ASSESS-DOCD LE1/YR: CPT | Mod: CPTII,S$GLB,, | Performed by: FAMILY MEDICINE

## 2022-02-01 PROCEDURE — 3288F PR FALLS RISK ASSESSMENT DOCUMENTED: ICD-10-PCS | Mod: CPTII,S$GLB,, | Performed by: FAMILY MEDICINE

## 2022-02-01 PROCEDURE — 3078F PR MOST RECENT DIASTOLIC BLOOD PRESSURE < 80 MM HG: ICD-10-PCS | Mod: CPTII,S$GLB,, | Performed by: FAMILY MEDICINE

## 2022-02-01 PROCEDURE — 1126F PR PAIN SEVERITY QUANTIFIED, NO PAIN PRESENT: ICD-10-PCS | Mod: CPTII,S$GLB,, | Performed by: FAMILY MEDICINE

## 2022-02-01 PROCEDURE — 3074F PR MOST RECENT SYSTOLIC BLOOD PRESSURE < 130 MM HG: ICD-10-PCS | Mod: CPTII,S$GLB,, | Performed by: FAMILY MEDICINE

## 2022-02-01 PROCEDURE — 1101F PR PT FALLS ASSESS DOC 0-1 FALLS W/OUT INJ PAST YR: ICD-10-PCS | Mod: CPTII,S$GLB,, | Performed by: FAMILY MEDICINE

## 2022-02-01 PROCEDURE — 3008F PR BODY MASS INDEX (BMI) DOCUMENTED: ICD-10-PCS | Mod: CPTII,S$GLB,, | Performed by: FAMILY MEDICINE

## 2022-02-01 PROCEDURE — 1126F AMNT PAIN NOTED NONE PRSNT: CPT | Mod: CPTII,S$GLB,, | Performed by: FAMILY MEDICINE

## 2022-02-01 PROCEDURE — 3008F BODY MASS INDEX DOCD: CPT | Mod: CPTII,S$GLB,, | Performed by: FAMILY MEDICINE

## 2022-02-01 PROCEDURE — 99214 OFFICE O/P EST MOD 30 MIN: CPT | Mod: S$GLB,,, | Performed by: FAMILY MEDICINE

## 2022-02-01 PROCEDURE — 1159F PR MEDICATION LIST DOCUMENTED IN MEDICAL RECORD: ICD-10-PCS | Mod: CPTII,S$GLB,, | Performed by: FAMILY MEDICINE

## 2022-02-01 PROCEDURE — 3074F SYST BP LT 130 MM HG: CPT | Mod: CPTII,S$GLB,, | Performed by: FAMILY MEDICINE

## 2022-02-01 PROCEDURE — 3078F DIAST BP <80 MM HG: CPT | Mod: CPTII,S$GLB,, | Performed by: FAMILY MEDICINE

## 2022-02-01 PROCEDURE — 3288F FALL RISK ASSESSMENT DOCD: CPT | Mod: CPTII,S$GLB,, | Performed by: FAMILY MEDICINE

## 2022-02-01 PROCEDURE — 1159F MED LIST DOCD IN RCRD: CPT | Mod: CPTII,S$GLB,, | Performed by: FAMILY MEDICINE

## 2022-02-01 PROCEDURE — 99999 PR PBB SHADOW E&M-EST. PATIENT-LVL IV: CPT | Mod: PBBFAC,,, | Performed by: FAMILY MEDICINE

## 2022-02-01 PROCEDURE — 99999 PR PBB SHADOW E&M-EST. PATIENT-LVL IV: ICD-10-PCS | Mod: PBBFAC,,, | Performed by: FAMILY MEDICINE

## 2022-02-01 NOTE — PROGRESS NOTES
Subjective:       Patient ID: Ezio James is a 71 y.o. male.    Chief Complaint: Follow-up and Annual Exam    Annual exam.  Medical history includes hypothyroidism atrial fibrillation chronic cardiomegaly hypertension hyperlipidemia LVH tremor B12 deficiency.  He is followed by cardiology.  He received a Tdap last week.  He denies headache chest pain palpitations shortness of breath or edema.  He reports hearing loss and would like urine evaluation.  Has progressive tremor the right hand and would like neurology evaluation.    Review of Systems   Constitutional: Negative for activity change, appetite change, fatigue and unexpected weight change.   HENT: Positive for hearing loss. Negative for congestion, ear pain, sneezing, sore throat, tinnitus and trouble swallowing.    Eyes: Negative for pain, redness and visual disturbance.   Respiratory: Negative for cough, chest tightness, shortness of breath and wheezing.    Cardiovascular: Negative for chest pain, palpitations and leg swelling.   Gastrointestinal: Negative for abdominal distention, abdominal pain, blood in stool, constipation, diarrhea, nausea, rectal pain and vomiting.   Endocrine: Negative for polydipsia and polyuria.   Genitourinary: Negative for difficulty urinating, dysuria, frequency, hematuria and urgency.   Musculoskeletal: Negative for arthralgias, back pain, joint swelling, myalgias, neck pain and neck stiffness.   Skin: Negative for rash and wound.   Neurological: Positive for tremors. Negative for dizziness, syncope, light-headedness, numbness and headaches.   Hematological: Negative for adenopathy. Does not bruise/bleed easily.   Psychiatric/Behavioral: Negative for agitation, confusion, dysphoric mood and sleep disturbance. The patient is not nervous/anxious.        Objective:      Physical Exam  Constitutional:       General: He is not in acute distress.     Appearance: Normal appearance. He is well-developed and well-nourished. He is not  ill-appearing or diaphoretic.   HENT:      Right Ear: External ear normal.      Left Ear: External ear normal.      Nose: Nose normal.      Mouth/Throat:      Mouth: Oropharynx is clear and moist.   Eyes:      Extraocular Movements: EOM normal.      Conjunctiva/sclera: Conjunctivae normal.      Pupils: Pupils are equal, round, and reactive to light.      Comments: 1 Mm mucous cyst right inner canthus.   Neck:      Thyroid: No thyromegaly.      Vascular: No JVD.      Comments: Normal thyroid  Cardiovascular:      Rate and Rhythm: Normal rate. Rhythm irregular.      Pulses: Intact distal pulses.      Heart sounds: Normal heart sounds. No murmur heard.  No friction rub. No gallop.    Pulmonary:      Effort: Pulmonary effort is normal. No respiratory distress.      Breath sounds: Normal breath sounds. No wheezing, rhonchi or rales.   Abdominal:      General: Bowel sounds are normal. There is no distension.      Palpations: Abdomen is soft. There is no mass.      Tenderness: There is no abdominal tenderness.   Musculoskeletal:         General: No tenderness or edema. Normal range of motion.      Cervical back: Normal range of motion and neck supple. No tenderness.   Lymphadenopathy:      Cervical: No cervical adenopathy.   Skin:     General: Skin is warm and dry.      Coloration: Skin is not pale.      Findings: No erythema or rash.      Comments: Small corn left plantar foot 3rd metatarsal area.  Small callus right foot 1st metatarsal area   Neurological:      Mental Status: He is alert and oriented to person, place, and time.      Cranial Nerves: No cranial nerve deficit.      Motor: No abnormal muscle tone.      Coordination: Coordination normal.      Deep Tendon Reflexes: Reflexes are normal and symmetric. Reflexes normal.      Comments: Intentional tremor right hand   Psychiatric:         Mood and Affect: Mood and affect normal.         Behavior: Behavior normal.         Thought Content: Thought content normal.          Judgment: Judgment normal.         Lab Visit on 01/25/2022   Component Date Value Ref Range Status    SARS-CoV2 (COVID-19) Qualitative P* 01/25/2022 Not Detected  Not Detected Final    SARS-COV-2- Cycle Number 01/25/2022 N/A   Final     Assessment:       1. Annual physical exam    2. Cardiomyopathy, unspecified type    3. Essential hypertension    4. B12 deficiency    5. Hypothyroidism, unspecified type    6. Hearing loss, unspecified hearing loss type, unspecified laterality    7. Screening for prostate cancer    8. Tremor    9. Corn of foot    10. Immunization deficiency        Plan:     Blood pressure controlled lab was ordered previous PSAs were all normal.  It is too early to repeat PSA  and he would not be interested in doing at this time.  ENT referral for hearing loss.  In neurology referral for tremor.  Corn pad for corn.  Moisturizing cream for callus.  Follow-up in 1 year.  Discussed need for Shingrix he will get his pharmacy    Annual physical exam    Cardiomyopathy, unspecified type    Essential hypertension  -     CBC Auto Differential; Future; Expected date: 02/01/2022  -     Urinalysis; Future; Expected date: 02/01/2022  -     Comprehensive Metabolic Panel; Future; Expected date: 02/01/2022  -     Lipid Panel; Future; Expected date: 02/01/2022    B12 deficiency  -     Vitamin B12; Future; Expected date: 02/01/2022    Hypothyroidism, unspecified type  -     TSH; Future; Expected date: 02/01/2022  -     T4, Free; Future; Expected date: 02/01/2022    Hearing loss, unspecified hearing loss type, unspecified laterality  -     Ambulatory referral/consult to ENT; Future; Expected date: 02/08/2022    Screening for prostate cancer    Tremor  -     Ambulatory referral/consult to Neurology; Future; Expected date: 02/08/2022    [place] of foot    Immunization deficiency

## 2022-02-02 ENCOUNTER — OFFICE VISIT (OUTPATIENT)
Dept: OTOLARYNGOLOGY | Facility: CLINIC | Age: 72
End: 2022-02-02
Payer: MEDICARE

## 2022-02-02 ENCOUNTER — CLINICAL SUPPORT (OUTPATIENT)
Dept: AUDIOLOGY | Facility: CLINIC | Age: 72
End: 2022-02-02
Payer: MEDICARE

## 2022-02-02 VITALS — BODY MASS INDEX: 28.69 KG/M2 | WEIGHT: 200.38 LBS | HEIGHT: 70 IN

## 2022-02-02 DIAGNOSIS — H90.3 SENSORINEURAL HEARING LOSS, BILATERAL: Primary | ICD-10-CM

## 2022-02-02 DIAGNOSIS — H91.90 HEARING LOSS, UNSPECIFIED HEARING LOSS TYPE, UNSPECIFIED LATERALITY: ICD-10-CM

## 2022-02-02 DIAGNOSIS — E03.9 HYPOTHYROIDISM, UNSPECIFIED TYPE: Primary | ICD-10-CM

## 2022-02-02 PROCEDURE — 3288F PR FALLS RISK ASSESSMENT DOCUMENTED: ICD-10-PCS | Mod: CPTII,S$GLB,, | Performed by: OTOLARYNGOLOGY

## 2022-02-02 PROCEDURE — 1126F AMNT PAIN NOTED NONE PRSNT: CPT | Mod: CPTII,S$GLB,, | Performed by: OTOLARYNGOLOGY

## 2022-02-02 PROCEDURE — 1101F PT FALLS ASSESS-DOCD LE1/YR: CPT | Mod: CPTII,S$GLB,, | Performed by: OTOLARYNGOLOGY

## 2022-02-02 PROCEDURE — 92557 COMPREHENSIVE HEARING TEST: CPT | Mod: S$GLB,,, | Performed by: AUDIOLOGIST-HEARING AID FITTER

## 2022-02-02 PROCEDURE — 99999 PR PBB SHADOW E&M-EST. PATIENT-LVL I: ICD-10-PCS | Mod: PBBFAC,,, | Performed by: AUDIOLOGIST-HEARING AID FITTER

## 2022-02-02 PROCEDURE — 1159F PR MEDICATION LIST DOCUMENTED IN MEDICAL RECORD: ICD-10-PCS | Mod: CPTII,S$GLB,, | Performed by: OTOLARYNGOLOGY

## 2022-02-02 PROCEDURE — 1126F PR PAIN SEVERITY QUANTIFIED, NO PAIN PRESENT: ICD-10-PCS | Mod: CPTII,S$GLB,, | Performed by: OTOLARYNGOLOGY

## 2022-02-02 PROCEDURE — 92567 TYMPANOMETRY: CPT | Mod: S$GLB,,, | Performed by: AUDIOLOGIST-HEARING AID FITTER

## 2022-02-02 PROCEDURE — 1159F MED LIST DOCD IN RCRD: CPT | Mod: CPTII,S$GLB,, | Performed by: OTOLARYNGOLOGY

## 2022-02-02 PROCEDURE — 92567 PR TYMPA2METRY: ICD-10-PCS | Mod: S$GLB,,, | Performed by: AUDIOLOGIST-HEARING AID FITTER

## 2022-02-02 PROCEDURE — 99999 PR PBB SHADOW E&M-EST. PATIENT-LVL III: ICD-10-PCS | Mod: PBBFAC,,, | Performed by: OTOLARYNGOLOGY

## 2022-02-02 PROCEDURE — 1101F PR PT FALLS ASSESS DOC 0-1 FALLS W/OUT INJ PAST YR: ICD-10-PCS | Mod: CPTII,S$GLB,, | Performed by: OTOLARYNGOLOGY

## 2022-02-02 PROCEDURE — 3008F PR BODY MASS INDEX (BMI) DOCUMENTED: ICD-10-PCS | Mod: CPTII,S$GLB,, | Performed by: OTOLARYNGOLOGY

## 2022-02-02 PROCEDURE — 3288F FALL RISK ASSESSMENT DOCD: CPT | Mod: CPTII,S$GLB,, | Performed by: OTOLARYNGOLOGY

## 2022-02-02 PROCEDURE — 99203 PR OFFICE/OUTPT VISIT, NEW, LEVL III, 30-44 MIN: ICD-10-PCS | Mod: S$GLB,,, | Performed by: OTOLARYNGOLOGY

## 2022-02-02 PROCEDURE — 3008F BODY MASS INDEX DOCD: CPT | Mod: CPTII,S$GLB,, | Performed by: OTOLARYNGOLOGY

## 2022-02-02 PROCEDURE — 99999 PR PBB SHADOW E&M-EST. PATIENT-LVL III: CPT | Mod: PBBFAC,,, | Performed by: OTOLARYNGOLOGY

## 2022-02-02 PROCEDURE — 99999 PR PBB SHADOW E&M-EST. PATIENT-LVL I: CPT | Mod: PBBFAC,,, | Performed by: AUDIOLOGIST-HEARING AID FITTER

## 2022-02-02 PROCEDURE — 92557 PR COMPREHENSIVE HEARING TEST: ICD-10-PCS | Mod: S$GLB,,, | Performed by: AUDIOLOGIST-HEARING AID FITTER

## 2022-02-02 PROCEDURE — 99203 OFFICE O/P NEW LOW 30 MIN: CPT | Mod: S$GLB,,, | Performed by: OTOLARYNGOLOGY

## 2022-02-02 RX ORDER — LEVOTHYROXINE SODIUM 88 UG/1
88 TABLET ORAL
Qty: 90 TABLET | Refills: 1 | Status: SHIPPED | OUTPATIENT
Start: 2022-02-02 | End: 2022-07-18 | Stop reason: SDUPTHER

## 2022-02-02 NOTE — PROGRESS NOTES
Referring provider: Dr. Rohini James was seen 02/02/2022 for an audiological evaluation.  Patient complains of hearing loss in the bilateral ear that started many years and and has progressively decreased. He has noted increased difficulty with speech clarity over the past two years, sounds like people are mumbling when wearing masks. 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 No tinnitus. No vertigo. No otalgia, aural fullness, pressure or drainage from ears.     Results reveal a borderline normal-to-severe sensorineural hearing loss 250-8000 Hz for the right ear, and a borderline normal-to-severe sensorineural hearing loss 250-8000 Hz for the left ear.   Speech Reception Thresholds were 30 dBHL for the right ear and 35 dBHL for the left ear.   Word recognition scores were fair for the right ear and fair for the left ear.   Tympanograms were Type Ad for the right ear and Type Ad for the left ear.    Patient was counseled on the above findings.    Recommendations:  1. ENT  2. Annual audiogram to monitor slight asymmetry in hearing.   3. Hearing aids, binaural. Patient is provided a copy of his audiogram and hearing aid information packet. He will first check with [facility] and insurance and return as needed.

## 2022-02-02 NOTE — PROGRESS NOTES
"Referring Provider:    Darren Nova Md  [address] [place] [address]  Subjective:   Patient: Ezio James [id number], :1950   Visit date:2022 8:48 AM    Chief Complaint: see below  HPI:       Hearing Loss    Prior notes reviewed  Clinical documentation obtained by nursing staff reviewed.      Patient complains of hearing loss in the bilateral ear that started many years and and has progressively decreased. He has noted increased difficulty with speech clarity over the past two years, sounds like people are mumbling when wearing masks. 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 No tinnitus. No vertigo. No otalgia, aural fullness, pressure or drainage from ears.       Objective:     Physical Exam:  Vitals:  Ht 5' 10" (1.778 m)   Wt 90.9 kg (200 lb 6.4 oz)   BMI 28.75 kg/m²   General appearance:  Well developed, well nourished    Ears:  Otoscopy of external auditory canals and tympanic membranes was normal, clinical speech reception thresholds grossly intact, no mass/lesion of auricle.    Nose:  No masses/lesions of external nose, nasal mucosa, septum, and turbinates were within normal limits.    Mouth:  No mass/lesion of lips, teeth, gums, hard/soft palate, tongue, tonsils, or oropharynx.    Neck & Lymphatics:  No cervical lymphadenopathy, no neck mass/crepitus/ asymmetry, trachea is midline, no thyroid enlargement/tenderness/mass.      Audiogram with [facility] AU        []  Data Reviewed:    Lab Results   Component Value Date    WBC 6.78 2022    HGB 14.7 2022    HCT 45.5 2022    MCV 99 (H) 2022    EOSINOPHIL 2.9 2022                 Assessment & Plan:   Sensorineural hearing loss, bilateral        Recommend hearing aid evaluation for both ears.     Thank you for allowing me to participate in the care of Ezio.       Tigre Starks MD, FACS  [facility]   [facility]  [address]  [place] [address]  P: [phone]  F: [phone]

## 2022-02-03 ENCOUNTER — HOSPITAL ENCOUNTER (OUTPATIENT)
Dept: CARDIOLOGY | Facility: HOSPITAL | Age: 72
Discharge: HOME | End: 2022-02-03
Attending: INTERNAL MEDICINE
Payer: MEDICARE

## 2022-02-03 VITALS
BODY MASS INDEX: 28.63 KG/M2 | HEIGHT: 70 IN | DIASTOLIC BLOOD PRESSURE: 76 MMHG | SYSTOLIC BLOOD PRESSURE: 120 MMHG | HEART RATE: 75 BPM | WEIGHT: 200 LBS

## 2022-02-03 DIAGNOSIS — I42.8 NON-ISCHEMIC CARDIOMYOPATHY: ICD-10-CM

## 2022-02-03 DIAGNOSIS — I42.9 CARDIOMYOPATHY, UNSPECIFIED TYPE: ICD-10-CM

## 2022-02-03 DIAGNOSIS — I51.7 LVH (LEFT VENTRICULAR HYPERTROPHY): ICD-10-CM

## 2022-02-03 DIAGNOSIS — I10 ESSENTIAL HYPERTENSION: ICD-10-CM

## 2022-02-03 DIAGNOSIS — I51.7 LAE (LEFT ATRIAL ENLARGEMENT): ICD-10-CM

## 2022-02-03 DIAGNOSIS — I50.42 CHRONIC COMBINED SYSTOLIC AND DIASTOLIC CONGESTIVE HEART FAILURE: ICD-10-CM

## 2022-02-03 DIAGNOSIS — I48.20 ATRIAL FIBRILLATION, CHRONIC: ICD-10-CM

## 2022-02-03 DIAGNOSIS — R94.31 ABNORMAL ECG: ICD-10-CM

## 2022-02-03 LAB
AORTIC ROOT ANNULUS: 2.48 CM
ASCENDING AORTA: 3.44 CM
AV INDEX (PROSTH): 0.5
AV MEAN GRADIENT: 4 MMHG
AV PEAK GRADIENT: 6 MMHG
AV VALVE AREA: 1.56 CM2
AV VELOCITY RATIO: 0.58
BSA FOR ECHO PROCEDURE: 2.12 M2
CV ECHO LV RWT: 0.49 CM
DOP CALC AO PEAK VEL: 1.27 M/S
DOP CALC AO VTI: 24.9 CM
DOP CALC LVOT AREA: 3.1 CM2
DOP CALC LVOT DIAMETER: 2 CM
DOP CALC LVOT PEAK VEL: 0.74 M/S
DOP CALC LVOT STROKE VOLUME: 38.94 CM3
DOP CALC RVOT PEAK VEL: 0.51 M/S
DOP CALC RVOT VTI: 10.8 CM
DOP CALCLVOT PEAK VEL VTI: 12.4 CM
E WAVE DECELERATION TIME: 166.4 MSEC
E/A RATIO: 4.21
ECHO EF ESTIMATED: 43 %
ECHO LV POSTERIOR WALL: 1.1 CM (ref 0.6–1.1)
EJECTION FRACTION: 45 %
FRACTIONAL SHORTENING: 21 % (ref 28–44)
INTERVENTRICULAR SEPTUM: 1.19 CM (ref 0.6–1.1)
IVC DIAMETER: 2.05 CM
IVRT: 105.61 MSEC
LA MAJOR: 6.74 CM
LA MINOR: 5.75 CM
LA WIDTH: 3.93 CM
LEFT ATRIUM SIZE: 4.53 CM
LEFT ATRIUM VOLUME INDEX MOD: 40.7 ML/M2
LEFT ATRIUM VOLUME INDEX: 44.9 ML/M2
LEFT ATRIUM VOLUME MOD: 84.98 CM3
LEFT ATRIUM VOLUME: 93.91 CM3
LEFT INTERNAL DIMENSION IN SYSTOLE: 3.57 CM (ref 2.1–4)
LEFT VENTRICLE DIASTOLIC VOLUME INDEX: 44.56 ML/M2
LEFT VENTRICLE DIASTOLIC VOLUME: 93.12 ML
LEFT VENTRICLE MASS INDEX: 89 G/M2
LEFT VENTRICLE SYSTOLIC VOLUME INDEX: 25.5 ML/M2
LEFT VENTRICLE SYSTOLIC VOLUME: 53.34 ML
LEFT VENTRICULAR INTERNAL DIMENSION IN DIASTOLE: 4.51 CM (ref 3.5–6)
LEFT VENTRICULAR MASS: 185.88 G
LV SEPTAL E/E' RATIO: 10 M/S
LVOT MG: 1.34 MMHG
LVOT MV: 0.55 CM/S
MV PEAK A VEL: 0.19 M/S
MV PEAK E VEL: 0.8 M/S
MV STENOSIS PRESSURE HALF TIME: 52.92 MS
MV VALVE AREA P 1/2 METHOD: 4.16 CM2
PISA TR MAX VEL: 2.68 M/S
PV MEAN GRADIENT: 0.6 MMHG
PV PEAK VELOCITY: 1.02 CM/S
RA MAJOR: 5.89 CM
RA PRESSURE: 8 MMHG
RA WIDTH: 4.35 CM
RIGHT VENTRICULAR END-DIASTOLIC DIMENSION: 3.98 CM
SINUS: 2.86 CM
STJ: 2.5 CM
TDI SEPTAL: 0.08 M/S
TR MAX PG: 29 MMHG
TV REST PULMONARY ARTERY PRESSURE: 37 MMHG

## 2022-02-03 PROCEDURE — 93306 TTE W/DOPPLER COMPLETE: CPT | Mod: 26,,, | Performed by: STUDENT IN AN ORGANIZED HEALTH CARE EDUCATION/TRAINING PROGRAM

## 2022-02-03 PROCEDURE — 93306 ECHO (CUPID ONLY): ICD-10-PCS | Mod: 26,,, | Performed by: STUDENT IN AN ORGANIZED HEALTH CARE EDUCATION/TRAINING PROGRAM

## 2022-02-03 PROCEDURE — 93306 TTE W/DOPPLER COMPLETE: CPT

## 2022-02-05 ENCOUNTER — PATIENT OUTREACH (OUTPATIENT)
Dept: ADMINISTRATIVE | Facility: OTHER | Age: 72
End: 2022-02-05
Payer: MEDICARE

## 2022-02-07 ENCOUNTER — OFFICE VISIT (OUTPATIENT)
Dept: NEUROLOGY | Facility: CLINIC | Age: 72
End: 2022-02-07
Payer: MEDICARE

## 2022-02-07 VITALS
HEART RATE: 88 BPM | HEIGHT: 70 IN | BODY MASS INDEX: 27.84 KG/M2 | DIASTOLIC BLOOD PRESSURE: 80 MMHG | OXYGEN SATURATION: 99 % | WEIGHT: 194.44 LBS | RESPIRATION RATE: 14 BRPM | SYSTOLIC BLOOD PRESSURE: 100 MMHG

## 2022-02-07 DIAGNOSIS — R25.1 TREMOR: ICD-10-CM

## 2022-02-07 DIAGNOSIS — G25.0 BENIGN ESSENTIAL TREMOR: Primary | ICD-10-CM

## 2022-02-07 PROCEDURE — 1101F PT FALLS ASSESS-DOCD LE1/YR: CPT | Mod: CPTII,S$GLB,, | Performed by: PSYCHIATRY & NEUROLOGY

## 2022-02-07 PROCEDURE — 3074F PR MOST RECENT SYSTOLIC BLOOD PRESSURE < 130 MM HG: ICD-10-PCS | Mod: CPTII,S$GLB,, | Performed by: PSYCHIATRY & NEUROLOGY

## 2022-02-07 PROCEDURE — 3008F BODY MASS INDEX DOCD: CPT | Mod: CPTII,S$GLB,, | Performed by: PSYCHIATRY & NEUROLOGY

## 2022-02-07 PROCEDURE — 1159F PR MEDICATION LIST DOCUMENTED IN MEDICAL RECORD: ICD-10-PCS | Mod: CPTII,S$GLB,, | Performed by: PSYCHIATRY & NEUROLOGY

## 2022-02-07 PROCEDURE — 99205 OFFICE O/P NEW HI 60 MIN: CPT | Mod: S$GLB,,, | Performed by: PSYCHIATRY & NEUROLOGY

## 2022-02-07 PROCEDURE — 1126F PR PAIN SEVERITY QUANTIFIED, NO PAIN PRESENT: ICD-10-PCS | Mod: CPTII,S$GLB,, | Performed by: PSYCHIATRY & NEUROLOGY

## 2022-02-07 PROCEDURE — 3288F FALL RISK ASSESSMENT DOCD: CPT | Mod: CPTII,S$GLB,, | Performed by: PSYCHIATRY & NEUROLOGY

## 2022-02-07 PROCEDURE — 1101F PR PT FALLS ASSESS DOC 0-1 FALLS W/OUT INJ PAST YR: ICD-10-PCS | Mod: CPTII,S$GLB,, | Performed by: PSYCHIATRY & NEUROLOGY

## 2022-02-07 PROCEDURE — 99999 PR PBB SHADOW E&M-EST. PATIENT-LVL IV: ICD-10-PCS | Mod: PBBFAC,,, | Performed by: PSYCHIATRY & NEUROLOGY

## 2022-02-07 PROCEDURE — 99205 PR OFFICE/OUTPT VISIT, NEW, LEVL V, 60-74 MIN: ICD-10-PCS | Mod: S$GLB,,, | Performed by: PSYCHIATRY & NEUROLOGY

## 2022-02-07 PROCEDURE — 3288F PR FALLS RISK ASSESSMENT DOCUMENTED: ICD-10-PCS | Mod: CPTII,S$GLB,, | Performed by: PSYCHIATRY & NEUROLOGY

## 2022-02-07 PROCEDURE — 3079F PR MOST RECENT DIASTOLIC BLOOD PRESSURE 80-89 MM HG: ICD-10-PCS | Mod: CPTII,S$GLB,, | Performed by: PSYCHIATRY & NEUROLOGY

## 2022-02-07 PROCEDURE — 3079F DIAST BP 80-89 MM HG: CPT | Mod: CPTII,S$GLB,, | Performed by: PSYCHIATRY & NEUROLOGY

## 2022-02-07 PROCEDURE — 3008F PR BODY MASS INDEX (BMI) DOCUMENTED: ICD-10-PCS | Mod: CPTII,S$GLB,, | Performed by: PSYCHIATRY & NEUROLOGY

## 2022-02-07 PROCEDURE — 1159F MED LIST DOCD IN RCRD: CPT | Mod: CPTII,S$GLB,, | Performed by: PSYCHIATRY & NEUROLOGY

## 2022-02-07 PROCEDURE — 99999 PR PBB SHADOW E&M-EST. PATIENT-LVL IV: CPT | Mod: PBBFAC,,, | Performed by: PSYCHIATRY & NEUROLOGY

## 2022-02-07 PROCEDURE — 3074F SYST BP LT 130 MM HG: CPT | Mod: CPTII,S$GLB,, | Performed by: PSYCHIATRY & NEUROLOGY

## 2022-02-07 PROCEDURE — 1126F AMNT PAIN NOTED NONE PRSNT: CPT | Mod: CPTII,S$GLB,, | Performed by: PSYCHIATRY & NEUROLOGY

## 2022-02-07 RX ORDER — PRIMIDONE 50 MG/1
50 TABLET ORAL NIGHTLY
Qty: 30 TABLET | Refills: 5 | Status: SHIPPED | OUTPATIENT
Start: 2022-02-07 | End: 2022-03-07

## 2022-02-07 RX ORDER — ACETAMINOPHEN 500 MG
2000 TABLET ORAL DAILY
COMMUNITY

## 2022-02-07 NOTE — PROGRESS NOTES
Subjective:       Patient ID: Ezio James is a 71 y.o. male.    Chief Complaint: Tremors          HPI         The patient is presenting with tremors that started 7 years ago (2015). The tremors started insidiously and progressed slowly over the last 7 years. The tremors are mainly in both arms (RT>LT). The tremors do emerge during action like writing or holding things. No rest tremors. No neck tremors. No leg tremors upon standing. No voice tremors. No muscle rigidity of muscle stiffness. No postural instability. No memory loss or dementia. Cannot tell if alcohol improves the tremors because the patient does not drink enough. Anxiety and emotional stress exacerbates the tremor. No significant diurnal variation in the tremors. No falls. No history of strokes. No head injury. No stimulants on board. No new meds started recently. Multiple family members have similar tremors, especially his father. One of his brothers was diagnosed with Parkinsons disease. He noted that coffee made the tremors much worse.             Review of Systems   Constitutional: Negative for appetite change and fatigue.   HENT: Positive for hearing loss. Negative for tinnitus.    Eyes: Negative for photophobia and visual disturbance.   Respiratory: Negative for apnea and shortness of breath.    Cardiovascular: Negative for chest pain and palpitations.   Gastrointestinal: Negative for nausea and vomiting.   Endocrine: Negative for cold intolerance and heat intolerance.   Genitourinary: Negative for difficulty urinating and urgency.   Musculoskeletal: Positive for gait problem. Negative for arthralgias, back pain, joint swelling, myalgias, neck pain and neck stiffness.   Skin: Negative for color change and rash.   Allergic/Immunologic: Negative for environmental allergies and immunocompromised state.   Neurological: Positive for tremors. Negative for dizziness, seizures, syncope, facial asymmetry, speech difficulty, weakness, light-headedness,  numbness and headaches.   Hematological: Negative for adenopathy. Does not bruise/bleed easily.   Psychiatric/Behavioral: Negative for agitation, behavioral problems, confusion, decreased concentration, dysphoric mood, hallucinations, self-injury, sleep disturbance and suicidal ideas. The patient is not hyperactive.                Current Outpatient Medications:     aspirin (ECOTRIN) 81 MG EC tablet, Take 1 tablet (81 mg total) by mouth once daily., Disp: 90 tablet, Rfl: 2    cholecalciferol, vitamin D3, (VITAMIN D3) 125 mcg (5,000 unit) Tab, Take 5,000 Units by mouth once daily., Disp: , Rfl:     cyanocobalamin (VITAMIN B-12) 1000 MCG tablet, Take 100 mcg by mouth once daily., Disp: , Rfl:     levothyroxine (SYNTHROID) 88 MCG tablet, Take 1 tablet (88 mcg total) by mouth before breakfast., Disp: 90 tablet, Rfl: 1    lisinopriL 10 MG tablet, Take 1 tablet (10 mg total) by mouth once daily., Disp: 90 tablet, Rfl: 3    metoprolol succinate (TOPROL-XL) 50 MG 24 hr tablet, Take 1 tablet (50 mg total) by mouth once daily., Disp: 90 tablet, Rfl: 3    multivitamin capsule, Take 1 capsule by mouth once daily., Disp: , Rfl:     rosuvastatin (CRESTOR) 10 MG tablet, Take 1 tablet (10 mg total) by mouth every evening., Disp: 90 tablet, Rfl: 0    primidone (MYSOLINE) 50 MG Tab, Take 1 tablet (50 mg total) by mouth every evening., Disp: 30 tablet, Rfl: 5         Past Medical History:   Diagnosis Date    Atrial fibrillation, chronic     Chronic systolic congestive heart failure 5/4/2018    Colon polyp 04,15    Essential hypertension 6/14/2017    Subclinical hypothyroidism      Past Surgical History:   Procedure Laterality Date    COLONOSCOPY  2004,5/15    with polyps per Chart    LEFT HEART CATHETERIZATION Left 6/21/2018    Procedure: HEART CATH-LEFT;  Surgeon: Leon James MD;  Location: [facility] CATH LAB;  Service: Cardiology;  Laterality: Left;  7:30 start time  right radial approach     Social History      Socioeconomic History    Marital status:    Tobacco Use    Smoking status: Never Smoker    Smokeless tobacco: Never Used   Substance and Sexual Activity    Alcohol use: Yes     Comment: Rarely    Drug use: No    Sexual activity: Yes     Partners: Female     Birth control/protection: None             Past/Current Medical/Surgical History, Past/Current Social History, Past/Current Family History and Past/Current Medications were reviewed in detail.        Objective:           VITAL SIGNS WERE REVIEWED      GENERAL APPEARANCE:     The patient looks comfortable.    BMI 27.90    No signs of respiratory distress.    Normal breathing pattern.    No dysmorphic features    Normal eye contact.     GENERAL MEDICAL EXAM:    HEENT:  Head is atraumatic normocephalic. Fundoscopic (Ophthalmoscopic) exam showed no disc edema.      Neck and Axillae: No JVD. No visible lesions.    Cardiopulmonary: No cyanosis. No tachypnea. Normal respiratory effort.    Gastrointestinal/Urogenital:  No jaundice. No stomas or lesions. No visible hernias. No catheters.     Skin, Hair and Nails: No pathognonomic skin rash. No neurofibromatosis. No visible lesions.No stigmata of autoimmune disease. No clubbing.    Limbs: No varicose veins. No visible swelling.    Muskoskeletal: No visible deformities.No visible lesions.           Neurologic Exam     Mental Status   Oriented to person, place, and time.   Follows 3 step commands.   Attention: normal. Concentration: normal.   Speech: speech is normal   Level of consciousness: alert  Knowledge: good.   Able to name object. Able to repeat. Normal comprehension.     Cranial Nerves   Cranial nerves II through XII intact.     CN II   Visual fields full to confrontation.   Visual acuity: normal  Right visual field deficit: none  Left visual field deficit: none     CN III, IV, VI   Pupils are equal, round, and reactive to light.  Extraocular motions are normal.   Right pupil: Size: 2 mm. Shape:  regular. Reactivity: brisk. Consensual response: intact. Accommodation: intact.   Left pupil: Size: 2 mm. Shape: regular. Reactivity: brisk. Consensual response: intact. Accommodation: intact.   CN III: no CN III palsy  CN VI: no CN VI palsy  Nystagmus: none   Diplopia: none  Ophthalmoparesis: none  Upgaze: normal  Downgaze: normal  Conjugate gaze: present  Vestibulo-ocular reflex: present    CN V   Facial sensation intact.   Right facial sensation deficit: none  Left facial sensation deficit: none    CN VII   Facial expression full, symmetric.   Right facial weakness: none  Left facial weakness: none    CN VIII   CN VIII normal.   Hearing: intact    CN IX, X   CN IX normal.   CN X normal.   Palate: symmetric    CN XI   CN XI normal.   Right sternocleidomastoid strength: normal  Left sternocleidomastoid strength: normal  Right trapezius strength: normal  Left trapezius strength: normal    CN XII   CN XII normal.   Tongue: not atrophic  Fasciculations: absent  Tongue deviation: none    Motor Exam   Muscle bulk: normal  Overall muscle tone: normal  Right arm tone: normal  Left arm tone: normal  Right arm pronator drift: absent  Left arm pronator drift: absent  Right leg tone: normal  Left leg tone: normal    Strength   Strength 5/5 throughout.   Right neck flexion: 5/5  Left neck flexion: 5/5  Right neck extension: 5/5  Left neck extension: 5/5  Right deltoid: 5/5  Left deltoid: 5/5  Right biceps: 5/5  Left biceps: 5/5  Right triceps: 5/5  Left triceps: 5/5  Right wrist flexion: 5/5  Left wrist flexion: 5/5  Right wrist extension: 5/5  Left wrist extension: 5/5  Right interossei: 5/5  Left interossei: 5/5  Right iliopsoas: 5/5  Left iliopsoas: 5/5  Right quadriceps: 5/5  Left quadriceps: 5/5  Right hamstrin/5  Left hamstrin/5  Right glutei: 5/5  Left glutei: 5/5  Right anterior tibial: 5/5  Left anterior tibial: 5/5  Right posterior tibial: 5/5  Left posterior tibial: 5/5  Right peroneal: 5/5  Left peroneal:  5/5  Right gastroc: 5/5  Left gastroc: 5/5    Sensory Exam   Light touch normal.   Right arm light touch: normal  Left arm light touch: normal  Right leg light touch: normal  Left leg light touch: normal  Right arm vibration: normal  Left arm vibration: normal  Right leg vibration: decreased from toes  Left leg vibration: decreased from toes  Right arm proprioception: normal  Left arm proprioception: normal  Right leg proprioception: decreased from toes  Left leg proprioception: decreased from toes  Pinprick normal.   Right arm pinprick: normal  Left arm pinprick: normal  Right leg pinprick: normal  Left leg pinprick: normal  Graphesthesia: normal  Stereognosis: normal    Gait, Coordination, and Reflexes     Gait  Gait: normal    Coordination   Romberg: negative  Finger to nose coordination: normal  Heel to shin coordination: normal  Tandem walking coordination: normal    Tremor   Resting tremor: absent  Intention tremor: absent  Action tremor: left arm and right arm    Reflexes   Right brachioradialis: 2+  Left brachioradialis: 2+  Right biceps: 2+  Left biceps: 2+  Right triceps: 2+  Left triceps: 2+  Right patellar: 1+  Left patellar: 1+  Right achilles: 0  Left achilles: 0  Right plantar: normal  Left plantar: normal  Right Henderson: absent  Left Henderson: absent  Right ankle clonus: absent  Left ankle clonus: absent  Right pendular knee jerk: absent  Left pendular knee jerk: absent  BUE AP 10 Hz Tremors     RT>LT    Moderate-Severe        Lab Results   Component Value Date    WBC 6.78 02/01/2022    HGB 14.7 02/01/2022    HCT 45.5 02/01/2022    MCV 99 (H) 02/01/2022     02/01/2022     Sodium   Date Value Ref Range Status   02/01/2022 141 136 - 145 mmol/L Final     Potassium   Date Value Ref Range Status   02/01/2022 5.0 3.5 - 5.1 mmol/L Final     Chloride   Date Value Ref Range Status   02/01/2022 104 95 - 110 mmol/L Final     CO2   Date Value Ref Range Status   02/01/2022 29 23 - 29 mmol/L Final      Glucose   Date Value Ref Range Status   02/01/2022 93 70 - 110 mg/dL Final     BUN   Date Value Ref Range Status   02/01/2022 16 8 - 23 mg/dL Final     Creatinine   Date Value Ref Range Status   02/01/2022 0.8 0.5 - 1.4 mg/dL Final     Calcium   Date Value Ref Range Status   02/01/2022 9.7 8.7 - 10.5 mg/dL Final     Total Protein   Date Value Ref Range Status   02/01/2022 7.2 6.0 - 8.4 g/dL Final     Albumin   Date Value Ref Range Status   02/01/2022 4.0 3.5 - 5.2 g/dL Final     Total Bilirubin   Date Value Ref Range Status   02/01/2022 1.1 (H) 0.1 - 1.0 mg/dL Final     Comment:     For infants and newborns, interpretation of results should be based  on gestational age, weight and in agreement with clinical  observations.    Premature Infant recommended reference ranges:  Up to 24 hours.............<8.0 mg/dL  Up to 48 hours............<12.0 mg/dL  3-5 days..................<15.0 mg/dL  6-29 days.................<15.0 mg/dL       Alkaline Phosphatase   Date Value Ref Range Status   02/01/2022 58 55 - 135 U/L Final     AST   Date Value Ref Range Status   02/01/2022 24 10 - 40 U/L Final     ALT   Date Value Ref Range Status   02/01/2022 17 10 - 44 U/L Final     Anion Gap   Date Value Ref Range Status   02/01/2022 8 8 - 16 mmol/L Final     eGFR if    Date Value Ref Range Status   02/01/2022 >60.0 >60 mL/min/1.73 m^2 Final     eGFR if non    Date Value Ref Range Status   02/01/2022 >60.0 >60 mL/min/1.73 m^2 Final     Comment:     Calculation used to obtain the estimated glomerular filtration  rate (eGFR) is the CKD-EPI equation.        Lab Results   Component Value Date    [id number] 832 02/01/2022     Lab Results   Component Value Date    TSH 6.052 (H) 02/01/2022    FREET4 0.88 02/01/2022 02-    Free T4 NL    02-    CTH No significant abnormalities     Atrophy and chronic white matter changes      Reviewed the neuroimaging independently       Assessment:       1.  Benign essential tremor    2. Tremor        Plan:       ESSENTIAL TREMOR (ET), BENIGN, BUE, RT >LT, ONSET 2015         CTH WO     Avoid stimulants like caffeine, nicotineetc.  Avoid hot drinks, drink from half empty glasses and wear heavy bracelet/watch.     Will start primidone and titrate slowly to 50 mg QHS as a starting dose. The main side effect is sedation and was communicated with the patient. Sexual dysfunction and depression could happen as well. The patient verbalized understanding.    I also counseled the patient about the fact the medication decreases the amplitude and not the rate of the tremor and less effective for titubition.  I also counseled the patient that the disease is slowly progressive.       NEXT OPTIONS:    Topiramate (Topamax)TPM titration to  mg BID which can cause mental slowing, transient tingling, kidney stones, weight loss, cleft lip and palate and rarely glaucoma and visual field defects . The patient was encouraged to drink a lot of fluids.     Methazolamide (Neptazane) 50 mg TID (100 mg BID) which can cause sedation, numbness, gastrointestinal upset and rarely kidney stones. Safety during pregnancy is unknown.    He is already on Metoprolol.         INTERVENTIONAL OPTIONS:      Neuravive, Gamma Knife and DBS.         MEDICAL/SURGICAL COMORBIDITIES     All relevant medical comorbidities noted and managed by primary care physician and medical care team.          MISCELLANEOUS MEDICAL PROBLEMS       HEALTHY LIFESTYLE AND PREVENTATIVE CARE    The patient to adhere to the age-appropriate health maintenance guidelines including screening tests and vaccinations. The patient to adhere to  healthy lifestyle, optimal weight, exercise, healthy diet, good sleep hygiene and avoiding drugs including smoking, alcohol and recreational drugs.      RTC in 4 weeks             Geri Meeks MD, FAAN    Attending Neurologist/Epileptologist         Diplomate, American Board of Psychiatry and  Neurology    Diplomate, American Board of Clinical Neurophysiology     Fellow, American Academy of Neurology             TOTAL E/M 60

## 2022-02-07 NOTE — PATIENT INSTRUCTIONS
"Patient Education       Essential Tremor   About this topic   A tremor happens when a part of your body shakes or trembles and you cannot control it. Essential tremor is a condition where you have tremors, most often in the hands, arms, or head. It does not often affect your legs or the rest of your upper body. It may happen when you are relaxed, when you try to hold a body part still, or when you are moving. It often occurs when you try to hold your arms outstretched and still. They may also be brought on by a specific movement. This may be something like writing, drinking, or touching something. You may notice the shaking on one or both sides of your body. Even your voice may sound shaky. Essential tremor gets worse over time, but this most often happens very slowly. The tremor does not usually affect your ability to do tasks at first. It may finally become so bad that you have problems doing your normal activities.  There is no cure for essential tremors. There are treatments that can help manage the signs.  What are the causes?   Essential tremors are not linked to a disease or other health problem. It can be passed down within families.  What can make this more likely to happen?   This problem can happen at any age but older adults may be more at risk.  What are the main signs?   · Shaking or trembling of the hands or other body parts. This is more likely to happen when you are trying to hold a position or do something like write or drink from a cup.  · Up and down movements of the hands  · Nodding or turning the head, as if repeatedly nodding or saying "no-no."  · Change in voice  How does the doctor diagnose this health problem?   Your doctor will take your history and do an exam. The doctor will talk with you about your shaking. Tell the doctor when you first started having problems with shaking. Also, talk about if there are things that make the shaking better or worse.  Your doctor may order:  · Lab " tests  · CT or MRI scan  · Electro myelogram (EMG)  How does the doctor treat this health problem?   · The doctor may order drugs to control the shaking.  · Your doctor may send you to physical and occupational therapy. This will help you learn exercises to lower the shaking. You may also learn easier ways to do things like get dressed, take a bath, or feed yourself.  · Your doctor may suggest surgery if the shaking is very bad and drugs do not help. This surgery is done to place a small electrical stimulator into the brain that stops the shaking.  What drugs may be needed?   The doctor may order drugs to:  · Control the shaking  · Improve muscle coordination and movements  · Help you relax  Will there be any other care needed?   · Ask your doctor what you need to do when you go home. Make sure you ask questions if you do not understand what the doctor says. This way you will know what you need to do.  · Your doctor may suggest some tools to help you with everyday activities. These may include:  ? Weighted mugs, glasses, spoons, and forks  ? Side-guarded plates  ? Spill-proof cups and glasses  ? Straws  ? Writing tools  ? Computer aids  · The doctor may suggest you make some changes to your diet and activities. These may include:  ? Avoid food and drinks with caffeine, such as coffee, tea, chocolate, and cola drinks.  ? Get 7 to 8 hours of sleep each night.  ? Avoid stress. Learn relaxation exercises.  ? Quit smoking if you are a smoker.  ? Avoid being in places where it is very hot or very cold.  What can be done to prevent this health problem?   There is nothing you can do to prevent essential tremors.  Helpful tips   · Join a support group. Talking to people with the same problem may help you cope with your illness.  · When eating out in restaurants, ask for your meat to be cut and for a straw with your drink. Ask that your soup be served in a mug instead of a bowl.  · Consider using electric personal items like  an electric toothbrush or electric razor.  · Use the speaker feature of your phone when making or answering calls. Use speaking features on your phone or on other electronic devices for texting or typing.  Where can I learn more?   Better [employer]  https://www.betterhealth.[place].gov.au/health/ConditionsAndTreatments/essential-tremor   Family Doctor.org  https://familydoctor.org/condition/essential-tremor/   National [place] of Neurological Disorders and Stroke  https://www.ninds.nih.gov/Disorders/All-Disorders/Essential-Tremor-Information-Page   Last Reviewed Date   2020-06-12  Consumer Information Use and Disclaimer   This information is not specific medical advice and does not replace information you receive from your health care provider. This is only a brief summary of general information. It does NOT include all information about conditions, illnesses, injuries, tests, procedures, treatments, therapies, discharge instructions or life-style choices that may apply to you. You must talk with your health care provider for complete information about your health and treatment options. This information should not be used to decide whether or not to accept your health care providers advice, instructions or recommendations. Only your health care provider has the knowledge and training to provide advice that is right for you.  Copyright   Copyright © 2021 Up[employer], Inc. and its affiliates and/or licensors. All rights reserved.  Patient Education       Primidone (SARAH mi done)   Brand Names: US Mysoline   What is this drug used for?   · It is used to help control certain kinds of seizures.  · It may be given to you for other reasons. Talk with the doctor.    What do I need to tell my doctor BEFORE I take this drug?   · If you are allergic to this drug; any part of this drug; or any other drugs, foods, or substances. Tell your doctor about the allergy and what signs you had.  · If you have porphyria.  This is not a  list of all drugs or health problems that interact with this drug.  Tell your doctor and pharmacist about all of your drugs (prescription or OTC, natural products, vitamins) and health problems. You must check to make sure that it is safe for you to take this drug with all of your drugs and health problems. Do not start, stop, or change the dose of any drug without checking with your doctor.  What are some things I need to know or do while I take this drug?   · Tell all of your health care providers that you take this drug. This includes your doctors, nurses, pharmacists, and dentists.  · Avoid driving and doing other tasks or actions that call for you to be alert until you see how this drug affects you.  · Do not stop taking this drug all of a sudden without calling your doctor. You may have a greater risk of seizures. If you need to stop this drug, you will want to slowly stop it as ordered by your doctor.  · It may take several weeks to see the full effects.  · Have blood work checked as you have been told by the doctor. Talk with the doctor.  · Talk with your doctor before you use alcohol, marijuana or other forms of cannabis, or prescription or OTC drugs that may slow your actions.  · If seizures are different or worse after starting this drug, talk with the doctor.  · Birth control pills and other hormone-based birth control may not work as well to prevent pregnancy. Use some other kind of birth control also like a condom when taking this drug.  · This drug may cause harm to the unborn baby if you take it while you are pregnant. If you are pregnant or you get pregnant while taking this drug, call your doctor right away.  · Tell your doctor if you are breast-feeding. You will need to talk about any risks to your baby.    What are some side effects that I need to call my doctor about right away?   WARNING/CAUTION: Even though it may be rare, some people may have very bad and sometimes deadly side effects when  taking a drug. Tell your doctor or get medical help right away if you have any of the following signs or symptoms that may be related to a very bad side effect:  · Signs of an allergic reaction, like rash; hives; itching; red, swollen, blistered, or peeling skin with or without fever; wheezing; tightness in the chest or throat; trouble breathing, swallowing, or talking; unusual hoarseness; or swelling of the mouth, face, lips, tongue, or throat.  · Not able to get or keep an erection.  · Change in eyesight.  · Not able to control eye movements.  · Fever, chills, or sore throat.  · Swollen gland.  · Shortness of breath.  · Change in balance.  · Trouble walking.  · Like other drugs that may be used for seizures, this drug may rarely raise the risk of suicidal thoughts or actions. The risk may be higher in people who have had suicidal thoughts or actions in the past. Call the doctor right away about any new or worse signs like depression; feeling nervous, restless, or grouchy; panic attacks; or other changes in mood or behavior. Call the doctor right away if any suicidal thoughts or actions occur.  What are some other side effects of this drug?   All drugs may cause side effects. However, many people have no side effects or only have minor side effects. Call your doctor or get medical help if any of these side effects or any other side effects bother you or do not go away:  · Feeling dizzy, sleepy, tired, or weak.  · Upset stomach or throwing up.  · Not hungry.  These are not all of the side effects that may occur. If you have questions about side effects, call your doctor. Call your doctor for medical advice about side effects.  You may report side effects to your national health agency.  You may report side effects to the FDA at 1-598.296.9740. You may also report side effects at https://www.fda.gov/medwatch.  How is this drug best taken?   Use this drug as ordered by your doctor. Read all information given to you.  Follow all instructions closely.  · Keep taking this drug as you have been told by your doctor or other health care provider, even if you feel well.  What do I do if I miss a dose?   · Take a missed dose as soon as you think about it.  · If it is close to the time for your next dose, skip the missed dose and go back to your normal time.  · Do not take 2 doses at the same time or extra doses.    How do I store and/or throw out this drug?   · Store at room temperature in a dry place. Do not store in a bathroom.  · Keep all drugs in a safe place. Keep all drugs out of the reach of children and pets.  · Throw away unused or  drugs. Do not flush down a toilet or pour down a drain unless you are told to do so. Check with your pharmacist if you have questions about the best way to throw out drugs. There may be drug take-back programs in your area.    General drug facts   · If your symptoms or health problems do not get better or if they become worse, call your doctor.  · Do not share your drugs with others and do not take anyone else's drugs.  · Some drugs may have another patient information leaflet. If you have any questions about this drug, please talk with your doctor, nurse, pharmacist, or other health care provider.  · This drug comes with an extra patient fact sheet called a Medication Guide. Read it with care. Read it again each time this drug is refilled. If you have any questions about this drug, please talk with the doctor, pharmacist, or other health care provider.  · If you think there has been an overdose, call your poison control center or get medical care right away. Be ready to tell or show what was taken, how much, and when it happened.    Consumer Information Use and Disclaimer   This generalized information is a limited summary of diagnosis, treatment, and/or medication information. It is not meant to be comprehensive and should be used as a tool to help the user understand and/or assess  potential diagnostic and treatment options. It does NOT include all information about conditions, treatments, medications, side effects, or risks that may apply to a specific patient. It is not intended to be medical advice or a substitute for the medical advice, diagnosis, or treatment of a health care provider based on the health care provider's examination and assessment of a patient's specific and unique circumstances. Patients must speak with a health care provider for complete information about their health, medical questions, and treatment options, including any risks or benefits regarding use of medications. This information does not endorse any treatments or medications as safe, effective, or approved for treating a specific patient. Up[employer]Date, Inc. and its affiliates disclaim any warranty or liability relating to this information or the use thereof. The use of this information is governed by the Terms of Use, available at https://www.[employer].com/en/solutions/lexicomp/about/thu.  Last Reviewed Date   2020-04-23  Copyright   © 2021 UpToDate, Inc. and its affiliates and/or licensors. All rights reserved.

## 2022-02-09 ENCOUNTER — OFFICE VISIT (OUTPATIENT)
Dept: CARDIOLOGY | Facility: CLINIC | Age: 72
End: 2022-02-09
Payer: MEDICARE

## 2022-02-09 VITALS
WEIGHT: 199.31 LBS | OXYGEN SATURATION: 98 % | HEART RATE: 90 BPM | HEIGHT: 70 IN | SYSTOLIC BLOOD PRESSURE: 118 MMHG | DIASTOLIC BLOOD PRESSURE: 80 MMHG | BODY MASS INDEX: 28.53 KG/M2

## 2022-02-09 DIAGNOSIS — I34.0 NONRHEUMATIC MITRAL VALVE REGURGITATION: ICD-10-CM

## 2022-02-09 DIAGNOSIS — E78.5 DYSLIPIDEMIA: ICD-10-CM

## 2022-02-09 DIAGNOSIS — E78.6 LOW HDL (UNDER 40): ICD-10-CM

## 2022-02-09 DIAGNOSIS — I51.7 LAE (LEFT ATRIAL ENLARGEMENT): ICD-10-CM

## 2022-02-09 DIAGNOSIS — I10 ESSENTIAL HYPERTENSION: ICD-10-CM

## 2022-02-09 DIAGNOSIS — R94.31 ABNORMAL ECG: ICD-10-CM

## 2022-02-09 DIAGNOSIS — I50.42 CHRONIC COMBINED SYSTOLIC AND DIASTOLIC CONGESTIVE HEART FAILURE: ICD-10-CM

## 2022-02-09 DIAGNOSIS — I42.8 NON-ISCHEMIC CARDIOMYOPATHY: Primary | ICD-10-CM

## 2022-02-09 DIAGNOSIS — I48.20 ATRIAL FIBRILLATION, CHRONIC: ICD-10-CM

## 2022-02-09 DIAGNOSIS — I51.7 LVH (LEFT VENTRICULAR HYPERTROPHY): ICD-10-CM

## 2022-02-09 PROCEDURE — 3008F BODY MASS INDEX DOCD: CPT | Mod: CPTII,S$GLB,, | Performed by: INTERNAL MEDICINE

## 2022-02-09 PROCEDURE — 1126F AMNT PAIN NOTED NONE PRSNT: CPT | Mod: CPTII,S$GLB,, | Performed by: INTERNAL MEDICINE

## 2022-02-09 PROCEDURE — 3074F PR MOST RECENT SYSTOLIC BLOOD PRESSURE < 130 MM HG: ICD-10-PCS | Mod: CPTII,S$GLB,, | Performed by: INTERNAL MEDICINE

## 2022-02-09 PROCEDURE — 1126F PR PAIN SEVERITY QUANTIFIED, NO PAIN PRESENT: ICD-10-PCS | Mod: CPTII,S$GLB,, | Performed by: INTERNAL MEDICINE

## 2022-02-09 PROCEDURE — 3079F PR MOST RECENT DIASTOLIC BLOOD PRESSURE 80-89 MM HG: ICD-10-PCS | Mod: CPTII,S$GLB,, | Performed by: INTERNAL MEDICINE

## 2022-02-09 PROCEDURE — 99214 PR OFFICE/OUTPT VISIT, EST, LEVL IV, 30-39 MIN: ICD-10-PCS | Mod: S$GLB,,, | Performed by: INTERNAL MEDICINE

## 2022-02-09 PROCEDURE — 99214 OFFICE O/P EST MOD 30 MIN: CPT | Mod: S$GLB,,, | Performed by: INTERNAL MEDICINE

## 2022-02-09 PROCEDURE — 3008F PR BODY MASS INDEX (BMI) DOCUMENTED: ICD-10-PCS | Mod: CPTII,S$GLB,, | Performed by: INTERNAL MEDICINE

## 2022-02-09 PROCEDURE — 99999 PR PBB SHADOW E&M-EST. PATIENT-LVL III: CPT | Mod: PBBFAC,,, | Performed by: INTERNAL MEDICINE

## 2022-02-09 PROCEDURE — 99999 PR PBB SHADOW E&M-EST. PATIENT-LVL III: ICD-10-PCS | Mod: PBBFAC,,, | Performed by: INTERNAL MEDICINE

## 2022-02-09 PROCEDURE — 3074F SYST BP LT 130 MM HG: CPT | Mod: CPTII,S$GLB,, | Performed by: INTERNAL MEDICINE

## 2022-02-09 PROCEDURE — 3079F DIAST BP 80-89 MM HG: CPT | Mod: CPTII,S$GLB,, | Performed by: INTERNAL MEDICINE

## 2022-02-09 NOTE — PROGRESS NOTES
Subjective:    Patient ID:  Ezio James is a 71 y.o. male who presents for evaluation of Atrial Fibrillation, Hyperlipidemia, Hypertension, Congestive Heart Failure, and Valvular Heart Disease      HPI Pt presents for eval.   His current med conditions include  HTN, hyperlipidemia, CAD, LAE, MR, CHF, LVH, cardiomyopathy, permanent a fib.   Nonsmoker.   Past hx pertinent for following:  He declined anticoagulation multiple times in past (coumadin and NOAC discussed), wanted to stay on asa.   2014 echo 40 - 45%.  Stress MPI 2014 EF 35%.  S/p [facility] 6/18 for abnl stress test and to more definitively assess LVEF.  Cath showed widely patent coronary arteries (only luminal irregularities) and LVEF 50 - 55%  ecg 7/26/19 a fib with controlled VR, low precordial R waves.   Has declined NOAC many times for his a fib in past (see prior notes).  ecg 1/29/21 A fib with controlled VR, low precordial R waves.  There are no acute changes.  Now here.  No angina.  No CHF sxs.  HTN is controlled on current med tx.  Lipids well controlled on statin tx.  Not enough exercise.  Echo 2/22 EF 45%, LAE, BLAZE, mod MR, mild TR.  Compliant w meds.  No TIA/CVA sxs.  On asa qd.  Echo 7/21 EF 35%, LAE, mild MR/TR.   Weight stable.       Past Medical History:   Diagnosis Date    Atrial fibrillation, chronic     Chronic systolic congestive heart failure 5/4/2018    Colon polyp 04,15    Essential hypertension 6/14/2017    Subclinical hypothyroidism      Current Outpatient Medications   Medication Instructions    aspirin (ECOTRIN) 81 mg, Oral, Daily    cholecalciferol (vitamin D3) (VITAMIN D3) 5,000 Units, Oral, Daily    cyanocobalamin (VITAMIN B-12) 100 mcg, Oral, Daily    levothyroxine (SYNTHROID) 88 mcg, Oral, Before breakfast    lisinopriL 10 mg, Oral, Daily    metoprolol succinate (TOPROL-XL) 50 mg, Oral, Daily    multivitamin capsule 1 capsule, Oral, Daily    primidone (MYSOLINE) 50 mg, Oral, Nightly    rosuvastatin (CRESTOR) 10 mg,  "Oral, Nightly         Review of Systems   Constitutional: Negative.   HENT: Negative.    Eyes: Negative.    Cardiovascular: Negative.    Respiratory: Negative.    Endocrine: Negative.    Hematologic/Lymphatic: Negative.    Skin: Negative.    Musculoskeletal: Negative.    Gastrointestinal: Negative.    Genitourinary: Negative.    Neurological: Positive for tremors.   Psychiatric/Behavioral: Negative.    Allergic/Immunologic: Negative.        /80 (BP Location: Left arm, Patient Position: Sitting, BP Method: Large (Manual))   Pulse 90   Ht 5' 10" (1.778 m)   Wt 90.4 kg (199 lb 4.7 oz)   SpO2 98%   BMI 28.60 kg/m²     Wt Readings from Last 3 Encounters:   02/09/22 90.4 kg (199 lb 4.7 oz)   02/07/22 88.2 kg (194 lb 7.1 oz)   02/03/22 90.7 kg (200 lb)     Temp Readings from Last 3 Encounters:   02/01/22 97.8 °F (36.6 °C) (Tympanic)   07/09/21 98.3 °F (36.8 °C) (Oral)   02/18/21 97.6 °F (36.4 °C) (Tympanic)     BP Readings from Last 3 Encounters:   02/09/22 118/80   02/07/22 100/80   02/03/22 120/76     Pulse Readings from Last 3 Encounters:   02/09/22 90   02/07/22 88   02/03/22 75          Objective:    Physical Exam  Vitals and nursing note reviewed.   Constitutional:       Appearance: He is well-developed and well-nourished.   HENT:      Head: Normocephalic.   Neck:      Thyroid: No thyromegaly.      Vascular: Normal carotid pulses. No carotid bruit, hepatojugular reflux or JVD.   Cardiovascular:      Rate and Rhythm: Normal rate. Rhythm irregularly irregular.      Chest Wall: PMI is not displaced.      Pulses: No midsystolic click and no opening snap.           Radial pulses are 2+ on the right side and 2+ on the left side.      Heart sounds: S1 normal and S2 normal. Heart sounds not distant. No murmur heard.  No friction rub. No S3 or S4 sounds.    Pulmonary:      Effort: Pulmonary effort is normal.      Breath sounds: Normal breath sounds. No wheezing or rales.   Abdominal:      General: Bowel sounds are " normal. There is no distension, ascites or abdominal bruit.      Palpations: Abdomen is soft. There is no mass.      Tenderness: There is no abdominal tenderness.   Musculoskeletal:         General: No edema.      Cervical back: Normal range of motion and neck supple.   Skin:     General: Skin is warm.   Neurological:      Mental Status: He is alert and oriented to person, place, and time.   Psychiatric:         Mood and Affect: Mood and affect normal.         Behavior: Behavior normal.       I have reviewed all pertinent labs and cardiac studies.      Chemistry        Component Value Date/Time     02/01/2022 1020    K 5.0 02/01/2022 1020     02/01/2022 1020    CO2 29 02/01/2022 1020    BUN 16 02/01/2022 1020    CREATININE 0.8 02/01/2022 1020    GLU 93 02/01/2022 1020        Component Value Date/Time    CALCIUM 9.7 02/01/2022 1020    ALKPHOS 58 02/01/2022 1020    AST 24 02/01/2022 1020    ALT 17 02/01/2022 1020    BILITOT 1.1 (H) 02/01/2022 1020    ESTGFRAFRICA >60.0 02/01/2022 1020    EGFRNONAA >60.0 02/01/2022 1020        Lab Results   Component Value Date    WBC 6.78 02/01/2022    HGB 14.7 02/01/2022    HCT 45.5 02/01/2022    MCV 99 (H) 02/01/2022     02/01/2022       No results found for: LABA1C, HGBA1C  Lab Results   Component Value Date    CHOL 124 02/01/2022    CHOL 120 02/18/2021    CHOL 118 (L) 11/07/2019     Lab Results   Component Value Date    HDL 40 02/01/2022    HDL 41 02/18/2021    HDL 39 (L) 11/07/2019     Lab Results   Component Value Date    LDLCALC 61.0 (L) 02/01/2022    LDLCALC 58.6 (L) 02/18/2021    LDLCALC 58.6 (L) 11/07/2019     Lab Results   Component Value Date    TRIG 115 02/01/2022    TRIG 102 02/18/2021    TRIG 102 11/07/2019     Lab Results   Component Value Date    CHOLHDL 32.3 02/01/2022    CHOLHDL 34.2 02/18/2021    CHOLHDL 33.1 11/07/2019         Results for orders placed during the hospital encounter of 02/03/22    Echo    Interpretation Summary  · The left  ventricle is normal in size with concentric remodeling  · The estimated PA systolic pressure is 37 mmHg.  · Normal right ventricular size with normal right ventricular systolic function.  · Intermediate central venous pressure (8 mmHg).  · The estimated ejection fraction is 45%.  · Moderate left atrial enlargement.  · Moderate right atrial enlargement.  · Moderate mitral regurgitation.  · Mild pulmonic regurgitation.  · Mild tricuspid regurgitation.  · Atrial fibrillation observed.        Assessment:       1. Non-ischemic cardiomyopathy    2. LVH (left ventricular hypertrophy)    3. Low HDL (under 40)    4. LAE (left atrial enlargement)    5. Essential hypertension    6. Chronic combined systolic and diastolic congestive heart failure    7. Abnormal ECG    8. Atrial fibrillation, chronic    9. Dyslipidemia    10. Nonrheumatic mitral valve regurgitation         Plan:             Stable cardiovascular conditions at present time on current medical treatment.  Reviewed all tests and above medical conditions with patient in detail and formulated treatment plan.  Continue optimal medical treatment for cardiovascular conditions.  Asa qd per pt preference.  Continue rate control for chronic a fib.   Cardiac low salt diet advised.  Daily exercise encouraged, with the goal 30 +  minutes aerobic exercise as tolerated.  Maintaining healthy weight and weight loss goals (if needed) were discussed in clinic.  Need for BP control and HTN goals (if needed) were discussed and tx plan formulated.  Continue current HTN meds.  Importance of optimal lipid control were discussed in detail as well as possible pharmacologic and lifestyle changes that may be needed.  Continue statin tx.  F/u in 1 year, sooner if needed.      I have reviewed all pertinent labs and cardiac studies independently. Plans and recommendations have been formulated under my direct supervision. All questions answered and patient voiced understanding.

## 2022-02-14 ENCOUNTER — HOSPITAL ENCOUNTER (OUTPATIENT)
Dept: RADIOLOGY | Facility: HOSPITAL | Age: 72
Discharge: HOME | End: 2022-02-14
Attending: PSYCHIATRY & NEUROLOGY
Payer: MEDICARE

## 2022-02-14 ENCOUNTER — TELEPHONE (OUTPATIENT)
Dept: NEUROLOGY | Facility: CLINIC | Age: 72
End: 2022-02-14
Payer: MEDICARE

## 2022-02-14 DIAGNOSIS — R25.1 TREMOR: ICD-10-CM

## 2022-02-14 PROCEDURE — 70450 CT HEAD/BRAIN W/O DYE: CPT | Mod: TC

## 2022-02-15 ENCOUNTER — TELEPHONE (OUTPATIENT)
Dept: NEUROLOGY | Facility: CLINIC | Age: 72
End: 2022-02-15
Payer: MEDICARE

## 2022-02-15 NOTE — TELEPHONE ENCOUNTER
----- Message from Inna Barajas sent at 2/15/2022 10:23 AM CST -----  Contact: self  Type:  Patient Returning Call    Who Called: Ezio James   Who Left Message for Patient: Sally  Does the patient know what this is regarding?: His test results  Would the patient rather a call back or a response via [facility]?  Call back   Best Call Back Number: [phone]   Additional Information: n/a

## 2022-02-16 ENCOUNTER — TELEPHONE (OUTPATIENT)
Dept: CARDIOLOGY | Facility: CLINIC | Age: 72
End: 2022-02-16
Payer: MEDICARE

## 2022-03-07 ENCOUNTER — OFFICE VISIT (OUTPATIENT)
Dept: NEUROLOGY | Facility: CLINIC | Age: 72
End: 2022-03-07
Payer: MEDICARE

## 2022-03-07 VITALS
DIASTOLIC BLOOD PRESSURE: 62 MMHG | BODY MASS INDEX: 27.52 KG/M2 | RESPIRATION RATE: 16 BRPM | HEIGHT: 70 IN | WEIGHT: 192.25 LBS | SYSTOLIC BLOOD PRESSURE: 110 MMHG

## 2022-03-07 DIAGNOSIS — G25.0 BENIGN ESSENTIAL TREMOR: Primary | ICD-10-CM

## 2022-03-07 DIAGNOSIS — R25.1 TREMOR: ICD-10-CM

## 2022-03-07 PROCEDURE — 3288F FALL RISK ASSESSMENT DOCD: CPT | Mod: CPTII,S$GLB,, | Performed by: NURSE PRACTITIONER

## 2022-03-07 PROCEDURE — 3008F BODY MASS INDEX DOCD: CPT | Mod: CPTII,S$GLB,, | Performed by: NURSE PRACTITIONER

## 2022-03-07 PROCEDURE — 1101F PR PT FALLS ASSESS DOC 0-1 FALLS W/OUT INJ PAST YR: ICD-10-PCS | Mod: CPTII,S$GLB,, | Performed by: NURSE PRACTITIONER

## 2022-03-07 PROCEDURE — 99214 PR OFFICE/OUTPT VISIT, EST, LEVL IV, 30-39 MIN: ICD-10-PCS | Mod: S$GLB,,, | Performed by: NURSE PRACTITIONER

## 2022-03-07 PROCEDURE — 3074F SYST BP LT 130 MM HG: CPT | Mod: CPTII,S$GLB,, | Performed by: NURSE PRACTITIONER

## 2022-03-07 PROCEDURE — 99999 PR PBB SHADOW E&M-EST. PATIENT-LVL IV: ICD-10-PCS | Mod: PBBFAC,,, | Performed by: NURSE PRACTITIONER

## 2022-03-07 PROCEDURE — 3078F DIAST BP <80 MM HG: CPT | Mod: CPTII,S$GLB,, | Performed by: NURSE PRACTITIONER

## 2022-03-07 PROCEDURE — 1126F AMNT PAIN NOTED NONE PRSNT: CPT | Mod: CPTII,S$GLB,, | Performed by: NURSE PRACTITIONER

## 2022-03-07 PROCEDURE — 3078F PR MOST RECENT DIASTOLIC BLOOD PRESSURE < 80 MM HG: ICD-10-PCS | Mod: CPTII,S$GLB,, | Performed by: NURSE PRACTITIONER

## 2022-03-07 PROCEDURE — 1159F MED LIST DOCD IN RCRD: CPT | Mod: CPTII,S$GLB,, | Performed by: NURSE PRACTITIONER

## 2022-03-07 PROCEDURE — 1159F PR MEDICATION LIST DOCUMENTED IN MEDICAL RECORD: ICD-10-PCS | Mod: CPTII,S$GLB,, | Performed by: NURSE PRACTITIONER

## 2022-03-07 PROCEDURE — 99214 OFFICE O/P EST MOD 30 MIN: CPT | Mod: S$GLB,,, | Performed by: NURSE PRACTITIONER

## 2022-03-07 PROCEDURE — 1101F PT FALLS ASSESS-DOCD LE1/YR: CPT | Mod: CPTII,S$GLB,, | Performed by: NURSE PRACTITIONER

## 2022-03-07 PROCEDURE — 3288F PR FALLS RISK ASSESSMENT DOCUMENTED: ICD-10-PCS | Mod: CPTII,S$GLB,, | Performed by: NURSE PRACTITIONER

## 2022-03-07 PROCEDURE — 1126F PR PAIN SEVERITY QUANTIFIED, NO PAIN PRESENT: ICD-10-PCS | Mod: CPTII,S$GLB,, | Performed by: NURSE PRACTITIONER

## 2022-03-07 PROCEDURE — 1160F PR REVIEW ALL MEDS BY PRESCRIBER/CLIN PHARMACIST DOCUMENTED: ICD-10-PCS | Mod: CPTII,S$GLB,, | Performed by: NURSE PRACTITIONER

## 2022-03-07 PROCEDURE — 99999 PR PBB SHADOW E&M-EST. PATIENT-LVL IV: CPT | Mod: PBBFAC,,, | Performed by: NURSE PRACTITIONER

## 2022-03-07 PROCEDURE — 3008F PR BODY MASS INDEX (BMI) DOCUMENTED: ICD-10-PCS | Mod: CPTII,S$GLB,, | Performed by: NURSE PRACTITIONER

## 2022-03-07 PROCEDURE — 3074F PR MOST RECENT SYSTOLIC BLOOD PRESSURE < 130 MM HG: ICD-10-PCS | Mod: CPTII,S$GLB,, | Performed by: NURSE PRACTITIONER

## 2022-03-07 PROCEDURE — 1160F RVW MEDS BY RX/DR IN RCRD: CPT | Mod: CPTII,S$GLB,, | Performed by: NURSE PRACTITIONER

## 2022-03-07 RX ORDER — PRIMIDONE 50 MG/1
50 TABLET ORAL NIGHTLY
Qty: 90 TABLET | Refills: 3 | Status: SHIPPED | OUTPATIENT
Start: 2022-03-07 | End: 2022-11-21 | Stop reason: SDUPTHER

## 2022-03-07 NOTE — PROGRESS NOTES
Subjective:       Patient ID: Ezio James is a 71 y.o. male.    Chief Complaint: Benign essential tremor      HPI     The patient is presenting with tremors that started 7 years ago (2015). The tremors started insidiously and progressed slowly over the last 7 years. The tremors are mainly in both arms (RT>LT). The tremors do emerge during action like writing or holding things. No rest tremors. No neck tremors. No leg tremors upon standing. No voice tremors. No muscle rigidity of muscle stiffness. No postural instability. No memory loss or dementia. Cannot tell if alcohol improves the tremors because the patient does not drink enough. Anxiety and emotional stress exacerbates the tremor. No significant diurnal variation in the tremors. No falls. No history of strokes. No head injury. No stimulants on board. No new meds started recently. Multiple family members have similar tremors, especially his father. One of his brothers was diagnosed with Parkinsons disease. He noted that coffee made the tremors much worse.     Interval History 3-7-2022: Patient established with Dr. Meeks, new to me. Patient presents to follow up appointment unaccompanied to discuss tremors. Patient is currently taking primidone 50 mg QHS without adverse effects. He states his tremors have improved on the medication. He states he is happy with his current regiment. 02- CTH No significant abnormalities. Atrophy and chronic white matter changes.      Review of Systems   Constitutional: Negative for appetite change and fatigue.   HENT: Positive for hearing loss. Negative for tinnitus.    Eyes: Negative for photophobia and visual disturbance.   Respiratory: Negative for apnea and shortness of breath.    Cardiovascular: Negative for chest pain and palpitations.   Gastrointestinal: Negative for nausea and vomiting.   Endocrine: Negative for cold intolerance and heat intolerance.   Genitourinary: Negative for difficulty urinating and urgency.    Musculoskeletal: Positive for gait problem. Negative for arthralgias, back pain, joint swelling, myalgias, neck pain and neck stiffness.   Skin: Negative for color change and rash.   Allergic/Immunologic: Negative for environmental allergies and immunocompromised state.   Neurological: Positive for tremors. Negative for dizziness, seizures, syncope, facial asymmetry, speech difficulty, weakness, light-headedness, numbness and headaches.   Hematological: Negative for adenopathy. Does not bruise/bleed easily.   Psychiatric/Behavioral: Negative for agitation, behavioral problems, confusion, decreased concentration, dysphoric mood, hallucinations, self-injury, sleep disturbance and suicidal ideas. The patient is not hyperactive.                Current Outpatient Medications:     aspirin (ECOTRIN) 81 MG EC tablet, Take 1 tablet (81 mg total) by mouth once daily., Disp: 90 tablet, Rfl: 2    cholecalciferol, vitamin D3, 125 mcg (5,000 unit) Tab, Take 5,000 Units by mouth once daily., Disp: , Rfl:     cyanocobalamin (VITAMIN B-12) 1000 MCG tablet, Take 100 mcg by mouth once daily., Disp: , Rfl:     levothyroxine (SYNTHROID) 88 MCG tablet, Take 1 tablet (88 mcg total) by mouth before breakfast., Disp: 90 tablet, Rfl: 1    lisinopriL 10 MG tablet, Take 1 tablet (10 mg total) by mouth once daily., Disp: 90 tablet, Rfl: 3    metoprolol succinate (TOPROL-XL) 50 MG 24 hr tablet, Take 1 tablet (50 mg total) by mouth once daily., Disp: 90 tablet, Rfl: 3    multivitamin capsule, Take 1 capsule by mouth once daily., Disp: , Rfl:     rosuvastatin (CRESTOR) 10 MG tablet, Take 1 tablet (10 mg total) by mouth every evening., Disp: 90 tablet, Rfl: 0    primidone (MYSOLINE) 50 MG Tab, Take 1 tablet (50 mg total) by mouth every evening., Disp: 90 tablet, Rfl: 3         Past Medical History:   Diagnosis Date    Atrial fibrillation, chronic     Chronic systolic congestive heart failure 5/4/2018    Colon polyp 04,15    Essential  hypertension 6/14/2017    Subclinical hypothyroidism      Past Surgical History:   Procedure Laterality Date    COLONOSCOPY  2004,5/15    with polyps per Chart    LEFT HEART CATHETERIZATION Left 6/21/2018    Procedure: HEART CATH-LEFT;  Surgeon: Leon James MD;  Location: [facility] CATH LAB;  Service: Cardiology;  Laterality: Left;  7:30 start time  right radial approach     Social History     Socioeconomic History    Marital status:    Tobacco Use    Smoking status: Never Smoker    Smokeless tobacco: Never Used   Substance and Sexual Activity    Alcohol use: Yes     Comment: Rarely    Drug use: No    Sexual activity: Yes     Partners: Female     Birth control/protection: None             Past/Current Medical/Surgical History, Past/Current Social History, Past/Current Family History and Past/Current Medications were reviewed in detail.        Objective:     VITAL SIGNS WERE REVIEWED      GENERAL APPEARANCE:     The patient looks comfortable.    BMI 27.58    No signs of respiratory distress.    Normal breathing pattern.    No dysmorphic features    Normal eye contact.     GENERAL MEDICAL EXAM:    HEENT:  Head is atraumatic normocephalic. Fundoscopic (Ophthalmoscopic) exam showed no disc edema.      Neck and Axillae: No JVD. No visible lesions.    Cardiopulmonary: No cyanosis. No tachypnea. Normal respiratory effort.    Gastrointestinal/Urogenital:  No jaundice. No stomas or lesions. No visible hernias. No catheters.     Skin, Hair and Nails: No pathognonomic skin rash. No neurofibromatosis. No visible lesions.No stigmata of autoimmune disease. No clubbing.    Limbs: No varicose veins. No visible swelling.    Muskoskeletal: No visible deformities.No visible lesions.           Neurologic Exam     Mental Status   Oriented to person, place, and time.   Follows 3 step commands.   Attention: normal. Concentration: normal.   Speech: speech is normal   Level of consciousness: alert  Knowledge: good.   Able to  name object. Able to repeat. Normal comprehension.     Cranial Nerves   Cranial nerves II through XII intact.     CN II   Visual fields full to confrontation.   Visual acuity: normal  Right visual field deficit: none  Left visual field deficit: none     CN III, IV, VI   Pupils are equal, round, and reactive to light.  Extraocular motions are normal.   Right pupil: Size: 2 mm. Shape: regular. Reactivity: brisk. Consensual response: intact. Accommodation: intact.   Left pupil: Size: 2 mm. Shape: regular. Reactivity: brisk. Consensual response: intact. Accommodation: intact.   CN III: no CN III palsy  CN VI: no CN VI palsy  Nystagmus: none   Diplopia: none  Ophthalmoparesis: none  Upgaze: normal  Downgaze: normal  Conjugate gaze: absent    CN V   Facial sensation intact.   Right facial sensation deficit: none  Left facial sensation deficit: none    CN VII   Facial expression full, symmetric.   Right facial weakness: none  Left facial weakness: none    CN VIII   CN VIII normal.   Hearing: intact    CN IX, X   CN IX normal.   CN X normal.   Palate: symmetric    CN XI   CN XI normal.   Right sternocleidomastoid strength: normal  Left sternocleidomastoid strength: normal  Right trapezius strength: normal  Left trapezius strength: normal    CN XII   CN XII normal.   Tongue: not atrophic  Fasciculations: absent  Tongue deviation: none    Motor Exam   Muscle bulk: normal  Overall muscle tone: normal  Right arm tone: normal  Left arm tone: normal  Right arm pronator drift: absent  Left arm pronator drift: absent  Right leg tone: normal  Left leg tone: normal    Strength   Strength 5/5 throughout.   Right neck flexion: 5/5  Left neck flexion: 5/5  Right neck extension: 5/5  Left neck extension: 5/5  Right deltoid: 5/5  Left deltoid: 5/5  Right biceps: 5/5  Left biceps: 5/5  Right triceps: 5/5  Left triceps: 5/5  Right wrist flexion: 5/5  Left wrist flexion: 5/5  Right wrist extension: 5/5  Left wrist extension: 5/5  Right  interossei: 5/5  Left interossei: 5/5  Right iliopsoas: 5/5  Left iliopsoas: 5/5  Right quadriceps: 5/5  Left quadriceps: 5/5  Right hamstrin/5  Left hamstrin/5  Right glutei: 5/5  Left glutei: 5/5  Right anterior tibial: 5/5  Left anterior tibial: 5/5  Right posterior tibial: 5/5  Left posterior tibial: 5/5  Right peroneal: 5/5  Left peroneal: 5/5  Right gastroc: 5/5  Left gastroc: 5/5    Sensory Exam   Light touch normal.   Right arm light touch: normal  Left arm light touch: normal  Right leg light touch: normal  Left leg light touch: normal  Right arm vibration: normal  Left arm vibration: normal  Right leg vibration: decreased from toes  Left leg vibration: decreased from toes  Right arm proprioception: normal  Left arm proprioception: normal  Right leg proprioception: decreased from toes  Left leg proprioception: decreased from toes  Pinprick normal.   Right arm pinprick: normal  Left arm pinprick: normal  Right leg pinprick: normal  Left leg pinprick: normal  Graphesthesia: normal  Stereognosis: normal    Gait, Coordination, and Reflexes     Gait  Gait: normal    Coordination   Romberg: negative  Finger to nose coordination: normal  Heel to shin coordination: normal  Tandem walking coordination: normal    Tremor   Resting tremor: absent  Intention tremor: absent  Action tremor: left arm and right arm    Reflexes   Right brachioradialis: 2+  Left brachioradialis: 2+  Right biceps: 2+  Left biceps: 2+  Right triceps: 2+  Left triceps: 2+  Right patellar: 1+  Left patellar: 1+  Right achilles: 0  Left achilles: 0  Right plantar: normal  Left plantar: normal  Right Henderson: absent  Left Henderson: absent  Right ankle clonus: absent  Left ankle clonus: absent  Right pendular knee jerk: absent  Left pendular knee jerk: absent  BUE AP 8 Hz Tremors     RT>LT    Moderate-Severe        Lab Results   Component Value Date    WBC 6.78 2022    HGB 14.7 2022    HCT 45.5 2022    MCV 99 (H) 2022      02/01/2022     Sodium   Date Value Ref Range Status   02/01/2022 141 136 - 145 mmol/L Final     Potassium   Date Value Ref Range Status   02/01/2022 5.0 3.5 - 5.1 mmol/L Final     Chloride   Date Value Ref Range Status   02/01/2022 104 95 - 110 mmol/L Final     CO2   Date Value Ref Range Status   02/01/2022 29 23 - 29 mmol/L Final     Glucose   Date Value Ref Range Status   02/01/2022 93 70 - 110 mg/dL Final     BUN   Date Value Ref Range Status   02/01/2022 16 8 - 23 mg/dL Final     Creatinine   Date Value Ref Range Status   02/01/2022 0.8 0.5 - 1.4 mg/dL Final     Calcium   Date Value Ref Range Status   02/01/2022 9.7 8.7 - 10.5 mg/dL Final     Total Protein   Date Value Ref Range Status   02/01/2022 7.2 6.0 - 8.4 g/dL Final     Albumin   Date Value Ref Range Status   02/01/2022 4.0 3.5 - 5.2 g/dL Final     Total Bilirubin   Date Value Ref Range Status   02/01/2022 1.1 (H) 0.1 - 1.0 mg/dL Final     Comment:     For infants and newborns, interpretation of results should be based  on gestational age, weight and in agreement with clinical  observations.    Premature Infant recommended reference ranges:  Up to 24 hours.............<8.0 mg/dL  Up to 48 hours............<12.0 mg/dL  3-5 days..................<15.0 mg/dL  6-29 days.................<15.0 mg/dL       Alkaline Phosphatase   Date Value Ref Range Status   02/01/2022 58 55 - 135 U/L Final     AST   Date Value Ref Range Status   02/01/2022 24 10 - 40 U/L Final     ALT   Date Value Ref Range Status   02/01/2022 17 10 - 44 U/L Final     Anion Gap   Date Value Ref Range Status   02/01/2022 8 8 - 16 mmol/L Final     eGFR if    Date Value Ref Range Status   02/01/2022 >60.0 >60 mL/min/1.73 m^2 Final     eGFR if non    Date Value Ref Range Status   02/01/2022 >60.0 >60 mL/min/1.73 m^2 Final     Comment:     Calculation used to obtain the estimated glomerular filtration  rate (eGFR) is the CKD-EPI equation.        Lab Results    Component Value Date    [id number] 832 02/01/2022     Lab Results   Component Value Date    TSH 6.052 (H) 02/01/2022    FREET4 0.88 02/01/2022 02-    Free T4 NL      02-    CTH No significant abnormalities     Atrophy and chronic white matter changes      Reviewed the neuroimaging independently       Assessment:       1. Benign essential tremor    2. Tremor        Plan:       ESSENTIAL TREMOR (ET), BENIGN, BUE, RT >LT, ONSET 2015         Avoid stimulants like caffeine, nicotineetc.    Avoid hot drinks, drink from half empty glasses and wear heavy bracelet/watch.     Continue primidone 50 mg QHS. The main side effect is sedation and was communicated with the patient. Sexual dysfunction and depression could happen as well. The patient verbalized understanding.    I also counseled the patient about the fact the medication decreases the amplitude and not the rate of the tremor and less effective for titubition.  I also counseled the patient that the disease is slowly progressive.       NEXT OPTIONS:    Topiramate (Topamax)TPM titration to  mg BID which can cause mental slowing, transient tingling, kidney stones, weight loss, cleft lip and palate and rarely glaucoma and visual field defects . The patient was encouraged to drink a lot of fluids.     Methazolamide (Neptazane) 50 mg TID (100 mg BID) which can cause sedation, numbness, gastrointestinal upset and rarely kidney stones. Safety during pregnancy is unknown.    He is already on Metoprolol.         INTERVENTIONAL OPTIONS:      Neuravive, Gamma Knife and DBS.         MEDICAL/SURGICAL COMORBIDITIES     All relevant medical comorbidities noted and managed by primary care physician and medical care team.          MISCELLANEOUS MEDICAL PROBLEMS       HEALTHY LIFESTYLE AND PREVENTATIVE CARE    The patient to adhere to the age-appropriate health maintenance guidelines including screening tests and vaccinations. The patient to adhere to  healthy  lifestyle, optimal weight, exercise, healthy diet, good sleep hygiene and avoiding drugs including smoking, alcohol and recreational drugs.      I spent a total of 30 minutes on the day of the visit.  This includes face to face time and non-face to face time preparing to see the patient (eg, review of tests), obtaining and/or reviewing separately obtained history, documenting clinical information in the electronic or other health record, independently interpreting results and communicating results to the patient/family/caregiver, or care coordinator.        RTC in 6 months          Christelle Wilhelm, MSN, NP    Collaborating Provider: Geri Meeks MD, FAAN Neurologist/Epileptologist

## 2022-05-09 PROBLEM — Z00.00 ANNUAL PHYSICAL EXAM: Status: RESOLVED | Noted: 2018-04-24 | Resolved: 2022-05-09

## 2022-05-31 DIAGNOSIS — I10 ESSENTIAL HYPERTENSION: ICD-10-CM

## 2022-05-31 RX ORDER — LISINOPRIL 10 MG/1
10 TABLET ORAL DAILY
Qty: 90 TABLET | Refills: 3 | Status: SHIPPED | OUTPATIENT
Start: 2022-05-31 | End: 2023-03-13 | Stop reason: SDUPTHER

## 2022-05-31 NOTE — TELEPHONE ENCOUNTER
----- Message from Sarah Mcnamara sent at 5/31/2022  8:02 AM CDT -----  Type:  RX Refill Request    Who Called: patient  Refill or New Rx:refill  RX Name and Strength:Lisinopril 10mg   How is the patient currently taking it? (ex. 1XDay):1xday  Is this a 30 day or 90 day RX:90day  Preferred Pharmacy with phone number:[facility]  Local or Mail Order:mail  Ordering Provider:DR irizarry  Would the patient rather a call back or a response via [facility]? Call back  Best Call Back Number:[phone]  Additional Information: na

## 2022-06-14 ENCOUNTER — TELEPHONE (OUTPATIENT)
Dept: PRIMARY CARE CLINIC | Facility: CLINIC | Age: 72
End: 2022-06-14
Payer: MEDICARE
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
## 2022-07-17 DIAGNOSIS — E78.5 DYSLIPIDEMIA: ICD-10-CM

## 2022-07-17 NOTE — TELEPHONE ENCOUNTER
No new care gaps identified.  [facility] Embedded Care Gaps. Reference number: [id number]. 7/17/2022   7:45:50 AM CDT

## 2022-07-18 DIAGNOSIS — E78.5 DYSLIPIDEMIA: ICD-10-CM

## 2022-07-18 RX ORDER — ROSUVASTATIN CALCIUM 10 MG/1
TABLET, COATED ORAL
Qty: 90 TABLET | Refills: 2 | Status: SHIPPED | OUTPATIENT
Start: 2022-07-18 | End: 2022-08-09 | Stop reason: SDUPTHER

## 2022-07-18 NOTE — TELEPHONE ENCOUNTER
No new care gaps identified.  [facility] Embedded Care Gaps. Reference number: [id number]. 7/18/2022   4:57:10 PM CDT

## 2022-07-18 NOTE — TELEPHONE ENCOUNTER
----- Message from Palak Costa sent at 7/18/2022  4:51 PM CDT -----  Contact: self  Type:  RX Refill Request    Who Called: Ezio James   Refill or New Rx: refill   RX Name and Strength: (SYNTHROID) 88 MCG, rosuvastatin (CRESTOR) 10 MG    How is the patient currently taking it? (ex. 1XDay): 1x Daily  Is this a 30 day or 90 day RX: 90 day   Preferred Pharmacy with phone number:   [facility]  [address] [address]  Phone: [phone] Fax: [phone]  Local or Mail Order: local   Ordering Provider: Rohini   Would the patient rather a call back or a response via [facility]?  [facility]   Best Call Back Number: [phone]   Additional Information:

## 2022-07-18 NOTE — TELEPHONE ENCOUNTER
Refill Routing Note   Medication(s) are not appropriate for processing by [facility] for the following reason(s):      - Drug-Disease Interaction (levothyroxine and Dyslipidemia; Essential hypertension)    [facility] action(s):  Defer  Approve Medication-related problems identified: Drug-disease interaction        Medication reconciliation completed: No     Appointments  past 12m or future 3m with PCP    Date Provider   Last Visit   2/1/2022 Darren Nova MD   Next Visit   7/18/2022 Darren Nova MD   ED visits in past 90 days: 0        Note composed:6:29 PM 07/18/2022

## 2022-07-19 RX ORDER — ROSUVASTATIN CALCIUM 10 MG/1
10 TABLET, COATED ORAL NIGHTLY
Qty: 90 TABLET | Refills: 0 | Status: SHIPPED | OUTPATIENT
Start: 2022-07-19 | End: 2022-10-16

## 2022-07-19 RX ORDER — LEVOTHYROXINE SODIUM 88 UG/1
TABLET ORAL
Qty: 90 TABLET | Refills: 2 | OUTPATIENT
Start: 2022-07-19

## 2022-07-19 RX ORDER — LEVOTHYROXINE SODIUM 88 UG/1
88 TABLET ORAL
Qty: 90 TABLET | Refills: 1 | Status: SHIPPED | OUTPATIENT
Start: 2022-07-19 | End: 2023-01-17

## 2022-08-09 ENCOUNTER — OFFICE VISIT (OUTPATIENT)
Dept: INTERNAL MEDICINE | Facility: CLINIC | Age: 72
End: 2022-08-09
Payer: MEDICARE

## 2022-08-09 ENCOUNTER — LAB VISIT (OUTPATIENT)
Dept: LAB | Facility: HOSPITAL | Age: 72
End: 2022-08-09
Payer: MEDICARE

## 2022-08-09 VITALS
SYSTOLIC BLOOD PRESSURE: 112 MMHG | TEMPERATURE: 99 F | BODY MASS INDEX: 27.28 KG/M2 | HEART RATE: 92 BPM | WEIGHT: 190.56 LBS | OXYGEN SATURATION: 97 % | HEIGHT: 70 IN | DIASTOLIC BLOOD PRESSURE: 64 MMHG

## 2022-08-09 DIAGNOSIS — I50.42 CHRONIC COMBINED SYSTOLIC AND DIASTOLIC CONGESTIVE HEART FAILURE: ICD-10-CM

## 2022-08-09 DIAGNOSIS — Z79.899 OTHER LONG TERM (CURRENT) DRUG THERAPY: ICD-10-CM

## 2022-08-09 DIAGNOSIS — G25.0 BENIGN ESSENTIAL TREMOR: ICD-10-CM

## 2022-08-09 DIAGNOSIS — E03.9 HYPOTHYROIDISM, UNSPECIFIED TYPE: ICD-10-CM

## 2022-08-09 DIAGNOSIS — I48.20 ATRIAL FIBRILLATION, CHRONIC: ICD-10-CM

## 2022-08-09 DIAGNOSIS — I34.0 NONRHEUMATIC MITRAL VALVE REGURGITATION: ICD-10-CM

## 2022-08-09 DIAGNOSIS — R53.83 FATIGUE, UNSPECIFIED TYPE: ICD-10-CM

## 2022-08-09 DIAGNOSIS — E78.5 DYSLIPIDEMIA: ICD-10-CM

## 2022-08-09 DIAGNOSIS — I10 ESSENTIAL HYPERTENSION: ICD-10-CM

## 2022-08-09 DIAGNOSIS — I42.8 NON-ISCHEMIC CARDIOMYOPATHY: ICD-10-CM

## 2022-08-09 DIAGNOSIS — R53.83 FATIGUE, UNSPECIFIED TYPE: Primary | ICD-10-CM

## 2022-08-09 DIAGNOSIS — H91.90 HEARING LOSS, UNSPECIFIED HEARING LOSS TYPE, UNSPECIFIED LATERALITY: ICD-10-CM

## 2022-08-09 LAB
BASOPHILS # BLD AUTO: 0.04 K/UL (ref 0–0.2)
BASOPHILS NFR BLD: 0.7 % (ref 0–1.9)
DIFFERENTIAL METHOD: NORMAL
EOSINOPHIL # BLD AUTO: 0.1 K/UL (ref 0–0.5)
EOSINOPHIL NFR BLD: 1.9 % (ref 0–8)
ERYTHROCYTE [DISTWIDTH] IN BLOOD BY AUTOMATED COUNT: 12.1 % (ref 11.5–14.5)
HCT VFR BLD AUTO: 45.1 % (ref 40–54)
HGB BLD-MCNC: 14.5 G/DL (ref 14–18)
IMM GRANULOCYTES # BLD AUTO: 0.01 K/UL (ref 0–0.04)
IMM GRANULOCYTES NFR BLD AUTO: 0.2 % (ref 0–0.5)
LYMPHOCYTES # BLD AUTO: 1.3 K/UL (ref 1–4.8)
LYMPHOCYTES NFR BLD: 24.5 % (ref 18–48)
MCH RBC QN AUTO: 30.6 PG (ref 27–31)
MCHC RBC AUTO-ENTMCNC: 32.2 G/DL (ref 32–36)
MCV RBC AUTO: 95 FL (ref 82–98)
MONOCYTES # BLD AUTO: 0.3 K/UL (ref 0.3–1)
MONOCYTES NFR BLD: 6.4 % (ref 4–15)
NEUTROPHILS # BLD AUTO: 3.5 K/UL (ref 1.8–7.7)
NEUTROPHILS NFR BLD: 66.3 % (ref 38–73)
NRBC BLD-RTO: 0 /100 WBC
PLATELET # BLD AUTO: 241 K/UL (ref 150–450)
PMV BLD AUTO: 10.9 FL (ref 9.2–12.9)
RBC # BLD AUTO: 4.74 M/UL (ref 4.6–6.2)
TESTOST SERPL-MCNC: 477 NG/DL (ref 304–1227)
TSH SERPL DL<=0.005 MIU/L-ACNC: 2.81 UIU/ML (ref 0.4–4)
VIT B12 SERPL-MCNC: 1075 PG/ML (ref 210–950)
WBC # BLD AUTO: 5.34 K/UL (ref 3.9–12.7)

## 2022-08-09 PROCEDURE — 1159F MED LIST DOCD IN RCRD: CPT | Mod: CPTII,S$GLB,,

## 2022-08-09 PROCEDURE — 3288F FALL RISK ASSESSMENT DOCD: CPT | Mod: CPTII,S$GLB,,

## 2022-08-09 PROCEDURE — 1160F PR REVIEW ALL MEDS BY PRESCRIBER/CLIN PHARMACIST DOCUMENTED: ICD-10-PCS | Mod: CPTII,S$GLB,,

## 2022-08-09 PROCEDURE — 99214 PR OFFICE/OUTPT VISIT, EST, LEVL IV, 30-39 MIN: ICD-10-PCS | Mod: S$GLB,,,

## 2022-08-09 PROCEDURE — 4010F ACE/ARB THERAPY RXD/TAKEN: CPT | Mod: CPTII,S$GLB,,

## 2022-08-09 PROCEDURE — 1101F PT FALLS ASSESS-DOCD LE1/YR: CPT | Mod: CPTII,S$GLB,,

## 2022-08-09 PROCEDURE — 1126F PR PAIN SEVERITY QUANTIFIED, NO PAIN PRESENT: ICD-10-PCS | Mod: CPTII,S$GLB,,

## 2022-08-09 PROCEDURE — 84403 ASSAY OF TOTAL TESTOSTERONE: CPT

## 2022-08-09 PROCEDURE — 99999 PR PBB SHADOW E&M-EST. PATIENT-LVL IV: CPT | Mod: PBBFAC,,,

## 2022-08-09 PROCEDURE — 99214 OFFICE O/P EST MOD 30 MIN: CPT | Mod: S$GLB,,,

## 2022-08-09 PROCEDURE — 3288F PR FALLS RISK ASSESSMENT DOCUMENTED: ICD-10-PCS | Mod: CPTII,S$GLB,,

## 2022-08-09 PROCEDURE — 82607 VITAMIN B-12: CPT

## 2022-08-09 PROCEDURE — 3008F BODY MASS INDEX DOCD: CPT | Mod: CPTII,S$GLB,,

## 2022-08-09 PROCEDURE — 36415 COLL VENOUS BLD VENIPUNCTURE: CPT

## 2022-08-09 PROCEDURE — 3074F SYST BP LT 130 MM HG: CPT | Mod: CPTII,S$GLB,,

## 2022-08-09 PROCEDURE — 1160F RVW MEDS BY RX/DR IN RCRD: CPT | Mod: CPTII,S$GLB,,

## 2022-08-09 PROCEDURE — 99999 PR PBB SHADOW E&M-EST. PATIENT-LVL IV: ICD-10-PCS | Mod: PBBFAC,,,

## 2022-08-09 PROCEDURE — 84443 ASSAY THYROID STIM HORMONE: CPT

## 2022-08-09 PROCEDURE — 3078F DIAST BP <80 MM HG: CPT | Mod: CPTII,S$GLB,,

## 2022-08-09 PROCEDURE — 1101F PR PT FALLS ASSESS DOC 0-1 FALLS W/OUT INJ PAST YR: ICD-10-PCS | Mod: CPTII,S$GLB,,

## 2022-08-09 PROCEDURE — 3078F PR MOST RECENT DIASTOLIC BLOOD PRESSURE < 80 MM HG: ICD-10-PCS | Mod: CPTII,S$GLB,,

## 2022-08-09 PROCEDURE — 85025 COMPLETE CBC W/AUTO DIFF WBC: CPT

## 2022-08-09 PROCEDURE — 1126F AMNT PAIN NOTED NONE PRSNT: CPT | Mod: CPTII,S$GLB,,

## 2022-08-09 PROCEDURE — 3008F PR BODY MASS INDEX (BMI) DOCUMENTED: ICD-10-PCS | Mod: CPTII,S$GLB,,

## 2022-08-09 PROCEDURE — 3074F PR MOST RECENT SYSTOLIC BLOOD PRESSURE < 130 MM HG: ICD-10-PCS | Mod: CPTII,S$GLB,,

## 2022-08-09 PROCEDURE — 1159F PR MEDICATION LIST DOCUMENTED IN MEDICAL RECORD: ICD-10-PCS | Mod: CPTII,S$GLB,,

## 2022-08-09 PROCEDURE — 4010F PR ACE/ARB THEARPY RXD/TAKEN: ICD-10-PCS | Mod: CPTII,S$GLB,,

## 2022-08-09 NOTE — PROGRESS NOTES
Ezio James  08/09/2022  [id number]    Darren Nova MD  Patient Care Team:  Darren Nova MD as PCP - General (Family Medicine)  STERLING Coffman Sr., MD (Gastroenterology)          Visit Type:an urgent visit for a new problem    Chief Complaint:  Chief Complaint   Patient presents with    Follow-up     Thyroid check    Fatigue       History of Present Illness:    Lately he has been fatigued  Sleeps about 5-6 hours a night  Takes daytime naps sometimes    He takes melatonin if he feels restless  It does help him sleep     He has been told that he snores  No SOB,CP, leg swelling     History:  Past Medical History:   Diagnosis Date    Atrial fibrillation, chronic     Chronic systolic congestive heart failure 5/4/2018    Colon polyp 04,15    Essential hypertension 6/14/2017    Subclinical hypothyroidism      Past Surgical History:   Procedure Laterality Date    COLONOSCOPY  2004,5/15    with polyps per Chart    LEFT HEART CATHETERIZATION Left 6/21/2018    Procedure: HEART CATH-LEFT;  Surgeon: Leon James MD;  Location: [facility] CATH LAB;  Service: Cardiology;  Laterality: Left;  7:30 start time  right radial approach     Family History   Problem Relation Age of Onset    Lung cancer Mother     Hypertension Mother     Obesity Mother     Cataracts Mother     Emphysema Father     Heart disease Father      Social History     Socioeconomic History    Marital status:    Tobacco Use    Smoking status: Never Smoker    Smokeless tobacco: Never Used   Substance and Sexual Activity    Alcohol use: Yes     Comment: Rarely    Drug use: No    Sexual activity: Yes     Partners: Female     Birth control/protection: None     Patient Active Problem List   Diagnosis    Subclinical hypothyroidism    Atrial fibrillation, chronic    Cardiomyopathy    Hypothyroidism    Hematuria    Paresthesia of both feet    Immunization deficiency    Tremor    Arthritis of knee    Low HDL (under 40)     Essential hypertension    LVH (left ventricular hypertrophy)    Dyslipidemia    Edema    Arthralgia    Non-ischemic cardiomyopathy    Abnormal ECG    History of colon polyps    B12 deficiency    Chronic combined systolic and diastolic congestive heart failure    LAE (left atrial enlargement)    Overweight (BMI 25.0-29.9)    Corn of foot    Hearing loss    Benign essential tremor    Nonrheumatic mitral valve regurgitation     Review of patient's allergies indicates:  No Known Allergies    The following were reviewed at this visit: active problem list, medication list, allergies, family history, social history, and health maintenance.    Medications:  Current Outpatient Medications on File Prior to Visit   Medication Sig Dispense Refill    aspirin (ECOTRIN) 81 MG EC tablet Take 1 tablet (81 mg total) by mouth once daily. 90 tablet 2    cholecalciferol, vitamin D3, 125 mcg (5,000 unit) Tab Take 5,000 Units by mouth once daily.      cyanocobalamin (VITAMIN B-12) 1000 MCG tablet Take 100 mcg by mouth once daily.      levothyroxine (SYNTHROID) 88 MCG tablet Take 1 tablet (88 mcg total) by mouth before breakfast. 90 tablet 1    lisinopriL 10 MG tablet Take 1 tablet (10 mg total) by mouth once daily. 90 tablet 3    lisinopriL 10 MG tablet TAKE 1 TABLET DAILY 90 tablet 3    metoprolol succinate (TOPROL-XL) 50 MG 24 hr tablet TAKE 1 TABLET DAILY 90 tablet 3    multivitamin capsule Take 1 capsule by mouth once daily.      primidone (MYSOLINE) 50 MG Tab Take 1 tablet (50 mg total) by mouth every evening. 90 tablet 3    rosuvastatin (CRESTOR) 10 MG tablet TAKE 1 TABLET EVERY EVENING 90 tablet 2    rosuvastatin (CRESTOR) 10 MG tablet Take 1 tablet (10 mg total) by mouth every evening. 90 tablet 0     No current facility-administered medications on file prior to visit.       Medications have been reviewed and reconciled with patient at this visit.  Barriers to medications reviewed with patient.    Adverse  reactions to current medications reviewed with patient..    Over the counter medications reviewed and reconciled with patient.    Exam:  Wt Readings from Last 3 Encounters:   03/07/22 87.2 kg (192 lb 3.9 oz)   02/09/22 90.4 kg (199 lb 4.7 oz)   02/07/22 88.2 kg (194 lb 7.1 oz)     Temp Readings from Last 3 Encounters:   02/01/22 97.8 °F (36.6 °C) (Tympanic)   07/09/21 98.3 °F (36.8 °C) (Oral)   02/18/21 97.6 °F (36.4 °C) (Tympanic)     BP Readings from Last 3 Encounters:   03/07/22 110/62   02/09/22 118/80   02/07/22 100/80     Pulse Readings from Last 3 Encounters:   02/09/22 90   02/07/22 88   02/03/22 75     There is no height or weight on file to calculate BMI.      Review of Systems   Constitutional: Positive for malaise/fatigue.   Respiratory: Negative for cough, sputum production and wheezing.    Cardiovascular: Negative for chest pain and palpitations.   Neurological: Positive for tremors.   Psychiatric/Behavioral: The patient has insomnia.      Physical Exam  Vitals and nursing note reviewed.   Constitutional:       General: He is not in acute distress.     Appearance: He is well-developed. He is not diaphoretic.   HENT:      Head: Normocephalic and atraumatic.      Right Ear: External ear normal.      Left Ear: External ear normal.   Eyes:      General:         Right eye: No discharge.         Left eye: No discharge.      Conjunctiva/sclera: Conjunctivae normal.      Pupils: Pupils are equal, round, and reactive to light.   Cardiovascular:      Rate and Rhythm: Normal rate and regular rhythm.      Heart sounds: Normal heart sounds. No murmur heard.  Pulmonary:      Effort: Pulmonary effort is normal. No respiratory distress.      Breath sounds: Normal breath sounds. No wheezing.   Abdominal:      General: Bowel sounds are normal.   Neurological:      Mental Status: He is alert and oriented to person, place, and time.      Cranial Nerves: No cranial nerve deficit.   Psychiatric:         Behavior: Behavior  normal.         Thought Content: Thought content normal.         Judgment: Judgment normal.         Laboratory Reviewed ({Yes)  Lab Results   Component Value Date    WBC 6.78 02/01/2022    HGB 14.7 02/01/2022    HCT 45.5 02/01/2022     02/01/2022    CHOL 124 02/01/2022    TRIG 115 02/01/2022    HDL 40 02/01/2022    ALT 17 02/01/2022    AST 24 02/01/2022     02/01/2022    K 5.0 02/01/2022     02/01/2022    CREATININE 0.8 02/01/2022    BUN 16 02/01/2022    CO2 29 02/01/2022    TSH 6.052 (H) 02/01/2022    PSA 0.21 02/18/2021    INR 1.0 06/14/2018       Ezio was seen today for follow-up and fatigue.    Diagnoses and all orders for this visit:    Fatigue, unspecified type  -     TSH; Future  -     Testosterone; Future  -     CBC Auto Differential; Future  -     VITAMIN B12; Future    Essential hypertension  At goal during visit     Atrial fibrillation, chronic  -     VITAMIN B12; Future    Chronic combined systolic and diastolic congestive heart failure  -     VITAMIN B12; Future    Dyslipidemia  Currently stable on medication. CONT POC     Nonrheumatic mitral valve regurgitation  Continue current treatment plan as previously prescribed with your cardiologist     Non-ischemic cardiomyopathy  Continue current treatment plan as previously prescribed with your cardiologist     Benign essential tremor  -     CBC Auto Differential; Future  -     VITAMIN B12; Future  Being followed by Neuro.    Hypothyroidism, unspecified type  -     TSH; Future  -     VITAMIN B12; Future    Other long term (current) drug therapy   -     VITAMIN B12; Future    Hearing loss, unspecified hearing loss type, unspecified laterality  Needs hearing Aids. He has not scheduled a follow up visit with audiology       Will get labs to check for any abnormalities causing the fatigue  Not interested in sleep study at this time  Cont to use melatonin as needed for sleep     Appt in 6 months with PCP      Care Plan/Goals: Reviewed    Goals     None         Follow up: No follow-ups on file.    After visit summary was printed and given to patient upon discharge today.  Patient goals and care plan are included in After Visit Summary.

## 2022-08-10 ENCOUNTER — PATIENT MESSAGE (OUTPATIENT)
Dept: INTERNAL MEDICINE | Facility: CLINIC | Age: 72
End: 2022-08-10
Payer: MEDICARE

## 2022-08-11 ENCOUNTER — PATIENT MESSAGE (OUTPATIENT)
Dept: INTERNAL MEDICINE | Facility: CLINIC | Age: 72
End: 2022-08-11
Payer: MEDICARE

## 2022-08-16 ENCOUNTER — OFFICE VISIT (OUTPATIENT)
Dept: INTERNAL MEDICINE | Facility: CLINIC | Age: 72
End: 2022-08-16
Payer: MEDICARE

## 2022-08-16 ENCOUNTER — HOSPITAL ENCOUNTER (OUTPATIENT)
Dept: RADIOLOGY | Facility: HOSPITAL | Age: 72
Discharge: HOME | End: 2022-08-16
Attending: FAMILY MEDICINE
Payer: MEDICARE

## 2022-08-16 VITALS
HEIGHT: 70 IN | TEMPERATURE: 98 F | HEART RATE: 80 BPM | OXYGEN SATURATION: 97 % | BODY MASS INDEX: 27.49 KG/M2 | DIASTOLIC BLOOD PRESSURE: 64 MMHG | WEIGHT: 192 LBS | SYSTOLIC BLOOD PRESSURE: 128 MMHG

## 2022-08-16 DIAGNOSIS — M25.572 ACUTE LEFT ANKLE PAIN: ICD-10-CM

## 2022-08-16 DIAGNOSIS — M25.571 ACUTE RIGHT ANKLE PAIN: ICD-10-CM

## 2022-08-16 PROCEDURE — 3288F PR FALLS RISK ASSESSMENT DOCUMENTED: ICD-10-PCS | Mod: CPTII,S$GLB,, | Performed by: FAMILY MEDICINE

## 2022-08-16 PROCEDURE — 1101F PT FALLS ASSESS-DOCD LE1/YR: CPT | Mod: CPTII,S$GLB,, | Performed by: FAMILY MEDICINE

## 2022-08-16 PROCEDURE — 73610 XR ANKLE COMPLETE 3 VIEW RIGHT: ICD-10-PCS | Mod: 26,RT,, | Performed by: RADIOLOGY

## 2022-08-16 PROCEDURE — 4010F ACE/ARB THERAPY RXD/TAKEN: CPT | Mod: CPTII,S$GLB,, | Performed by: FAMILY MEDICINE

## 2022-08-16 PROCEDURE — 99213 PR OFFICE/OUTPT VISIT, EST, LEVL III, 20-29 MIN: ICD-10-PCS | Mod: S$GLB,,, | Performed by: FAMILY MEDICINE

## 2022-08-16 PROCEDURE — 3074F SYST BP LT 130 MM HG: CPT | Mod: CPTII,S$GLB,, | Performed by: FAMILY MEDICINE

## 2022-08-16 PROCEDURE — 1101F PR PT FALLS ASSESS DOC 0-1 FALLS W/OUT INJ PAST YR: ICD-10-PCS | Mod: CPTII,S$GLB,, | Performed by: FAMILY MEDICINE

## 2022-08-16 PROCEDURE — 73610 X-RAY EXAM OF ANKLE: CPT | Mod: 26,RT,, | Performed by: RADIOLOGY

## 2022-08-16 PROCEDURE — 73610 X-RAY EXAM OF ANKLE: CPT | Mod: TC,RT

## 2022-08-16 PROCEDURE — 1125F AMNT PAIN NOTED PAIN PRSNT: CPT | Mod: CPTII,S$GLB,, | Performed by: FAMILY MEDICINE

## 2022-08-16 PROCEDURE — 3078F DIAST BP <80 MM HG: CPT | Mod: CPTII,S$GLB,, | Performed by: FAMILY MEDICINE

## 2022-08-16 PROCEDURE — 99999 PR PBB SHADOW E&M-EST. PATIENT-LVL IV: ICD-10-PCS | Mod: PBBFAC,,, | Performed by: FAMILY MEDICINE

## 2022-08-16 PROCEDURE — 3008F BODY MASS INDEX DOCD: CPT | Mod: CPTII,S$GLB,, | Performed by: FAMILY MEDICINE

## 2022-08-16 PROCEDURE — 1125F PR PAIN SEVERITY QUANTIFIED, PAIN PRESENT: ICD-10-PCS | Mod: CPTII,S$GLB,, | Performed by: FAMILY MEDICINE

## 2022-08-16 PROCEDURE — 3288F FALL RISK ASSESSMENT DOCD: CPT | Mod: CPTII,S$GLB,, | Performed by: FAMILY MEDICINE

## 2022-08-16 PROCEDURE — 3008F PR BODY MASS INDEX (BMI) DOCUMENTED: ICD-10-PCS | Mod: CPTII,S$GLB,, | Performed by: FAMILY MEDICINE

## 2022-08-16 PROCEDURE — 99999 PR PBB SHADOW E&M-EST. PATIENT-LVL IV: CPT | Mod: PBBFAC,,, | Performed by: FAMILY MEDICINE

## 2022-08-16 PROCEDURE — 99213 OFFICE O/P EST LOW 20 MIN: CPT | Mod: S$GLB,,, | Performed by: FAMILY MEDICINE

## 2022-08-16 PROCEDURE — 3074F PR MOST RECENT SYSTOLIC BLOOD PRESSURE < 130 MM HG: ICD-10-PCS | Mod: CPTII,S$GLB,, | Performed by: FAMILY MEDICINE

## 2022-08-16 PROCEDURE — 3078F PR MOST RECENT DIASTOLIC BLOOD PRESSURE < 80 MM HG: ICD-10-PCS | Mod: CPTII,S$GLB,, | Performed by: FAMILY MEDICINE

## 2022-08-16 PROCEDURE — 4010F PR ACE/ARB THEARPY RXD/TAKEN: ICD-10-PCS | Mod: CPTII,S$GLB,, | Performed by: FAMILY MEDICINE

## 2022-08-16 NOTE — PROGRESS NOTES
Subjective:       Patient ID: Ezio James is a 71 y.o. male.    Chief Complaint: Ankle Pain (right)    HPI    Patient Active Problem List   Diagnosis    Subclinical hypothyroidism    Atrial fibrillation, chronic    Cardiomyopathy    Hypothyroidism    Hematuria    Paresthesia of both feet    Immunization deficiency    Tremor    Arthritis of knee    Low HDL (under 40)    Essential hypertension    LVH (left ventricular hypertrophy)    Dyslipidemia    Edema    Arthralgia    Non-ischemic cardiomyopathy    Abnormal ECG    History of colon polyps    B12 deficiency    Chronic combined systolic and diastolic congestive heart failure    LAE (left atrial enlargement)    Overweight (BMI 25.0-29.9)    Corn of foot    Hearing loss    Benign essential tremor    Nonrheumatic mitral valve regurgitation       Past Medical History:   Diagnosis Date    Atrial fibrillation, chronic     Chronic systolic congestive heart failure 5/4/2018    Colon polyp 04,15    Essential hypertension 6/14/2017    Subclinical hypothyroidism        Past Surgical History:   Procedure Laterality Date    COLONOSCOPY  2004,5/15    with polyps per Chart    LEFT HEART CATHETERIZATION Left 6/21/2018    Procedure: HEART CATH-LEFT;  Surgeon: Leon James MD;  Location: [facility] CATH LAB;  Service: Cardiology;  Laterality: Left;  7:30 start time  right radial approach       Family History   Problem Relation Age of Onset    Lung cancer Mother     Hypertension Mother     Obesity Mother     Cataracts Mother     Emphysema Father     Heart disease Father        Social History     Tobacco Use   Smoking Status Never Smoker   Smokeless Tobacco Never Used       Wt Readings from Last 5 Encounters:   08/16/22 87.1 kg (192 lb 0.3 oz)   08/09/22 86.4 kg (190 lb 9.4 oz)   03/07/22 87.2 kg (192 lb 3.9 oz)   02/09/22 90.4 kg (199 lb 4.7 oz)   02/07/22 88.2 kg (194 lb 7.1 oz)       For further HPI details, see assessment and plan.    Review of  Systems    Objective:      Vitals:    08/16/22 0955   BP: 128/64   Pulse: 80   Temp: 98.4 °F (36.9 °C)       Physical Exam  Constitutional:       General: He is not in acute distress.     Appearance: He is not ill-appearing.   Pulmonary:      Effort: Pulmonary effort is normal. No respiratory distress.   Musculoskeletal:      Comments: Diffuse ankle swelling  Lateral tenderness   Neurological:      General: No focal deficit present.      Mental Status: He is alert.   Psychiatric:         Mood and Affect: Mood normal.         Behavior: Behavior normal.         Assessment:       1. Acute left ankle pain        Plan:   Acute left ankle pain  -     X-Ray Ankle Complete 3 View Left; Future; Expected date: 08/16/2022        Ankle pain  Friday night ( today is Tues) - slippery step in old shoes - slid off a step - ended up twisting.  Feels its gradually improving    Pain w/ pivoting    And when applies pressure on ball of his foot.    significant swelling  Soaking it in ice water  - diffuse pain - tenderness along lateral malleolus    Continue aleve PRN    Xray  Podiatry

## 2022-08-17 ENCOUNTER — PATIENT MESSAGE (OUTPATIENT)
Dept: INTERNAL MEDICINE | Facility: CLINIC | Age: 72
End: 2022-08-17
Payer: MEDICARE

## 2022-08-17 DIAGNOSIS — M25.571 ACUTE RIGHT ANKLE PAIN: Primary | ICD-10-CM

## 2022-08-18 ENCOUNTER — HOSPITAL ENCOUNTER (OUTPATIENT)
Dept: RADIOLOGY | Facility: HOSPITAL | Age: 72
Discharge: HOME | End: 2022-08-18
Attending: PODIATRIST
Payer: MEDICARE

## 2022-08-18 ENCOUNTER — HOSPITAL ENCOUNTER (OUTPATIENT)
Dept: CARDIOLOGY | Facility: HOSPITAL | Age: 72
Discharge: HOME | End: 2022-08-18
Attending: INTERNAL MEDICINE
Payer: MEDICARE

## 2022-08-18 ENCOUNTER — OFFICE VISIT (OUTPATIENT)
Dept: PODIATRY | Facility: CLINIC | Age: 72
End: 2022-08-18
Payer: MEDICARE

## 2022-08-18 ENCOUNTER — OFFICE VISIT (OUTPATIENT)
Dept: CARDIOLOGY | Facility: CLINIC | Age: 72
End: 2022-08-18
Payer: MEDICARE

## 2022-08-18 VITALS
WEIGHT: 196 LBS | HEIGHT: 70 IN | SYSTOLIC BLOOD PRESSURE: 124 MMHG | HEART RATE: 56 BPM | BODY MASS INDEX: 28.06 KG/M2 | OXYGEN SATURATION: 99 % | DIASTOLIC BLOOD PRESSURE: 84 MMHG

## 2022-08-18 DIAGNOSIS — I10 ESSENTIAL HYPERTENSION: Primary | ICD-10-CM

## 2022-08-18 DIAGNOSIS — I48.20 ATRIAL FIBRILLATION, CHRONIC: ICD-10-CM

## 2022-08-18 DIAGNOSIS — Z01.810 PREOP CARDIOVASCULAR EXAM: Primary | ICD-10-CM

## 2022-08-18 DIAGNOSIS — I50.42 CHRONIC COMBINED SYSTOLIC AND DIASTOLIC CONGESTIVE HEART FAILURE: ICD-10-CM

## 2022-08-18 DIAGNOSIS — I10 ESSENTIAL HYPERTENSION: ICD-10-CM

## 2022-08-18 DIAGNOSIS — Z01.810 PREOP CARDIOVASCULAR EXAM: ICD-10-CM

## 2022-08-18 DIAGNOSIS — M25.571 ACUTE RIGHT ANKLE PAIN: ICD-10-CM

## 2022-08-18 DIAGNOSIS — I34.0 NONRHEUMATIC MITRAL VALVE REGURGITATION: ICD-10-CM

## 2022-08-18 DIAGNOSIS — S82.891A CLOSED FRACTURE OF RIGHT ANKLE, INITIAL ENCOUNTER: Primary | ICD-10-CM

## 2022-08-18 PROCEDURE — 1159F PR MEDICATION LIST DOCUMENTED IN MEDICAL RECORD: ICD-10-PCS | Mod: CPTII,S$GLB,, | Performed by: PODIATRIST

## 2022-08-18 PROCEDURE — 99999 PR PBB SHADOW E&M-EST. PATIENT-LVL III: CPT | Mod: PBBFAC,,, | Performed by: PODIATRIST

## 2022-08-18 PROCEDURE — 1125F PR PAIN SEVERITY QUANTIFIED, PAIN PRESENT: ICD-10-PCS | Mod: CPTII,S$GLB,, | Performed by: INTERNAL MEDICINE

## 2022-08-18 PROCEDURE — 93010 ELECTROCARDIOGRAM REPORT: CPT | Mod: ,,, | Performed by: INTERNAL MEDICINE

## 2022-08-18 PROCEDURE — 3074F SYST BP LT 130 MM HG: CPT | Mod: CPTII,S$GLB,, | Performed by: INTERNAL MEDICINE

## 2022-08-18 PROCEDURE — 1159F PR MEDICATION LIST DOCUMENTED IN MEDICAL RECORD: ICD-10-PCS | Mod: CPTII,S$GLB,, | Performed by: INTERNAL MEDICINE

## 2022-08-18 PROCEDURE — 3008F BODY MASS INDEX DOCD: CPT | Mod: CPTII,S$GLB,, | Performed by: INTERNAL MEDICINE

## 2022-08-18 PROCEDURE — 4010F ACE/ARB THERAPY RXD/TAKEN: CPT | Mod: CPTII,S$GLB,, | Performed by: INTERNAL MEDICINE

## 2022-08-18 PROCEDURE — 1159F MED LIST DOCD IN RCRD: CPT | Mod: CPTII,S$GLB,, | Performed by: PODIATRIST

## 2022-08-18 PROCEDURE — 1160F RVW MEDS BY RX/DR IN RCRD: CPT | Mod: CPTII,S$GLB,, | Performed by: INTERNAL MEDICINE

## 2022-08-18 PROCEDURE — 4010F PR ACE/ARB THEARPY RXD/TAKEN: ICD-10-PCS | Mod: CPTII,S$GLB,, | Performed by: PODIATRIST

## 2022-08-18 PROCEDURE — 1160F PR REVIEW ALL MEDS BY PRESCRIBER/CLIN PHARMACIST DOCUMENTED: ICD-10-PCS | Mod: CPTII,S$GLB,, | Performed by: INTERNAL MEDICINE

## 2022-08-18 PROCEDURE — 1125F AMNT PAIN NOTED PAIN PRSNT: CPT | Mod: CPTII,S$GLB,, | Performed by: INTERNAL MEDICINE

## 2022-08-18 PROCEDURE — 1100F PTFALLS ASSESS-DOCD GE2>/YR: CPT | Mod: CPTII,S$GLB,, | Performed by: INTERNAL MEDICINE

## 2022-08-18 PROCEDURE — 3008F PR BODY MASS INDEX (BMI) DOCUMENTED: ICD-10-PCS | Mod: CPTII,S$GLB,, | Performed by: INTERNAL MEDICINE

## 2022-08-18 PROCEDURE — 71045 XR CHEST 1 VIEW PRE-OP: ICD-10-PCS | Mod: 26,,, | Performed by: RADIOLOGY

## 2022-08-18 PROCEDURE — 71045 X-RAY EXAM CHEST 1 VIEW: CPT | Mod: 26,,, | Performed by: RADIOLOGY

## 2022-08-18 PROCEDURE — 93010 EKG 12-LEAD: ICD-10-PCS | Mod: ,,, | Performed by: INTERNAL MEDICINE

## 2022-08-18 PROCEDURE — 3288F FALL RISK ASSESSMENT DOCD: CPT | Mod: CPTII,S$GLB,, | Performed by: INTERNAL MEDICINE

## 2022-08-18 PROCEDURE — 99999 PR PBB SHADOW E&M-EST. PATIENT-LVL III: CPT | Mod: PBBFAC,,, | Performed by: INTERNAL MEDICINE

## 2022-08-18 PROCEDURE — 4010F ACE/ARB THERAPY RXD/TAKEN: CPT | Mod: CPTII,S$GLB,, | Performed by: PODIATRIST

## 2022-08-18 PROCEDURE — 3288F PR FALLS RISK ASSESSMENT DOCUMENTED: ICD-10-PCS | Mod: CPTII,S$GLB,, | Performed by: INTERNAL MEDICINE

## 2022-08-18 PROCEDURE — 4010F PR ACE/ARB THEARPY RXD/TAKEN: ICD-10-PCS | Mod: CPTII,S$GLB,, | Performed by: INTERNAL MEDICINE

## 2022-08-18 PROCEDURE — 1100F PR PT FALLS ASSESS DOC 2+ FALLS/FALL W/INJURY/YR: ICD-10-PCS | Mod: CPTII,S$GLB,, | Performed by: INTERNAL MEDICINE

## 2022-08-18 PROCEDURE — 71045 X-RAY EXAM CHEST 1 VIEW: CPT | Mod: TC

## 2022-08-18 PROCEDURE — 99215 PR OFFICE/OUTPT VISIT, EST, LEVL V, 40-54 MIN: ICD-10-PCS | Mod: S$GLB,,, | Performed by: INTERNAL MEDICINE

## 2022-08-18 PROCEDURE — 99999 PR PBB SHADOW E&M-EST. PATIENT-LVL III: ICD-10-PCS | Mod: PBBFAC,,, | Performed by: PODIATRIST

## 2022-08-18 PROCEDURE — 93005 ELECTROCARDIOGRAM TRACING: CPT

## 2022-08-18 PROCEDURE — 99204 OFFICE O/P NEW MOD 45 MIN: CPT | Mod: S$GLB,,, | Performed by: PODIATRIST

## 2022-08-18 PROCEDURE — 1160F PR REVIEW ALL MEDS BY PRESCRIBER/CLIN PHARMACIST DOCUMENTED: ICD-10-PCS | Mod: CPTII,S$GLB,, | Performed by: PODIATRIST

## 2022-08-18 PROCEDURE — 99215 OFFICE O/P EST HI 40 MIN: CPT | Mod: S$GLB,,, | Performed by: INTERNAL MEDICINE

## 2022-08-18 PROCEDURE — 1160F RVW MEDS BY RX/DR IN RCRD: CPT | Mod: CPTII,S$GLB,, | Performed by: PODIATRIST

## 2022-08-18 PROCEDURE — 3079F DIAST BP 80-89 MM HG: CPT | Mod: CPTII,S$GLB,, | Performed by: INTERNAL MEDICINE

## 2022-08-18 PROCEDURE — 1159F MED LIST DOCD IN RCRD: CPT | Mod: CPTII,S$GLB,, | Performed by: INTERNAL MEDICINE

## 2022-08-18 PROCEDURE — 3074F PR MOST RECENT SYSTOLIC BLOOD PRESSURE < 130 MM HG: ICD-10-PCS | Mod: CPTII,S$GLB,, | Performed by: INTERNAL MEDICINE

## 2022-08-18 PROCEDURE — 99999 PR PBB SHADOW E&M-EST. PATIENT-LVL III: ICD-10-PCS | Mod: PBBFAC,,, | Performed by: INTERNAL MEDICINE

## 2022-08-18 PROCEDURE — 99204 PR OFFICE/OUTPT VISIT, NEW, LEVL IV, 45-59 MIN: ICD-10-PCS | Mod: S$GLB,,, | Performed by: PODIATRIST

## 2022-08-18 PROCEDURE — 3079F PR MOST RECENT DIASTOLIC BLOOD PRESSURE 80-89 MM HG: ICD-10-PCS | Mod: CPTII,S$GLB,, | Performed by: INTERNAL MEDICINE

## 2022-08-18 NOTE — PROGRESS NOTES
Subjective:   Patient ID:  Ezio James is a 71 y.o. male who presents for cardiac consult of No chief complaint on file.      HPI  The patient came in today for cardiac consult of No chief complaint on file.      Ezio James is a 71 y.o. male pt with HTN, hyperlipidemia, CAD, LAE, MR, CHF, LVH, cardiomyopathy, permanent a fib here for preop CV eval.     2/9/22  HPI Pt presents for eval.   His current med conditions include  HTN, hyperlipidemia, CAD, LAE, MR, CHF, LVH, cardiomyopathy, permanent a fib.   Nonsmoker.   Past hx pertinent for following:  He declined anticoagulation multiple times in past (coumadin and NOAC discussed), wanted to stay on asa.   2014 echo 40 - 45%.  Stress MPI 2014 EF 35%.  S/p C 6/18 for abnl stress test and to more definitively assess LVEF.  Cath showed widely patent coronary arteries (only luminal irregularities) and LVEF 50 - 55%  ecg 7/26/19 a fib with controlled VR, low precordial R waves.   Has declined NOAC many times for his a fib in past (see prior notes).  ecg 1/29/21 A fib with controlled VR, low precordial R waves.  There are no acute changes.  Now here.  No angina.   No CHF sxs.  HTN is controlled on current med tx.  Lipids well controlled on statin tx.  Not enough exercise.  Echo 2/22 EF 45%, LAE, BLAZE, mod MR, mild TR.  Compliant w meds.  No TIA/CVA sxs.  On asa qd.  Echo 7/21 EF 35%, LAE, mild MR/TR.   Weight stable.     8/18/22  Pt had L ankle pain will need surgery for oblique mildly displaced fracture of the lateral malleolus with mild lateral and posterior displacement of the distal fracture fragment. Presents for preop CV eval. Pt of Dr. James. ECHO 2/2022 with EF 45%, mod MR, mild PI, mild TR, PASP 37 mmHg.   ECG - Afib, V rate 75, poor RWP cannot rule out anterior inf     Patient feels  no leg swelling, no PND, no palpitation, no dizziness, no syncope, no CNS symptoms.    Patient has fairly good exercise tolerance.    Patient is compliant with  medications.      Results for orders placed during the hospital encounter of 02/03/22    Echo    Interpretation Summary  · The left ventricle is normal in size with concentric remodeling  · The estimated PA systolic pressure is 37 mmHg.  · Normal right ventricular size with normal right ventricular systolic function.  · Intermediate central venous pressure (8 mmHg).  · The estimated ejection fraction is 45%.  · Moderate left atrial enlargement.  · Moderate right atrial enlargement.  · Moderate mitral regurgitation.  · Mild pulmonic regurgitation.  · Mild tricuspid regurgitation.  · Atrial fibrillation observed.      Past Medical History:   Diagnosis Date    Atrial fibrillation, chronic     Chronic systolic congestive heart failure 5/4/2018    Colon polyp 04,15    Essential hypertension 6/14/2017    Subclinical hypothyroidism        Past Surgical History:   Procedure Laterality Date    COLONOSCOPY  2004,5/15    with polyps per Chart    LEFT HEART CATHETERIZATION Left 6/21/2018    Procedure: HEART CATH-LEFT;  Surgeon: Leon James MD;  Location: [facility] CATH LAB;  Service: Cardiology;  Laterality: Left;  7:30 start time  right radial approach       Social History     Tobacco Use    Smoking status: Never Smoker    Smokeless tobacco: Never Used   Substance Use Topics    Alcohol use: Yes     Comment: Rarely    Drug use: No       Family History   Problem Relation Age of Onset    Lung cancer Mother     Hypertension Mother     Obesity Mother     Cataracts Mother     Emphysema Father     Heart disease Father        Patient's Medications   New Prescriptions    No medications on file   Previous Medications    ASPIRIN (ECOTRIN) 81 MG EC TABLET    Take 1 tablet (81 mg total) by mouth once daily.    CHOLECALCIFEROL, VITAMIN D3, 125 MCG (5,000 UNIT) TAB    Take 5,000 Units by mouth once daily.    CYANOCOBALAMIN (VITAMIN B-12) 1000 MCG TABLET    Take 100 mcg by mouth once daily.    LEVOTHYROXINE (SYNTHROID) 88 MCG  "TABLET    Take 1 tablet (88 mcg total) by mouth before breakfast.    LISINOPRIL 10 MG TABLET    Take 1 tablet (10 mg total) by mouth once daily.    METOPROLOL SUCCINATE (TOPROL-XL) 50 MG 24 HR TABLET    TAKE 1 TABLET DAILY    MULTIVITAMIN CAPSULE    Take 1 capsule by mouth once daily.    PRIMIDONE (MYSOLINE) 50 MG TAB    Take 1 tablet (50 mg total) by mouth every evening.    ROSUVASTATIN (CRESTOR) 10 MG TABLET    Take 1 tablet (10 mg total) by mouth every evening.   Modified Medications    No medications on file   Discontinued Medications    No medications on file       Review of Systems   Constitutional: Negative.    HENT: Negative.    Eyes: Negative.    Respiratory: Negative.    Cardiovascular: Negative.    Gastrointestinal: Negative.    Genitourinary: Negative.    Musculoskeletal: Positive for joint pain.   Skin: Negative.    Neurological: Negative.    Endo/Heme/Allergies: Negative.    Psychiatric/Behavioral: Negative.    All 12 systems otherwise negative.      Wt Readings from Last 3 Encounters:   08/18/22 88.9 kg (195 lb 15.8 oz)   08/16/22 87.1 kg (192 lb 0.3 oz)   08/09/22 86.4 kg (190 lb 9.4 oz)     Temp Readings from Last 3 Encounters:   08/16/22 98.4 °F (36.9 °C) (Tympanic)   08/09/22 98.7 °F (37.1 °C) (Temporal)   02/01/22 97.8 °F (36.6 °C) (Tympanic)     BP Readings from Last 3 Encounters:   08/18/22 124/84   08/16/22 128/64   08/09/22 112/64     Pulse Readings from Last 3 Encounters:   08/18/22 (!) 56   08/16/22 80   08/09/22 92       /84   Pulse (!) 56   Ht 5' 10" (1.778 m)   Wt 88.9 kg (195 lb 15.8 oz)   SpO2 99%   BMI 28.12 kg/m²     Objective:   Physical Exam  Vitals and nursing note reviewed.   Constitutional:       General: He is not in acute distress.     Appearance: He is well-developed. He is not diaphoretic.   HENT:      Head: Normocephalic and atraumatic.      Nose: Nose normal.   Eyes:      General: No scleral icterus.     Conjunctiva/sclera: Conjunctivae normal.   Neck:      " Thyroid: No thyromegaly.      Vascular: No JVD.   Cardiovascular:      Rate and Rhythm: Normal rate. Rhythm irregular.      Heart sounds: S1 normal and S2 normal. Murmur heard.     No friction rub. No gallop. No S3 or S4 sounds.   Pulmonary:      Effort: Pulmonary effort is normal. No respiratory distress.      Breath sounds: Normal breath sounds. No stridor. No wheezing or rales.   Chest:      Chest wall: No tenderness.   Abdominal:      General: Bowel sounds are normal. There is no distension.      Palpations: Abdomen is soft. There is no mass.      Tenderness: There is no abdominal tenderness. There is no rebound.   Genitourinary:     Comments: Deferred  Musculoskeletal:         General: No tenderness or deformity. Normal range of motion.      Cervical back: Normal range of motion and neck supple.   Lymphadenopathy:      Cervical: No cervical adenopathy.   Skin:     General: Skin is warm and dry.      Coloration: Skin is not pale.      Findings: No erythema or rash.   Neurological:      Mental Status: He is alert and oriented to person, place, and time.      Motor: No abnormal muscle tone.      Coordination: Coordination normal.   Psychiatric:         Behavior: Behavior normal.         Thought Content: Thought content normal.         Judgment: Judgment normal.         Lab Results   Component Value Date     02/01/2022    K 5.0 02/01/2022     02/01/2022    CO2 29 02/01/2022    BUN 16 02/01/2022    CREATININE 0.8 02/01/2022    GLU 93 02/01/2022    AST 24 02/01/2022    ALT 17 02/01/2022    ALBUMIN 4.0 02/01/2022    PROT 7.2 02/01/2022    BILITOT 1.1 (H) 02/01/2022    WBC 5.34 08/09/2022    HGB 14.5 08/09/2022    HCT 45.1 08/09/2022    MCV 95 08/09/2022     08/09/2022    INR 1.0 06/14/2018    TSH 2.815 08/09/2022    CHOL 124 02/01/2022    HDL 40 02/01/2022    LDLCALC 61.0 (L) 02/01/2022    TRIG 115 02/01/2022     Assessment:      1. Preop cardiovascular exam    2. Essential hypertension    3.  Chronic combined systolic and diastolic congestive heart failure    4. Atrial fibrillation, chronic    5. Nonrheumatic mitral valve regurgitation        Plan:   1. Preop CV eval - right foot surgery  - order pharm nuclear stress to r/o ischemia  - if neg can proceed will risk stratify    2. HTN  - titrate meds    3. CHF EF 45% with MR  - cont tx and titrate   - need OMT    4. Afib, chronic  - not on DOAC due to pt pref - pt still does not want to  - cont BB    5. Hypothyroidism  - cont Synthroid     Follow up with Dr. James     Thank you for allowing me to participate in this patient's care. Please do not hesitate to contact me with any questions or concerns. Consult note has been forwarded to the referral physician.

## 2022-08-19 NOTE — PROGRESS NOTES
Subjective:       Patient ID: Ezio James is a 71 y.o. male.    Chief Complaint: Ankle Pain (Patient present with 6/10 pain to right ankle. He states he was walking down stairs and fell on the last one. )      HPI:  Ezio James presents to the office today at the referral of Dr. Patricio Tate with moderate to severe right ankle pain.  Patient states that last Friday, he was walking down the steps at an apartment building/renal properly.  Upon getting down to the final step, he slipped and resulted in an inversion ankle injury of his right ankle.  He states that he has moderate to severe pain at the time of the injury.  He reports that pain has decreased to a more tolerable level.  He is ambulating with regular shoe gear and states having no discomfort.  He reports minor swelling at the end of the day.    Review of patient's allergies indicates:  No Known Allergies    Past Medical History:   Diagnosis Date    Atrial fibrillation, chronic     Chronic systolic congestive heart failure 5/4/2018    Colon polyp 04,15    Essential hypertension 6/14/2017    Subclinical hypothyroidism        Family History   Problem Relation Age of Onset    Lung cancer Mother     Hypertension Mother     Obesity Mother     Cataracts Mother     Emphysema Father     Heart disease Father        Social History     Socioeconomic History    Marital status:    Tobacco Use    Smoking status: Never Smoker    Smokeless tobacco: Never Used   Substance and Sexual Activity    Alcohol use: Yes     Comment: Rarely    Drug use: No    Sexual activity: Yes     Partners: Female     Birth control/protection: None       Past Surgical History:   Procedure Laterality Date    COLONOSCOPY  2004,5/15    with polyps per Chart    LEFT HEART CATHETERIZATION Left 6/21/2018    Procedure: HEART CATH-LEFT;  Surgeon: Leon James MD;  Location: [facility] CATH LAB;  Service: Cardiology;  Laterality: Left;  7:30 start time  right radial approach        Review of Systems       Objective:   There were no vitals taken for this visit.    X-Ray Chest 1 View Pre-OP  Narrative: EXAMINATION:  XR CHEST 1 VIEW PRE-OP    CLINICAL HISTORY:  . Encounter for preprocedural cardiovascular examination    TECHNIQUE:  Single frontal portable view of the chest was performed.    COMPARISON:  10/05/2017.    FINDINGS:  Support devices: None    The lungs are clear, with normal appearance of pulmonary vasculature and no pleural effusion or pneumothorax.    The cardiac silhouette is normal in size. The hilar and mediastinal contours are unremarkable.  Mild elevation left hemidiaphragm.    Bones are intact.  Impression: No acute abnormality.    Electronically signed by: Biju Miranda  Date:    08/18/2022  Time:    11:58       Physical Exam  LOWER EXTREMITY PHYSICAL EXAMINATION    VASCULAR: The right DP pulse is 2/4 and the left DP is 2/4. The right PT pulse is 2/4 and the left PT pulse is 2/4. Proximal to distal, warm to warm. No dependent rubor or elevation palor is noted. Capillary refill time is less than 3 seconds. Hair growth is appreciated to the dorsal foot and digits.    NEUROLOGY:  Sharp/dull, light touch, proprioception all intact and equal bilaterally.  Vibratory sensation is intact.  Protective sensation intact    DERMATOLOGY: Skin is supple, dry and intact. No ecchymosis is noted. No hypertrophic skin formation.  Moderate edema noted to the right foot and ankle.  No erythema or cellulitis is noted.     ORTHOPEDIC:  Pain on palpation of the right ankle most specifically on the fibula just inferior to the ankle joint.  There is moderate pain with direct palpation of this area.  There is no clinical gross or structural anatomic deformity.  Has no pain with range of motion in dorsiflexion or plantar flexion of the ankle joint.  No pain with medial lateral squeeze of the syndesmosis.  No pain to the 5th metatarsal base.    Assessment:     1. Closed fracture of right ankle,  initial encounter    2. Acute right ankle pain    3. Atrial fibrillation, chronic    4. Chronic combined systolic and diastolic congestive heart failure        Plan:     Closed fracture of right ankle, initial encounter    Acute right ankle pain  -     Ambulatory referral/consult to Podiatry    Atrial fibrillation, chronic    Chronic combined systolic and diastolic congestive heart failure      Thorough discussion is had with the patient today, concerning the diagnosis, its etiology, and the treatment algorithm at present.    X-rays were taken today and reviewed by myself patient room.  There appears show a short oblique fracture to the right fibula inferior to the level of the ankle joint.    We discussed treatment options regarding the right ankle fracture.  Based on x-ray imaging, this appears to need open reduction internal fixation based on all counts, however with him ambulating in 0/10 pain in the conjunction with history chronic atrial fibrillation and chronic systolic and diastolic heart failure, he can proceed with conservative therapies this time.  We discussed having patient ambulate with a Cam boot to protect the foot and ankle.  He will follow-up in approximately 2 weeks.  This will give us ample time to determine if surgical intervention is needed.    Preemptively, we will have patient be evaluated by Cardiology today to assess for preoperative clearance if needed once determining if surgical intervention would be scheduled for the right ankle.    Patient was agreeable with the plan.        Future Appointments   Date Time Provider Department Center   9/1/2022  1:00 PM FREDY RADHA-DR [facility] [place]   9/1/2022  1:30 PM Lewis Oro DPM [facility]   9/7/2022  2:00 PM Christelle Wilhelm NP ON NEURO [facility]   9/12/2022 11:45 AM [facility] NM2 CAM1 CARDIAC [facility]   9/12/2022 12:45 PM TREADMILL, NUCLEAR MEDICINE [facility] SPECCPR [facility]   9/12/2022  1:45 PM [facility] NM2 CAM1  CARDIAC [facility]   9/27/2022  3:00 PM Leon James MD [facility] CARDIO [facility]   2/1/2023  7:30 AM Darren Nova MD ON[facility]

## 2022-09-01 ENCOUNTER — HOSPITAL ENCOUNTER (OUTPATIENT)
Dept: RADIOLOGY | Facility: HOSPITAL | Age: 72
Discharge: HOME | End: 2022-09-01
Attending: PODIATRIST
Payer: MEDICARE

## 2022-09-01 ENCOUNTER — OFFICE VISIT (OUTPATIENT)
Dept: PODIATRY | Facility: CLINIC | Age: 72
End: 2022-09-01
Payer: MEDICARE

## 2022-09-01 VITALS — HEIGHT: 70 IN | BODY MASS INDEX: 28.06 KG/M2 | WEIGHT: 196 LBS

## 2022-09-01 DIAGNOSIS — M25.571 PAIN AND SWELLING OF ANKLE, RIGHT: ICD-10-CM

## 2022-09-01 DIAGNOSIS — M25.471 PAIN AND SWELLING OF ANKLE, RIGHT: ICD-10-CM

## 2022-09-01 DIAGNOSIS — I10 ESSENTIAL HYPERTENSION: ICD-10-CM

## 2022-09-01 DIAGNOSIS — S82.61XD CLOSED DISPLACED FRACTURE OF LATERAL MALLEOLUS OF RIGHT FIBULA WITH ROUTINE HEALING, SUBSEQUENT ENCOUNTER: Primary | ICD-10-CM

## 2022-09-01 PROCEDURE — 73610 XR ANKLE COMPLETE 3 VIEW RIGHT: ICD-10-PCS | Mod: 26,RT,, | Performed by: RADIOLOGY

## 2022-09-01 PROCEDURE — 1159F PR MEDICATION LIST DOCUMENTED IN MEDICAL RECORD: ICD-10-PCS | Mod: CPTII,S$GLB,, | Performed by: PODIATRIST

## 2022-09-01 PROCEDURE — 3288F PR FALLS RISK ASSESSMENT DOCUMENTED: ICD-10-PCS | Mod: CPTII,S$GLB,, | Performed by: PODIATRIST

## 2022-09-01 PROCEDURE — 1101F PT FALLS ASSESS-DOCD LE1/YR: CPT | Mod: CPTII,S$GLB,, | Performed by: PODIATRIST

## 2022-09-01 PROCEDURE — 1101F PR PT FALLS ASSESS DOC 0-1 FALLS W/OUT INJ PAST YR: ICD-10-PCS | Mod: CPTII,S$GLB,, | Performed by: PODIATRIST

## 2022-09-01 PROCEDURE — 1125F PR PAIN SEVERITY QUANTIFIED, PAIN PRESENT: ICD-10-PCS | Mod: CPTII,S$GLB,, | Performed by: PODIATRIST

## 2022-09-01 PROCEDURE — 3008F BODY MASS INDEX DOCD: CPT | Mod: CPTII,S$GLB,, | Performed by: PODIATRIST

## 2022-09-01 PROCEDURE — 99999 PR PBB SHADOW E&M-EST. PATIENT-LVL III: ICD-10-PCS | Mod: PBBFAC,,, | Performed by: PODIATRIST

## 2022-09-01 PROCEDURE — 3008F PR BODY MASS INDEX (BMI) DOCUMENTED: ICD-10-PCS | Mod: CPTII,S$GLB,, | Performed by: PODIATRIST

## 2022-09-01 PROCEDURE — 1125F AMNT PAIN NOTED PAIN PRSNT: CPT | Mod: CPTII,S$GLB,, | Performed by: PODIATRIST

## 2022-09-01 PROCEDURE — 73610 X-RAY EXAM OF ANKLE: CPT | Mod: TC,RT

## 2022-09-01 PROCEDURE — 99999 PR PBB SHADOW E&M-EST. PATIENT-LVL III: CPT | Mod: PBBFAC,,, | Performed by: PODIATRIST

## 2022-09-01 PROCEDURE — 99213 OFFICE O/P EST LOW 20 MIN: CPT | Mod: S$GLB,,, | Performed by: PODIATRIST

## 2022-09-01 PROCEDURE — 99213 PR OFFICE/OUTPT VISIT, EST, LEVL III, 20-29 MIN: ICD-10-PCS | Mod: S$GLB,,, | Performed by: PODIATRIST

## 2022-09-01 PROCEDURE — 4010F PR ACE/ARB THEARPY RXD/TAKEN: ICD-10-PCS | Mod: CPTII,S$GLB,, | Performed by: PODIATRIST

## 2022-09-01 PROCEDURE — 3288F FALL RISK ASSESSMENT DOCD: CPT | Mod: CPTII,S$GLB,, | Performed by: PODIATRIST

## 2022-09-01 PROCEDURE — 1159F MED LIST DOCD IN RCRD: CPT | Mod: CPTII,S$GLB,, | Performed by: PODIATRIST

## 2022-09-01 PROCEDURE — 73610 X-RAY EXAM OF ANKLE: CPT | Mod: 26,RT,, | Performed by: RADIOLOGY

## 2022-09-01 PROCEDURE — 4010F ACE/ARB THERAPY RXD/TAKEN: CPT | Mod: CPTII,S$GLB,, | Performed by: PODIATRIST

## 2022-09-01 NOTE — PROGRESS NOTES
Subjective:       Patient ID: Ezio James is a 71 y.o. male.    Chief Complaint: Follow-up (Closed fx of right ankle, 2/10 pain at present, non-diabetic, pcp )      HPI: Ezio James presents to the clinic today for follow up evaluation concerning RLE ankle fracture. WB with Aircast. Did have XR this afternoon. Has been in walking boot for 2.5 weeks. States persistent mild swelling. States Vit. D.     Review of patient's allergies indicates:  No Known Allergies    Past Medical History:   Diagnosis Date    Atrial fibrillation, chronic     Chronic systolic congestive heart failure 5/4/2018    Colon polyp 04,15    Essential hypertension 6/14/2017    Subclinical hypothyroidism        Family History   Problem Relation Age of Onset    Lung cancer Mother     Hypertension Mother     Obesity Mother     Cataracts Mother     Emphysema Father     Heart disease Father        Social History     Socioeconomic History    Marital status:    Tobacco Use    Smoking status: Never    Smokeless tobacco: Never   Substance and Sexual Activity    Alcohol use: Yes     Comment: Rarely    Drug use: No    Sexual activity: Yes     Partners: Female     Birth control/protection: None       Past Surgical History:   Procedure Laterality Date    COLONOSCOPY  2004,5/15    with polyps per Chart    LEFT HEART CATHETERIZATION Left 6/21/2018    Procedure: HEART CATH-LEFT;  Surgeon: Leon James MD;  Location: [facility] CATH LAB;  Service: Cardiology;  Laterality: Left;  7:30 start time  right radial approach       Review of Systems   Constitutional:  Negative for chills, fatigue and fever.   HENT:  Negative for hearing loss.    Eyes:  Negative for photophobia and visual disturbance.   Respiratory:  Negative for cough, chest tightness, shortness of breath and wheezing.    Cardiovascular:  Negative for chest pain and palpitations.   Gastrointestinal:  Negative for constipation, diarrhea, nausea and vomiting.   Endocrine: Negative for cold  "intolerance and heat intolerance.   Genitourinary:  Negative for flank pain.   Musculoskeletal:  Positive for gait problem. Negative for neck pain and neck stiffness.   Neurological:  Negative for light-headedness and headaches.   Psychiatric/Behavioral:  Negative for sleep disturbance.        Objective:   Ht 5' 10" (1.778 m)   Wt 88.9 kg (195 lb 15.8 oz)   BMI 28.12 kg/m²     Physical Exam  LOWER EXTREMITY PHYSICAL EXAMINATION  DERMATOLOGY: Skin is supple, dry and intact. No ecchymosis is noted.    ORTHOPEDIC: Moderate edema is noted. No instability is noted. Antalgic gait. Moderate fibular pains. Mild to moderate AL and AM gutter pains. Stress testing is guarded. Drawer testing is WNL. Anatomic  ankle is noted.    Assessment:     1. Closed displaced fracture of lateral malleolus of right fibula with routine healing, subsequent encounter    2. Pain and swelling of ankle, right          Plan:     Closed displaced fracture of lateral malleolus of right fibula with routine healing, subsequent encounter  -     X-Ray Ankle Complete Right; Future; Expected date: 09/15/2022    Pain and swelling of ankle, right  -     X-Ray Ankle Complete Right; Future; Expected date: 09/15/2022    Thorough discussion is had with the patient today, concerning the diagnosis, its etiology, and the treatment algorithm at present.     XRAYS are reviewed in detail with the patient. All questions and concerns regarding findings and its/their implications are outlined and discussed.    XRAY shows similar appearance as compared to prior w/o further displacement. There is a sizeable posterior gap of the fracture site, but there is bone to bone contact distally with spiral fracture orientation.     Continue Aircast for duration of 6-8 weeks total.    RICE therapy ATC.    WBAT prn.    Vit. D.    XRAY in 3 weeks with phone consultation.           Future Appointments   Date Time Provider Department Center   9/7/2022  2:00 PM Christelle Wilhelm NP " [facility] NEUR[facility]   9/12/2022 11:45 AM [facility] NM2 CAM1 CARDIAC [facility]   9/12/2022 12:45 PM TREADMILL, NUCLEAR MEDICINE [facility] SPE[facility]PR [facility]   9/12/2022  1:45 PM [facility] NM2 CAM1 CARDIAC [facility]   9/27/2022  3:00 PM Leon James MD [facility] CARDIO [facility]   2/1/2023  7:30 AM Darren Nova MD [facility] IM [facility]

## 2022-09-07 ENCOUNTER — OFFICE VISIT (OUTPATIENT)
Dept: NEUROLOGY | Facility: CLINIC | Age: 72
End: 2022-09-07
Payer: MEDICARE

## 2022-09-07 VITALS
WEIGHT: 198.88 LBS | RESPIRATION RATE: 16 BRPM | HEART RATE: 73 BPM | BODY MASS INDEX: 28.47 KG/M2 | SYSTOLIC BLOOD PRESSURE: 129 MMHG | DIASTOLIC BLOOD PRESSURE: 76 MMHG | HEIGHT: 70 IN | OXYGEN SATURATION: 96 %

## 2022-09-07 DIAGNOSIS — R25.1 TREMOR: ICD-10-CM

## 2022-09-07 DIAGNOSIS — G25.0 BENIGN ESSENTIAL TREMOR: Primary | ICD-10-CM

## 2022-09-07 PROCEDURE — 3008F BODY MASS INDEX DOCD: CPT | Mod: CPTII,S$GLB,, | Performed by: NURSE PRACTITIONER

## 2022-09-07 PROCEDURE — 1159F MED LIST DOCD IN RCRD: CPT | Mod: CPTII,S$GLB,, | Performed by: NURSE PRACTITIONER

## 2022-09-07 PROCEDURE — 3288F FALL RISK ASSESSMENT DOCD: CPT | Mod: CPTII,S$GLB,, | Performed by: NURSE PRACTITIONER

## 2022-09-07 PROCEDURE — 1100F PR PT FALLS ASSESS DOC 2+ FALLS/FALL W/INJURY/YR: ICD-10-PCS | Mod: CPTII,S$GLB,, | Performed by: NURSE PRACTITIONER

## 2022-09-07 PROCEDURE — 3008F PR BODY MASS INDEX (BMI) DOCUMENTED: ICD-10-PCS | Mod: CPTII,S$GLB,, | Performed by: NURSE PRACTITIONER

## 2022-09-07 PROCEDURE — 3078F PR MOST RECENT DIASTOLIC BLOOD PRESSURE < 80 MM HG: ICD-10-PCS | Mod: CPTII,S$GLB,, | Performed by: NURSE PRACTITIONER

## 2022-09-07 PROCEDURE — 1100F PTFALLS ASSESS-DOCD GE2>/YR: CPT | Mod: CPTII,S$GLB,, | Performed by: NURSE PRACTITIONER

## 2022-09-07 PROCEDURE — 3288F PR FALLS RISK ASSESSMENT DOCUMENTED: ICD-10-PCS | Mod: CPTII,S$GLB,, | Performed by: NURSE PRACTITIONER

## 2022-09-07 PROCEDURE — 1159F PR MEDICATION LIST DOCUMENTED IN MEDICAL RECORD: ICD-10-PCS | Mod: CPTII,S$GLB,, | Performed by: NURSE PRACTITIONER

## 2022-09-07 PROCEDURE — 99999 PR PBB SHADOW E&M-EST. PATIENT-LVL IV: CPT | Mod: PBBFAC,,, | Performed by: NURSE PRACTITIONER

## 2022-09-07 PROCEDURE — 1125F PR PAIN SEVERITY QUANTIFIED, PAIN PRESENT: ICD-10-PCS | Mod: CPTII,S$GLB,, | Performed by: NURSE PRACTITIONER

## 2022-09-07 PROCEDURE — 4010F ACE/ARB THERAPY RXD/TAKEN: CPT | Mod: CPTII,S$GLB,, | Performed by: NURSE PRACTITIONER

## 2022-09-07 PROCEDURE — 99212 OFFICE O/P EST SF 10 MIN: CPT | Mod: S$GLB,,, | Performed by: NURSE PRACTITIONER

## 2022-09-07 PROCEDURE — 1160F PR REVIEW ALL MEDS BY PRESCRIBER/CLIN PHARMACIST DOCUMENTED: ICD-10-PCS | Mod: CPTII,S$GLB,, | Performed by: NURSE PRACTITIONER

## 2022-09-07 PROCEDURE — 3078F DIAST BP <80 MM HG: CPT | Mod: CPTII,S$GLB,, | Performed by: NURSE PRACTITIONER

## 2022-09-07 PROCEDURE — 3074F SYST BP LT 130 MM HG: CPT | Mod: CPTII,S$GLB,, | Performed by: NURSE PRACTITIONER

## 2022-09-07 PROCEDURE — 99999 PR PBB SHADOW E&M-EST. PATIENT-LVL IV: ICD-10-PCS | Mod: PBBFAC,,, | Performed by: NURSE PRACTITIONER

## 2022-09-07 PROCEDURE — 3074F PR MOST RECENT SYSTOLIC BLOOD PRESSURE < 130 MM HG: ICD-10-PCS | Mod: CPTII,S$GLB,, | Performed by: NURSE PRACTITIONER

## 2022-09-07 PROCEDURE — 99212 PR OFFICE/OUTPT VISIT, EST, LEVL II, 10-19 MIN: ICD-10-PCS | Mod: S$GLB,,, | Performed by: NURSE PRACTITIONER

## 2022-09-07 PROCEDURE — 4010F PR ACE/ARB THEARPY RXD/TAKEN: ICD-10-PCS | Mod: CPTII,S$GLB,, | Performed by: NURSE PRACTITIONER

## 2022-09-07 PROCEDURE — 1160F RVW MEDS BY RX/DR IN RCRD: CPT | Mod: CPTII,S$GLB,, | Performed by: NURSE PRACTITIONER

## 2022-09-07 PROCEDURE — 1125F AMNT PAIN NOTED PAIN PRSNT: CPT | Mod: CPTII,S$GLB,, | Performed by: NURSE PRACTITIONER

## 2022-09-07 NOTE — PROGRESS NOTES
Subjective:       Patient ID: Ezio James is a 71 y.o. male.    Chief Complaint: tremor follow up      HPI     The patient is presenting with tremors that started 7 years ago (2015). The tremors started insidiously and progressed slowly over the last 7 years. The tremors are mainly in both arms (RT>LT). The tremors do emerge during action like writing or holding things. No rest tremors. No neck tremors. No leg tremors upon standing. No voice tremors. No muscle rigidity of muscle stiffness. No postural instability. No memory loss or dementia. Cannot tell if alcohol improves the tremors because the patient does not drink enough. Anxiety and emotional stress exacerbates the tremor. No significant diurnal variation in the tremors. No falls. No history of strokes. No head injury. No stimulants on board. No new meds started recently. Multiple family members have similar tremors, especially his father. One of his brothers was diagnosed with Parkinsons disease. He noted that coffee made the tremors much worse. Patient started primidone 50 mg QHS without adverse effects. He states his tremors have improved on the medication. He states he is happy with his current regiment. 02- CTH No significant abnormalities. Atrophy and chronic white matter changes.    Interval History 9-7-2022: Patient presents to follow up appointment unaccompanied to discuss tremors. Patient continues to take Primidone 50 mg QHS with great benefit in tremor reduction. No adverse effects. Happy with current regiment.        Review of Systems   Constitutional:  Negative for appetite change and fatigue.   HENT:  Positive for hearing loss. Negative for tinnitus.    Eyes:  Negative for photophobia and visual disturbance.   Respiratory:  Negative for apnea and shortness of breath.    Cardiovascular:  Negative for chest pain and palpitations.   Gastrointestinal:  Negative for nausea and vomiting.   Endocrine: Negative for cold intolerance and heat  intolerance.   Genitourinary:  Negative for difficulty urinating and urgency.   Musculoskeletal:  Positive for gait problem. Negative for arthralgias, back pain, joint swelling, myalgias, neck pain and neck stiffness.   Skin:  Negative for color change and rash.   Allergic/Immunologic: Negative for environmental allergies and immunocompromised state.   Neurological:  Positive for tremors. Negative for dizziness, seizures, syncope, facial asymmetry, speech difficulty, weakness, light-headedness, numbness and headaches.   Hematological:  Negative for adenopathy. Does not bruise/bleed easily.   Psychiatric/Behavioral:  Negative for agitation, behavioral problems, confusion, decreased concentration, dysphoric mood, hallucinations, self-injury, sleep disturbance and suicidal ideas. The patient is not hyperactive.              Current Outpatient Medications:     aspirin (ECOTRIN) 81 MG EC tablet, Take 1 tablet (81 mg total) by mouth once daily., Disp: 90 tablet, Rfl: 2    cholecalciferol, vitamin D3, 125 mcg (5,000 unit) Tab, Take 5,000 Units by mouth once daily., Disp: , Rfl:     cyanocobalamin (VITAMIN B-12) 1000 MCG tablet, Take 100 mcg by mouth once daily., Disp: , Rfl:     levothyroxine (SYNTHROID) 88 MCG tablet, Take 1 tablet (88 mcg total) by mouth before breakfast., Disp: 90 tablet, Rfl: 1    lisinopriL 10 MG tablet, Take 1 tablet (10 mg total) by mouth once daily., Disp: 90 tablet, Rfl: 3    metoprolol succinate (TOPROL-XL) 50 MG 24 hr tablet, TAKE 1 TABLET DAILY, Disp: 90 tablet, Rfl: 3    multivitamin capsule, Take 1 capsule by mouth once daily., Disp: , Rfl:     primidone (MYSOLINE) 50 MG Tab, Take 1 tablet (50 mg total) by mouth every evening., Disp: 90 tablet, Rfl: 3    rosuvastatin (CRESTOR) 10 MG tablet, Take 1 tablet (10 mg total) by mouth every evening., Disp: 90 tablet, Rfl: 0         Past Medical History:   Diagnosis Date    Atrial fibrillation, chronic     Chronic systolic congestive heart failure  5/4/2018    Colon polyp 04,15    Essential hypertension 6/14/2017    Subclinical hypothyroidism      Past Surgical History:   Procedure Laterality Date    COLONOSCOPY  2004,5/15    with polyps per Chart    LEFT HEART CATHETERIZATION Left 6/21/2018    Procedure: HEART CATH-LEFT;  Surgeon: Leon James MD;  Location: [facility] CATH LAB;  Service: Cardiology;  Laterality: Left;  7:30 start time  right radial approach     Social History     Socioeconomic History    Marital status:    Tobacco Use    Smoking status: Never    Smokeless tobacco: Never   Substance and Sexual Activity    Alcohol use: Yes     Comment: Rarely    Drug use: No    Sexual activity: Yes     Partners: Female     Birth control/protection: None             Past/Current Medical/Surgical History, Past/Current Social History, Past/Current Family History and Past/Current Medications were reviewed in detail.        Objective:     VITAL SIGNS WERE REVIEWED      GENERAL APPEARANCE:     The patient looks comfortable.    BMI 28.53    No signs of respiratory distress.    Normal breathing pattern.    No dysmorphic features    Normal eye contact.     GENERAL MEDICAL EXAM:    HEENT:  Head is atraumatic normocephalic. Fundoscopic (Ophthalmoscopic) exam showed no disc edema.      Neck and Axillae: No JVD. No visible lesions.    Cardiopulmonary: No cyanosis. No tachypnea. Normal respiratory effort.    Gastrointestinal/Urogenital:  No jaundice. No stomas or lesions. No visible hernias. No catheters.     Skin, Hair and Nails: No pathognonomic skin rash. No neurofibromatosis. No visible lesions.No stigmata of autoimmune disease. No clubbing.    Limbs: No varicose veins. No visible swelling.    Muskoskeletal: No visible deformities.No visible lesions. Ortho boot on right foot-fracture           Neurologic Exam     Mental Status   Oriented to person, place, and time.   Follows 3 step commands.   Attention: normal. Concentration: normal.   Speech: speech is normal    Level of consciousness: alert  Knowledge: good.   Able to name object. Able to repeat. Normal comprehension.     Cranial Nerves   Cranial nerves II through XII intact.     CN II   Visual fields full to confrontation.   Visual acuity: normal  Right visual field deficit: none  Left visual field deficit: none     CN III, IV, VI   Pupils are equal, round, and reactive to light.  Extraocular motions are normal.   Right pupil: Size: 2 mm. Shape: regular. Reactivity: brisk. Consensual response: intact. Accommodation: intact.   Left pupil: Size: 2 mm. Shape: regular. Reactivity: brisk. Consensual response: intact. Accommodation: intact.   CN III: no CN III palsy  CN VI: no CN VI palsy  Nystagmus: none   Diplopia: none  Ophthalmoparesis: none  Upgaze: normal  Downgaze: normal  Conjugate gaze: present  Vestibulo-ocular reflex: present    CN V   Facial sensation intact.   Right facial sensation deficit: none  Left facial sensation deficit: none    CN VII   Facial expression full, symmetric.   Right facial weakness: none  Left facial weakness: none    CN VIII   CN VIII normal.   Hearing: intact    CN IX, X   CN IX normal.   CN X normal.   Palate: symmetric    CN XI   CN XI normal.   Right sternocleidomastoid strength: normal  Left sternocleidomastoid strength: normal  Right trapezius strength: normal  Left trapezius strength: normal    CN XII   CN XII normal.   Tongue: not atrophic  Fasciculations: absent  Tongue deviation: none    Motor Exam   Muscle bulk: normal  Overall muscle tone: normal  Right arm tone: normal  Left arm tone: normal  Right arm pronator drift: absent  Left arm pronator drift: absent  Right leg tone: normal  Left leg tone: normal    Strength   Strength 5/5 throughout.   Right strength:   Ortho boot on right foot-fracture  Right neck flexion: 5/5  Left neck flexion: 5/5  Right neck extension: 5/5  Left neck extension: 5/5  Right deltoid: 5/5  Left deltoid: 5/5  Right biceps: 5/5  Left biceps: 5/5  Right  triceps: 5/5  Left triceps: 5/5  Right wrist flexion: 5/5  Left wrist flexion: 5/5  Right wrist extension: 5/5  Left wrist extension: 5/5  Right interossei: 5/5  Left interossei: 5/5  Right iliopsoas: 5/5  Left iliopsoas: 5/5  Right quadriceps: 5/5  Left quadriceps: 5/5  Right hamstrin/5  Left hamstrin/5  Right glutei: 5/5  Left glutei: 5/5  Left anterior tibial: 5/5  Left posterior tibial: 5/5  Left peroneal: 5/5  Left gastroc: 5/5    Sensory Exam   Light touch normal.   Right arm light touch: normal  Left arm light touch: normal  Right leg light touch: normal  Left leg light touch: normal  Right arm vibration: normal  Left arm vibration: normal  Right leg vibration: decreased from toes  Left leg vibration: decreased from toes  Right arm proprioception: normal  Left arm proprioception: normal  Right leg proprioception: decreased from toes  Left leg proprioception: decreased from toes  Pinprick normal.   Right arm pinprick: normal  Left arm pinprick: normal  Right leg pinprick: normal  Left leg pinprick: normal  Graphesthesia: normal  Stereognosis: normal    Gait, Coordination, and Reflexes     Gait  Gait: normal    Coordination   Romberg: negative  Finger to nose coordination: normal  Heel to shin coordination: normal  Tandem walking coordination: normal    Tremor   Resting tremor: absent  Intention tremor: absent  Action tremor: left arm and right arm    Reflexes   Right brachioradialis: 2+  Left brachioradialis: 2+  Right biceps: 2+  Left biceps: 2+  Right triceps: 2+  Left triceps: 2+  Right patellar: 1+  Left patellar: 1+  Right achilles: 0  Left achilles: 0  Right plantar: normal  Left plantar: normal  Right Henderson: absent  Left Henderson: absent  Right ankle clonus: absent  Left ankle clonus: absent  Right pendular knee jerk: absent  Left pendular knee jerk: absent  BUE AP 8 Hz Tremors     RT>LT    IMPROVED      Lab Results   Component Value Date    WBC 7.97 2022    HGB 14.4 2022    HCT  43.0 08/18/2022    MCV 98 08/18/2022     08/18/2022     Sodium   Date Value Ref Range Status   08/18/2022 140 136 - 145 mmol/L Final     Potassium   Date Value Ref Range Status   08/18/2022 4.9 3.5 - 5.1 mmol/L Final     Chloride   Date Value Ref Range Status   08/18/2022 105 95 - 110 mmol/L Final     CO2   Date Value Ref Range Status   08/18/2022 25 23 - 29 mmol/L Final     Glucose   Date Value Ref Range Status   08/18/2022 90 70 - 110 mg/dL Final     BUN   Date Value Ref Range Status   08/18/2022 13 8 - 23 mg/dL Final     Creatinine   Date Value Ref Range Status   08/18/2022 0.9 0.5 - 1.4 mg/dL Final     Calcium   Date Value Ref Range Status   08/18/2022 9.4 8.7 - 10.5 mg/dL Final     Total Protein   Date Value Ref Range Status   02/01/2022 7.2 6.0 - 8.4 g/dL Final     Albumin   Date Value Ref Range Status   02/01/2022 4.0 3.5 - 5.2 g/dL Final     Total Bilirubin   Date Value Ref Range Status   02/01/2022 1.1 (H) 0.1 - 1.0 mg/dL Final     Comment:     For infants and newborns, interpretation of results should be based  on gestational age, weight and in agreement with clinical  observations.    Premature Infant recommended reference ranges:  Up to 24 hours.............<8.0 mg/dL  Up to 48 hours............<12.0 mg/dL  3-5 days..................<15.0 mg/dL  6-29 days.................<15.0 mg/dL       Alkaline Phosphatase   Date Value Ref Range Status   02/01/2022 58 55 - 135 U/L Final     AST   Date Value Ref Range Status   02/01/2022 24 10 - 40 U/L Final     ALT   Date Value Ref Range Status   02/01/2022 17 10 - 44 U/L Final     Anion Gap   Date Value Ref Range Status   08/18/2022 10 8 - 16 mmol/L Final     eGFR if    Date Value Ref Range Status   02/01/2022 >60.0 >60 mL/min/1.73 m^2 Final     eGFR if non    Date Value Ref Range Status   02/01/2022 >60.0 >60 mL/min/1.73 m^2 Final     Comment:     Calculation used to obtain the estimated glomerular filtration  rate (eGFR) is  the CKD-EPI equation.        Lab Results   Component Value Date    FJKNDUMH46 1075 (H) 08/09/2022     Lab Results   Component Value Date    TSH 2.815 08/09/2022    FREET4 0.88 02/01/2022 02-    Free T4 NL      02-    CT No significant abnormalities     Atrophy and chronic white matter changes      Reviewed the neuroimaging independently       Assessment:       1. Benign essential tremor    2. Tremor          Plan:       ESSENTIAL TREMOR (ET), BENIGN, BUE, RT >LT, ONSET 2015         Avoid stimulants like caffeine, nicotineetc.    Avoid hot drinks, drink from half empty glasses and wear heavy bracelet/watch.     Continue primidone 50 mg QHS. The main side effect is sedation and was communicated with the patient. Sexual dysfunction and depression could happen as well. The patient verbalized understanding.    I also counseled the patient about the fact the medication decreases the amplitude and not the rate of the tremor and less effective for titubition.  I also counseled the patient that the disease is slowly progressive.       NEXT OPTIONS:    Topiramate (Topamax)TPM titration to  mg BID which can cause mental slowing, transient tingling, kidney stones, weight loss, cleft lip and palate and rarely glaucoma and visual field defects . The patient was encouraged to drink a lot of fluids.     Methazolamide (Neptazane) 50 mg TID (100 mg BID) which can cause sedation, numbness, gastrointestinal upset and rarely kidney stones. Safety during pregnancy is unknown.    He is already on Metoprolol.         INTERVENTIONAL OPTIONS:      Neuravive, Gamma Knife and DBS.         MEDICAL/SURGICAL COMORBIDITIES     All relevant medical comorbidities noted and managed by primary care physician and medical care team.          MISCELLANEOUS MEDICAL PROBLEMS       HEALTHY LIFESTYLE AND PREVENTATIVE CARE    The patient to adhere to the age-appropriate health maintenance guidelines including screening tests and  vaccinations. The patient to adhere to  healthy lifestyle, optimal weight, exercise, healthy diet, good sleep hygiene and avoiding drugs including smoking, alcohol and recreational drugs.      I spent a total of 15 minutes on the day of the visit.  This includes face to face time and non-face to face time preparing to see the patient (eg, review of tests), obtaining and/or reviewing separately obtained history, documenting clinical information in the electronic or other health record, independently interpreting results and communicating results to the patient/family/caregiver, or care coordinator.        RTC annually           Christelle Wilhelm, MSN, NP    Collaborating Provider: Geri Meeks MD, FAAN Neurologist/Epileptologist

## 2022-09-12 ENCOUNTER — TELEPHONE (OUTPATIENT)
Dept: CARDIOLOGY | Facility: CLINIC | Age: 72
End: 2022-09-12
Payer: MEDICARE

## 2022-09-12 NOTE — TELEPHONE ENCOUNTER
----- Message from Elis Mackey sent at 9/12/2022  9:56 AM CDT -----  Contact: Pt Mobile [phone]  Patient would like a call back in regards to him cancelling his appointments that was scheduled on today 08/12/2022. Patient would like to know if he still needed to go in to be seen for these appointments because he said that he did not have his ankle surgery?

## 2022-09-12 NOTE — TELEPHONE ENCOUNTER
Elis WOLFF Staff  Caller: Pt Mobile [phone] (Today,  9:56 AM)  Patient would like a call back in regards to him cancelling his appointments that was scheduled on today 08/12/2022. Patient would like to know if he still needed to go in to be seen for these appointments because he said that he did not have his ankle surgery?      Patient called to cancel stress test but stress test is not scheduled. Says he doesn't want it.

## 2022-09-22 ENCOUNTER — HOSPITAL ENCOUNTER (OUTPATIENT)
Dept: RADIOLOGY | Facility: HOSPITAL | Age: 72
Discharge: HOME | End: 2022-09-22
Attending: PODIATRIST
Payer: MEDICARE

## 2022-09-22 DIAGNOSIS — M25.471 PAIN AND SWELLING OF ANKLE, RIGHT: ICD-10-CM

## 2022-09-22 DIAGNOSIS — S82.61XD CLOSED DISPLACED FRACTURE OF LATERAL MALLEOLUS OF RIGHT FIBULA WITH ROUTINE HEALING, SUBSEQUENT ENCOUNTER: ICD-10-CM

## 2022-09-22 DIAGNOSIS — M25.571 PAIN AND SWELLING OF ANKLE, RIGHT: ICD-10-CM

## 2022-09-22 PROCEDURE — 73610 XR ANKLE COMPLETE 3 VIEW RIGHT: ICD-10-PCS | Mod: 26,RT,, | Performed by: RADIOLOGY

## 2022-09-22 PROCEDURE — 73610 X-RAY EXAM OF ANKLE: CPT | Mod: 26,RT,, | Performed by: RADIOLOGY

## 2022-09-22 PROCEDURE — 73610 X-RAY EXAM OF ANKLE: CPT | Mod: TC,RT

## 2022-09-27 ENCOUNTER — OFFICE VISIT (OUTPATIENT)
Dept: CARDIOLOGY | Facility: CLINIC | Age: 72
End: 2022-09-27
Payer: MEDICARE

## 2022-09-27 VITALS
HEART RATE: 88 BPM | BODY MASS INDEX: 27.58 KG/M2 | OXYGEN SATURATION: 97 % | DIASTOLIC BLOOD PRESSURE: 80 MMHG | WEIGHT: 192.69 LBS | SYSTOLIC BLOOD PRESSURE: 110 MMHG | HEIGHT: 70 IN

## 2022-09-27 DIAGNOSIS — I10 ESSENTIAL HYPERTENSION: ICD-10-CM

## 2022-09-27 DIAGNOSIS — E66.3 OVERWEIGHT (BMI 25.0-29.9): ICD-10-CM

## 2022-09-27 DIAGNOSIS — I34.0 NONRHEUMATIC MITRAL VALVE REGURGITATION: ICD-10-CM

## 2022-09-27 DIAGNOSIS — I42.8 OTHER CARDIOMYOPATHY: ICD-10-CM

## 2022-09-27 DIAGNOSIS — I51.7 LAE (LEFT ATRIAL ENLARGEMENT): ICD-10-CM

## 2022-09-27 DIAGNOSIS — I50.42 CHRONIC COMBINED SYSTOLIC AND DIASTOLIC CONGESTIVE HEART FAILURE: ICD-10-CM

## 2022-09-27 DIAGNOSIS — R94.31 ABNORMAL ECG: ICD-10-CM

## 2022-09-27 DIAGNOSIS — I48.20 ATRIAL FIBRILLATION, CHRONIC: Primary | ICD-10-CM

## 2022-09-27 DIAGNOSIS — E78.5 DYSLIPIDEMIA: ICD-10-CM

## 2022-09-27 DIAGNOSIS — I42.8 NON-ISCHEMIC CARDIOMYOPATHY: ICD-10-CM

## 2022-09-27 DIAGNOSIS — I51.7 LVH (LEFT VENTRICULAR HYPERTROPHY): ICD-10-CM

## 2022-09-27 PROCEDURE — 99999 PR PBB SHADOW E&M-EST. PATIENT-LVL III: ICD-10-PCS | Mod: PBBFAC,,, | Performed by: INTERNAL MEDICINE

## 2022-09-27 PROCEDURE — 1159F MED LIST DOCD IN RCRD: CPT | Mod: CPTII,S$GLB,, | Performed by: INTERNAL MEDICINE

## 2022-09-27 PROCEDURE — 1160F RVW MEDS BY RX/DR IN RCRD: CPT | Mod: CPTII,S$GLB,, | Performed by: INTERNAL MEDICINE

## 2022-09-27 PROCEDURE — 99214 OFFICE O/P EST MOD 30 MIN: CPT | Mod: S$GLB,,, | Performed by: INTERNAL MEDICINE

## 2022-09-27 PROCEDURE — 3079F DIAST BP 80-89 MM HG: CPT | Mod: CPTII,S$GLB,, | Performed by: INTERNAL MEDICINE

## 2022-09-27 PROCEDURE — 3008F PR BODY MASS INDEX (BMI) DOCUMENTED: ICD-10-PCS | Mod: CPTII,S$GLB,, | Performed by: INTERNAL MEDICINE

## 2022-09-27 PROCEDURE — 3074F PR MOST RECENT SYSTOLIC BLOOD PRESSURE < 130 MM HG: ICD-10-PCS | Mod: CPTII,S$GLB,, | Performed by: INTERNAL MEDICINE

## 2022-09-27 PROCEDURE — 1160F PR REVIEW ALL MEDS BY PRESCRIBER/CLIN PHARMACIST DOCUMENTED: ICD-10-PCS | Mod: CPTII,S$GLB,, | Performed by: INTERNAL MEDICINE

## 2022-09-27 PROCEDURE — 1159F PR MEDICATION LIST DOCUMENTED IN MEDICAL RECORD: ICD-10-PCS | Mod: CPTII,S$GLB,, | Performed by: INTERNAL MEDICINE

## 2022-09-27 PROCEDURE — 3074F SYST BP LT 130 MM HG: CPT | Mod: CPTII,S$GLB,, | Performed by: INTERNAL MEDICINE

## 2022-09-27 PROCEDURE — 3079F PR MOST RECENT DIASTOLIC BLOOD PRESSURE 80-89 MM HG: ICD-10-PCS | Mod: CPTII,S$GLB,, | Performed by: INTERNAL MEDICINE

## 2022-09-27 PROCEDURE — 3008F BODY MASS INDEX DOCD: CPT | Mod: CPTII,S$GLB,, | Performed by: INTERNAL MEDICINE

## 2022-09-27 PROCEDURE — 4010F ACE/ARB THERAPY RXD/TAKEN: CPT | Mod: CPTII,S$GLB,, | Performed by: INTERNAL MEDICINE

## 2022-09-27 PROCEDURE — 99999 PR PBB SHADOW E&M-EST. PATIENT-LVL III: CPT | Mod: PBBFAC,,, | Performed by: INTERNAL MEDICINE

## 2022-09-27 PROCEDURE — 99214 PR OFFICE/OUTPT VISIT, EST, LEVL IV, 30-39 MIN: ICD-10-PCS | Mod: S$GLB,,, | Performed by: INTERNAL MEDICINE

## 2022-09-27 PROCEDURE — 4010F PR ACE/ARB THEARPY RXD/TAKEN: ICD-10-PCS | Mod: CPTII,S$GLB,, | Performed by: INTERNAL MEDICINE

## 2022-09-27 NOTE — PROGRESS NOTES
Subjective:    Patient ID:  Ezio James is a 71 y.o. male who presents for evaluation of Atrial Fibrillation, Valvular Heart Disease, Hypertension, and Congestive Heart Failure        HPIPt presents for eval.   His current med conditions include  HTN, hyperlipidemia, CAD, LAE, MR, TR, CHF, LVH, non-ischemic cardiomyopathy, permanent a fib.   Nonsmoker.   Past hx pertinent for following:  He declined anticoagulation multiple times in past (coumadin and NOAC discussed, see prior notes), wanted to stay on asa.   2014 Echo 40 - 45%.  Stress MPI 2014 EF 35%.  S/p [facility] 6/18 for abnl stress test and to more definitively assess LVEF.  Cath showed widely patent coronary arteries (only luminal irregularities) and LVEF 50 - 55%  Echo 7/21 EF 35%, LAE, mild MR/TR.  ecg 1/29/21 A fib with controlled VR, low precordial R waves.  Echo 2/22 EF 45%, LAE, BLAZE, mod MR, mild TR.  Now here.  Had a fall, fx his right foot.  He saw Nannette Uribe, last month for preop eval.  Stress test advised but pt declined test.  Healing without surgery, almost out of boot now.  Ecg 8/18/22 a fib with controlled VR, low precordial R waves.  No acute changes.  No angina.  No CHF sxs.  No palpitations.  BP is stable.  No TIA/CVA sxs.  On asa qd per his preference.  Lipids controlled on statin tx.  Weight down some.      Past Medical History:   Diagnosis Date    Atrial fibrillation, chronic     Chronic systolic congestive heart failure 5/4/2018    Colon polyp 04,15    Essential hypertension 6/14/2017    Subclinical hypothyroidism      Current Outpatient Medications   Medication Instructions    aspirin (ECOTRIN) 81 mg, Oral, Daily    cholecalciferol (vitamin D3) 5,000 Units, Oral, Daily    cyanocobalamin (VITAMIN B-12) 100 mcg, Oral, Daily    levothyroxine (SYNTHROID) 88 mcg, Oral, Before breakfast    lisinopriL 10 mg, Oral, Daily    metoprolol succinate (TOPROL-XL) 50 MG 24 hr tablet TAKE 1 TABLET DAILY    multivitamin capsule 1 capsule, Oral, Daily  "   primidone (MYSOLINE) 50 mg, Oral, Nightly    rosuvastatin (CRESTOR) 10 mg, Oral, Nightly       Review of Systems   Constitutional: Positive for weight loss.   HENT: Negative.     Eyes: Negative.    Cardiovascular: Negative.    Respiratory: Negative.     Endocrine: Negative.    Hematologic/Lymphatic: Negative.    Skin: Negative.    Musculoskeletal:  Positive for arthritis and joint pain.   Gastrointestinal: Negative.    Genitourinary: Negative.    Neurological: Negative.    Psychiatric/Behavioral: Negative.     Allergic/Immunologic: Negative.         /80 (BP Location: Left arm, Patient Position: Sitting, BP Method: Large (Manual))   Pulse 88   Ht 5' 10" (1.778 m)   Wt 87.4 kg (192 lb 10.9 oz)   SpO2 97%   BMI 27.65 kg/m²     Wt Readings from Last 3 Encounters:   09/27/22 87.4 kg (192 lb 10.9 oz)   09/07/22 90.2 kg (198 lb 13.7 oz)   09/01/22 88.9 kg (195 lb 15.8 oz)     Temp Readings from Last 3 Encounters:   08/16/22 98.4 °F (36.9 °C) (Tympanic)   08/09/22 98.7 °F (37.1 °C) (Temporal)   02/01/22 97.8 °F (36.6 °C) (Tympanic)     BP Readings from Last 3 Encounters:   09/27/22 110/80   09/07/22 129/76   08/18/22 124/84     Pulse Readings from Last 3 Encounters:   09/27/22 88   09/07/22 73   08/18/22 (!) 56         Objective:    Physical Exam  Vitals and nursing note reviewed.   Constitutional:       Appearance: He is well-developed.   HENT:      Head: Normocephalic.   Neck:      Thyroid: No thyromegaly.      Vascular: No carotid bruit or JVD.   Cardiovascular:      Rate and Rhythm: Normal rate. Rhythm irregularly irregular.      Pulses:           Radial pulses are 2+ on the right side and 2+ on the left side.      Heart sounds: S1 normal and S2 normal. Heart sounds not distant. No midsystolic click and no opening snap. No murmur heard.    No friction rub. No S3 or S4 sounds.   Pulmonary:      Effort: Pulmonary effort is normal.      Breath sounds: Normal breath sounds. No wheezing or rales.   Abdominal: "      General: Bowel sounds are normal. There is no distension or abdominal bruit.      Palpations: Abdomen is soft. There is no mass.      Tenderness: There is no abdominal tenderness.   Musculoskeletal:      Cervical back: Neck supple.   Skin:     General: Skin is warm.   Neurological:      Mental Status: He is alert and oriented to person, place, and time.   Psychiatric:         Behavior: Behavior normal.       I have reviewed all pertinent labs and cardiac studies.      Chemistry        Component Value Date/Time     08/18/2022 1129    K 4.9 08/18/2022 1129     08/18/2022 1129    CO2 25 08/18/2022 1129    BUN 13 08/18/2022 1129    CREATININE 0.9 08/18/2022 1129    GLU 90 08/18/2022 1129        Component Value Date/Time    CALCIUM 9.4 08/18/2022 1129    ALKPHOS 58 02/01/2022 1020    AST 24 02/01/2022 1020    ALT 17 02/01/2022 1020    BILITOT 1.1 (H) 02/01/2022 1020    ESTGFRAFRICA >60.0 02/01/2022 1020    EGFRNONAA >60.0 02/01/2022 1020        Lab Results   Component Value Date    WBC 7.97 08/18/2022    HGB 14.4 08/18/2022    HCT 43.0 08/18/2022    MCV 98 08/18/2022     08/18/2022       No results found for: LABA1C, HGBA1C  Lab Results   Component Value Date    CHOL 124 02/01/2022    CHOL 120 02/18/2021    CHOL 118 (L) 11/07/2019     Lab Results   Component Value Date    HDL 40 02/01/2022    HDL 41 02/18/2021    HDL 39 (L) 11/07/2019     Lab Results   Component Value Date    LDLCALC 61.0 (L) 02/01/2022    LDLCALC 58.6 (L) 02/18/2021    LDLCALC 58.6 (L) 11/07/2019     Lab Results   Component Value Date    TRIG 115 02/01/2022    TRIG 102 02/18/2021    TRIG 102 11/07/2019     Lab Results   Component Value Date    CHOLHDL 32.3 02/01/2022    CHOLHDL 34.2 02/18/2021    CHOLHDL 33.1 11/07/2019           Assessment:       1. Atrial fibrillation, chronic    2. Abnormal ECG    3. Chronic combined systolic and diastolic congestive heart failure    4. Other cardiomyopathy    5. Overweight (BMI 25.0-29.9)     6. Nonrheumatic mitral valve regurgitation    7. Non-ischemic cardiomyopathy    8. LVH (left ventricular hypertrophy)    9. LAE (left atrial enlargement)    10. Essential hypertension    11. Dyslipidemia         Plan:               Stable cardiovascular conditions at present time on current medical treatment.  Asymptomatic.  No foot surgery being performed.  Continue med tx for CV conditions.  Discussed indications for NOAC, risks/benefits for a fib again w pt.  He wants to continue on aspirin qd.  He understands that NOAC will provide better CVA protection.  Reviewed all tests and above medical conditions with patient in detail and formulated treatment plan.  Continue optimal medical treatment for cardiovascular conditions.  Cardiac low salt diet advised.  Daily exercise encouraged, with the goal 30 +  minutes aerobic exercise as tolerated.  Maintaining healthy weight and weight loss goals (if needed) were discussed in clinic.  Need for BP control and HTN goals (if needed) were discussed and tx plan formulated.  Importance of optimal lipid control were discussed in detail as well as possible pharmacologic and lifestyle changes that may be needed.  Statin tx.  Fu echo in future to reassess LVEF, valvular conditions.  F/u in 6 months.      I have reviewed all pertinent labs and cardiac studies independently. Plans and recommendations have been formulated under my direct supervision. All questions answered and patient voiced understanding.

## 2022-11-21 ENCOUNTER — TELEPHONE (OUTPATIENT)
Dept: NEUROLOGY | Facility: CLINIC | Age: 72
End: 2022-11-21
Payer: MEDICARE

## 2022-11-21 DIAGNOSIS — G25.0 BENIGN ESSENTIAL TREMOR: ICD-10-CM

## 2022-11-21 RX ORDER — PRIMIDONE 50 MG/1
100 TABLET ORAL NIGHTLY
Qty: 180 TABLET | Refills: 3 | Status: SHIPPED | OUTPATIENT
Start: 2022-11-21 | End: 2023-04-25

## 2022-11-21 NOTE — TELEPHONE ENCOUNTER
LOV 09/07/22     Pt stated in last appt it was  mentioned about increasing the primidone to 100 mg . He stated that he is due for a refill and he want to know if the maxwell can be sent to mail order? Please Advise

## 2022-11-21 NOTE — TELEPHONE ENCOUNTER
----- Message from Tracy Kingfrankierexyassine sent at 11/21/2022  3:09 PM CST -----  Contact: pt  Pt is calling in regards to up the strength of the med, primidone (MYSOLINE) 50 MG Tab,  Please call him back at [phone]  thanks/mpd

## 2022-11-21 NOTE — TELEPHONE ENCOUNTER
----- Message from Fannie Whipple sent at 11/21/2022  4:09 PM CST -----  Contact: Ezio  Type:  Patient Returning Call    Who Called:Ezio  Who Left Message for Patient:ANNA  Does the patient know what this is regarding?:unknown  Would the patient rather a call back or a response via [facility]? Call back  Best Call Back Number:[phone]  Additional Information: Patient would like a call back     Thanks  ANNEL

## 2023-02-01 ENCOUNTER — OFFICE VISIT (OUTPATIENT)
Dept: INTERNAL MEDICINE | Facility: CLINIC | Age: 73
End: 2023-02-01
Payer: MEDICARE

## 2023-02-01 ENCOUNTER — LAB VISIT (OUTPATIENT)
Dept: LAB | Facility: HOSPITAL | Age: 73
End: 2023-02-01
Attending: FAMILY MEDICINE
Payer: MEDICARE

## 2023-02-01 VITALS
BODY MASS INDEX: 28.72 KG/M2 | HEIGHT: 70 IN | SYSTOLIC BLOOD PRESSURE: 124 MMHG | OXYGEN SATURATION: 100 % | DIASTOLIC BLOOD PRESSURE: 76 MMHG | WEIGHT: 200.63 LBS | TEMPERATURE: 98 F | HEART RATE: 83 BPM

## 2023-02-01 DIAGNOSIS — E53.8 B12 DEFICIENCY: ICD-10-CM

## 2023-02-01 DIAGNOSIS — R35.0 BENIGN PROSTATIC HYPERPLASIA WITH URINARY FREQUENCY: ICD-10-CM

## 2023-02-01 DIAGNOSIS — I10 ESSENTIAL HYPERTENSION: ICD-10-CM

## 2023-02-01 DIAGNOSIS — E03.8 SUBCLINICAL HYPOTHYROIDISM: ICD-10-CM

## 2023-02-01 DIAGNOSIS — Z00.00 ANNUAL PHYSICAL EXAM: Primary | ICD-10-CM

## 2023-02-01 DIAGNOSIS — E03.9 HYPOTHYROIDISM, UNSPECIFIED TYPE: ICD-10-CM

## 2023-02-01 DIAGNOSIS — Z12.5 SCREENING FOR PROSTATE CANCER: ICD-10-CM

## 2023-02-01 DIAGNOSIS — G25.0 BENIGN ESSENTIAL TREMOR: ICD-10-CM

## 2023-02-01 DIAGNOSIS — N40.1 BENIGN PROSTATIC HYPERPLASIA WITH URINARY FREQUENCY: ICD-10-CM

## 2023-02-01 DIAGNOSIS — I42.8 NON-ISCHEMIC CARDIOMYOPATHY: ICD-10-CM

## 2023-02-01 DIAGNOSIS — I48.20 ATRIAL FIBRILLATION, CHRONIC: ICD-10-CM

## 2023-02-01 DIAGNOSIS — I51.7 LVH (LEFT VENTRICULAR HYPERTROPHY): ICD-10-CM

## 2023-02-01 DIAGNOSIS — E78.5 DYSLIPIDEMIA: ICD-10-CM

## 2023-02-01 LAB
ALBUMIN SERPL BCP-MCNC: 3.9 G/DL (ref 3.5–5.2)
ALP SERPL-CCNC: 54 U/L (ref 55–135)
ALT SERPL W/O P-5'-P-CCNC: 14 U/L (ref 10–44)
ANION GAP SERPL CALC-SCNC: 8 MMOL/L (ref 8–16)
AST SERPL-CCNC: 22 U/L (ref 10–40)
BASOPHILS # BLD AUTO: 0.03 K/UL (ref 0–0.2)
BASOPHILS NFR BLD: 0.6 % (ref 0–1.9)
BILIRUB SERPL-MCNC: 0.6 MG/DL (ref 0.1–1)
BUN SERPL-MCNC: 15 MG/DL (ref 8–23)
CALCIUM SERPL-MCNC: 9.5 MG/DL (ref 8.7–10.5)
CHLORIDE SERPL-SCNC: 106 MMOL/L (ref 95–110)
CHOLEST SERPL-MCNC: 130 MG/DL (ref 120–199)
CHOLEST/HDLC SERPL: 3.1 {RATIO} (ref 2–5)
CO2 SERPL-SCNC: 28 MMOL/L (ref 23–29)
COMPLEXED PSA SERPL-MCNC: 0.2 NG/ML (ref 0–4)
CREAT SERPL-MCNC: 0.8 MG/DL (ref 0.5–1.4)
DIFFERENTIAL METHOD: ABNORMAL
EOSINOPHIL # BLD AUTO: 0.2 K/UL (ref 0–0.5)
EOSINOPHIL NFR BLD: 3.6 % (ref 0–8)
ERYTHROCYTE [DISTWIDTH] IN BLOOD BY AUTOMATED COUNT: 12.4 % (ref 11.5–14.5)
EST. GFR  (NO RACE VARIABLE): >60 ML/MIN/1.73 M^2
GLUCOSE SERPL-MCNC: 92 MG/DL (ref 70–110)
HCT VFR BLD AUTO: 43.8 % (ref 40–54)
HDLC SERPL-MCNC: 42 MG/DL (ref 40–75)
HDLC SERPL: 32.3 % (ref 20–50)
HGB BLD-MCNC: 14.2 G/DL (ref 14–18)
IMM GRANULOCYTES # BLD AUTO: 0.02 K/UL (ref 0–0.04)
IMM GRANULOCYTES NFR BLD AUTO: 0.4 % (ref 0–0.5)
LDLC SERPL CALC-MCNC: 67.6 MG/DL (ref 63–159)
LYMPHOCYTES # BLD AUTO: 1.6 K/UL (ref 1–4.8)
LYMPHOCYTES NFR BLD: 29.3 % (ref 18–48)
MCH RBC QN AUTO: 31.3 PG (ref 27–31)
MCHC RBC AUTO-ENTMCNC: 32.4 G/DL (ref 32–36)
MCV RBC AUTO: 97 FL (ref 82–98)
MONOCYTES # BLD AUTO: 0.3 K/UL (ref 0.3–1)
MONOCYTES NFR BLD: 6.2 % (ref 4–15)
NEUTROPHILS # BLD AUTO: 3.2 K/UL (ref 1.8–7.7)
NEUTROPHILS NFR BLD: 59.9 % (ref 38–73)
NONHDLC SERPL-MCNC: 88 MG/DL
NRBC BLD-RTO: 0 /100 WBC
PLATELET # BLD AUTO: 216 K/UL (ref 150–450)
PMV BLD AUTO: 11.1 FL (ref 9.2–12.9)
POTASSIUM SERPL-SCNC: 4.6 MMOL/L (ref 3.5–5.1)
PROT SERPL-MCNC: 7 G/DL (ref 6–8.4)
RBC # BLD AUTO: 4.53 M/UL (ref 4.6–6.2)
SODIUM SERPL-SCNC: 142 MMOL/L (ref 136–145)
T4 FREE SERPL-MCNC: 0.96 NG/DL (ref 0.71–1.51)
TRIGL SERPL-MCNC: 102 MG/DL (ref 30–150)
TSH SERPL DL<=0.005 MIU/L-ACNC: 3.39 UIU/ML (ref 0.4–4)
VIT B12 SERPL-MCNC: 737 PG/ML (ref 210–950)
WBC # BLD AUTO: 5.29 K/UL (ref 3.9–12.7)

## 2023-02-01 PROCEDURE — 99999 PR PBB SHADOW E&M-EST. PATIENT-LVL IV: ICD-10-PCS | Mod: PBBFAC,,, | Performed by: FAMILY MEDICINE

## 2023-02-01 PROCEDURE — 84439 ASSAY OF FREE THYROXINE: CPT | Performed by: FAMILY MEDICINE

## 2023-02-01 PROCEDURE — 1159F PR MEDICATION LIST DOCUMENTED IN MEDICAL RECORD: ICD-10-PCS | Mod: CPTII,S$GLB,, | Performed by: FAMILY MEDICINE

## 2023-02-01 PROCEDURE — 84443 ASSAY THYROID STIM HORMONE: CPT | Performed by: FAMILY MEDICINE

## 2023-02-01 PROCEDURE — 3074F PR MOST RECENT SYSTOLIC BLOOD PRESSURE < 130 MM HG: ICD-10-PCS | Mod: CPTII,S$GLB,, | Performed by: FAMILY MEDICINE

## 2023-02-01 PROCEDURE — 99214 OFFICE O/P EST MOD 30 MIN: CPT | Mod: S$GLB,,, | Performed by: FAMILY MEDICINE

## 2023-02-01 PROCEDURE — 99999 PR PBB SHADOW E&M-EST. PATIENT-LVL IV: CPT | Mod: PBBFAC,,, | Performed by: FAMILY MEDICINE

## 2023-02-01 PROCEDURE — 82607 VITAMIN B-12: CPT | Performed by: FAMILY MEDICINE

## 2023-02-01 PROCEDURE — 3008F PR BODY MASS INDEX (BMI) DOCUMENTED: ICD-10-PCS | Mod: CPTII,S$GLB,, | Performed by: FAMILY MEDICINE

## 2023-02-01 PROCEDURE — 3074F SYST BP LT 130 MM HG: CPT | Mod: CPTII,S$GLB,, | Performed by: FAMILY MEDICINE

## 2023-02-01 PROCEDURE — 80053 COMPREHEN METABOLIC PANEL: CPT | Performed by: FAMILY MEDICINE

## 2023-02-01 PROCEDURE — 1101F PT FALLS ASSESS-DOCD LE1/YR: CPT | Mod: CPTII,S$GLB,, | Performed by: FAMILY MEDICINE

## 2023-02-01 PROCEDURE — 3008F BODY MASS INDEX DOCD: CPT | Mod: CPTII,S$GLB,, | Performed by: FAMILY MEDICINE

## 2023-02-01 PROCEDURE — 1126F PR PAIN SEVERITY QUANTIFIED, NO PAIN PRESENT: ICD-10-PCS | Mod: CPTII,S$GLB,, | Performed by: FAMILY MEDICINE

## 2023-02-01 PROCEDURE — 3078F DIAST BP <80 MM HG: CPT | Mod: CPTII,S$GLB,, | Performed by: FAMILY MEDICINE

## 2023-02-01 PROCEDURE — 3288F PR FALLS RISK ASSESSMENT DOCUMENTED: ICD-10-PCS | Mod: CPTII,S$GLB,, | Performed by: FAMILY MEDICINE

## 2023-02-01 PROCEDURE — 84153 ASSAY OF PSA TOTAL: CPT | Performed by: FAMILY MEDICINE

## 2023-02-01 PROCEDURE — 3078F PR MOST RECENT DIASTOLIC BLOOD PRESSURE < 80 MM HG: ICD-10-PCS | Mod: CPTII,S$GLB,, | Performed by: FAMILY MEDICINE

## 2023-02-01 PROCEDURE — 3288F FALL RISK ASSESSMENT DOCD: CPT | Mod: CPTII,S$GLB,, | Performed by: FAMILY MEDICINE

## 2023-02-01 PROCEDURE — 80061 LIPID PANEL: CPT | Performed by: FAMILY MEDICINE

## 2023-02-01 PROCEDURE — 1126F AMNT PAIN NOTED NONE PRSNT: CPT | Mod: CPTII,S$GLB,, | Performed by: FAMILY MEDICINE

## 2023-02-01 PROCEDURE — 1159F MED LIST DOCD IN RCRD: CPT | Mod: CPTII,S$GLB,, | Performed by: FAMILY MEDICINE

## 2023-02-01 PROCEDURE — 99214 PR OFFICE/OUTPT VISIT, EST, LEVL IV, 30-39 MIN: ICD-10-PCS | Mod: S$GLB,,, | Performed by: FAMILY MEDICINE

## 2023-02-01 PROCEDURE — 85025 COMPLETE CBC W/AUTO DIFF WBC: CPT | Performed by: FAMILY MEDICINE

## 2023-02-01 PROCEDURE — 36415 COLL VENOUS BLD VENIPUNCTURE: CPT | Performed by: FAMILY MEDICINE

## 2023-02-01 PROCEDURE — 1101F PR PT FALLS ASSESS DOC 0-1 FALLS W/OUT INJ PAST YR: ICD-10-PCS | Mod: CPTII,S$GLB,, | Performed by: FAMILY MEDICINE

## 2023-02-01 RX ORDER — LEVOTHYROXINE SODIUM 88 UG/1
TABLET ORAL
Qty: 90 TABLET | Refills: 3 | Status: SHIPPED | OUTPATIENT
Start: 2023-02-01

## 2023-02-01 RX ORDER — ROSUVASTATIN CALCIUM 10 MG/1
10 TABLET, COATED ORAL NIGHTLY
Qty: 90 TABLET | Refills: 3 | Status: SHIPPED | OUTPATIENT
Start: 2023-02-01 | End: 2024-03-18

## 2023-02-01 NOTE — PROGRESS NOTES
Subjective:       Patient ID: zEio Jaems is a 72 y.o. male.    Chief Complaint: Annual Exam    Annual exam.  Followed by cardiology and neurology medical history of hypothyroidism cardiomegaly hypertension hyperlipidemia B12 deficiency benign essential tremor chronic atrial fibrillation LVH nonischemic cardiomyopathy.  He has had 6 months duration of urine frequency urgency.  Denies dysuria hematuria.  He denies headache chest pain palpitations shortness of breath or edema.    Review of Systems   Constitutional:  Negative for activity change, appetite change, diaphoresis, fatigue and unexpected weight change.   HENT:  Negative for congestion.    Respiratory:  Negative for cough, chest tightness, shortness of breath and wheezing.    Cardiovascular:  Negative for chest pain, palpitations and leg swelling.   Gastrointestinal:  Negative for abdominal distention, abdominal pain, constipation, diarrhea and nausea.   Genitourinary:  Positive for frequency and urgency. Negative for difficulty urinating, dysuria and hematuria.   Musculoskeletal:  Positive for arthralgias.   Neurological:  Negative for dizziness, weakness, light-headedness and headaches.     Objective:      Physical Exam  Constitutional:       General: He is not in acute distress.     Appearance: He is not ill-appearing or diaphoretic.   HENT:      Right Ear: Tympanic membrane and ear canal normal.      Left Ear: Tympanic membrane and ear canal normal.      Nose: Nose normal.   Eyes:      Conjunctiva/sclera: Conjunctivae normal.   Neck:      Comments: 2+ carotids normal thyroid  Cardiovascular:      Rate and Rhythm: Normal rate and regular rhythm.      Heart sounds: No murmur heard.    No gallop.   Pulmonary:      Effort: Pulmonary effort is normal. No respiratory distress.      Breath sounds: No wheezing, rhonchi or rales.   Abdominal:      General: There is no distension.      Palpations: Abdomen is soft. There is no mass.      Tenderness: There is no  abdominal tenderness.   Genitourinary:     Comments: Rectum is tight not able to completely evaluate prostate  Lymphadenopathy:      Cervical: No cervical adenopathy.   Skin:     General: Skin is warm and dry.      Coloration: Skin is not pale.      Findings: No erythema.   Neurological:      Mental Status: He is alert and oriented to person, place, and time.   Psychiatric:         Mood and Affect: Mood normal.         Behavior: Behavior normal.         Thought Content: Thought content normal.         Judgment: Judgment normal.       Lab Visit on 08/18/2022   Component Date Value Ref Range Status    WBC 08/18/2022 7.97  3.90 - 12.70 K/uL Final    RBC 08/18/2022 4.37 (L)  4.60 - 6.20 M/uL Final    Hemoglobin 08/18/2022 14.4  14.0 - 18.0 g/dL Final    Hematocrit 08/18/2022 43.0  40.0 - 54.0 % Final    MCV 08/18/2022 98  82 - 98 fL Final    MCH 08/18/2022 33.0 (H)  27.0 - 31.0 pg Final    MCHC 08/18/2022 33.5  32.0 - 36.0 g/dL Final    RDW 08/18/2022 12.6  11.5 - 14.5 % Final    Platelets 08/18/2022 258  150 - 450 K/uL Final    MPV 08/18/2022 10.8  9.2 - 12.9 fL Final    Immature Granulocytes 08/18/2022 0.3  0.0 - 0.5 % Final    Gran # (ANC) 08/18/2022 5.4  1.8 - 7.7 K/uL Final    Immature Grans (Abs) 08/18/2022 0.02  0.00 - 0.04 K/uL Final    Lymph # 08/18/2022 1.7  1.0 - 4.8 K/uL Final    Mono # 08/18/2022 0.6  0.3 - 1.0 K/uL Final    Eos # 08/18/2022 0.2  0.0 - 0.5 K/uL Final    Baso # 08/18/2022 0.03  0.00 - 0.20 K/uL Final    nRBC 08/18/2022 0  0 /100 WBC Final    Gran % 08/18/2022 68.2  38.0 - 73.0 % Final    Lymph % 08/18/2022 21.2  18.0 - 48.0 % Final    Mono % 08/18/2022 7.9  4.0 - 15.0 % Final    Eosinophil % 08/18/2022 2.0  0.0 - 8.0 % Final    Basophil % 08/18/2022 0.4  0.0 - 1.9 % Final    Differential Method 08/18/2022 Automated   Final    Sodium 08/18/2022 140  136 - 145 mmol/L Final    Potassium 08/18/2022 4.9  3.5 - 5.1 mmol/L Final    Chloride 08/18/2022 105  95 - 110 mmol/L Final    CO2 08/18/2022 25   23 - 29 mmol/L Final    Glucose 08/18/2022 90  70 - 110 mg/dL Final    BUN 08/18/2022 13  8 - 23 mg/dL Final    Creatinine 08/18/2022 0.9  0.5 - 1.4 mg/dL Final    Calcium 08/18/2022 9.4  8.7 - 10.5 mg/dL Final    Anion Gap 08/18/2022 10  8 - 16 mmol/L Final    eGFR 08/18/2022 >60.0  >60 mL/min/1.73 m^2 Final     Assessment:       1. Annual physical exam    2. Non-ischemic cardiomyopathy    3. Atrial fibrillation, chronic    4. Essential hypertension    5. Dyslipidemia    6. LVH (left ventricular hypertrophy)    7. Benign essential tremor    8. Subclinical hypothyroidism    9. Hypothyroidism, unspecified type    10. Screening for prostate cancer    11. B12 deficiency    12. Benign prostatic hyperplasia with urinary frequency          Plan:   Blood pressure controlled lab was ordered he declined Shingrix medications reviewed and refilled urology follow-up probable BPH.      Annual physical exam    Non-ischemic cardiomyopathy    Atrial fibrillation, chronic    Essential hypertension  -     CBC Auto Differential; Future; Expected date: 02/01/2023  -     Urinalysis; Future; Expected date: 02/01/2023  -     Comprehensive Metabolic Panel; Future; Expected date: 02/01/2023  -     Lipid Panel; Future; Expected date: 02/01/2023    Dyslipidemia  -     rosuvastatin (CRESTOR) 10 MG tablet; Take 1 tablet (10 mg total) by mouth every evening.  Dispense: 90 tablet; Refill: 3    LVH (left ventricular hypertrophy)    Benign essential tremor    Subclinical hypothyroidism    Hypothyroidism, unspecified type  -     TSH; Future; Expected date: 02/01/2023  -     T4, Free; Future; Expected date: 02/01/2023  -     levothyroxine (SYNTHROID) 88 MCG tablet; TAKE 1 TABLET BEFORE BREAKFAST  Dispense: 90 tablet; Refill: 3    Screening for prostate cancer  -     PSA, Screening; Future; Expected date: 02/01/2023    B12 deficiency  -     Vitamin B12; Future; Expected date: 02/01/2023    Benign prostatic hyperplasia with urinary frequency  -      Ambulatory referral/consult to Urology; Future; Expected date: 02/08/2023

## 2023-02-07 ENCOUNTER — TELEPHONE (OUTPATIENT)
Dept: INTERNAL MEDICINE | Facility: CLINIC | Age: 73
End: 2023-02-07
Payer: MEDICARE

## 2023-02-13 ENCOUNTER — OFFICE VISIT (OUTPATIENT)
Dept: UROLOGY | Facility: CLINIC | Age: 73
End: 2023-02-13
Payer: MEDICARE

## 2023-02-13 VITALS — HEIGHT: 70 IN | BODY MASS INDEX: 28.79 KG/M2 | RESPIRATION RATE: 18 BRPM

## 2023-02-13 DIAGNOSIS — N40.1 BENIGN PROSTATIC HYPERPLASIA WITH URINARY FREQUENCY: ICD-10-CM

## 2023-02-13 DIAGNOSIS — R35.0 BENIGN PROSTATIC HYPERPLASIA WITH URINARY FREQUENCY: ICD-10-CM

## 2023-02-13 PROBLEM — L84 CORN OF FOOT: Status: RESOLVED | Noted: 2022-02-01 | Resolved: 2023-02-13

## 2023-02-13 PROBLEM — R94.31 ABNORMAL ECG: Status: RESOLVED | Noted: 2019-07-26 | Resolved: 2023-02-13

## 2023-02-13 PROBLEM — M17.10 ARTHRITIS OF KNEE: Status: RESOLVED | Noted: 2017-03-13 | Resolved: 2023-02-13

## 2023-02-13 PROBLEM — Z00.00 ANNUAL PHYSICAL EXAM: Status: RESOLVED | Noted: 2018-04-24 | Resolved: 2023-02-13

## 2023-02-13 PROBLEM — R60.9 EDEMA: Status: RESOLVED | Noted: 2018-10-24 | Resolved: 2023-02-13

## 2023-02-13 PROCEDURE — 3008F BODY MASS INDEX DOCD: CPT | Mod: CPTII,S$GLB,, | Performed by: NURSE PRACTITIONER

## 2023-02-13 PROCEDURE — 99204 PR OFFICE/OUTPT VISIT, NEW, LEVL IV, 45-59 MIN: ICD-10-PCS | Mod: S$GLB,,, | Performed by: NURSE PRACTITIONER

## 2023-02-13 PROCEDURE — 99204 OFFICE O/P NEW MOD 45 MIN: CPT | Mod: S$GLB,,, | Performed by: NURSE PRACTITIONER

## 2023-02-13 PROCEDURE — 99999 PR PBB SHADOW E&M-EST. PATIENT-LVL II: ICD-10-PCS | Mod: PBBFAC,,, | Performed by: NURSE PRACTITIONER

## 2023-02-13 PROCEDURE — 3008F PR BODY MASS INDEX (BMI) DOCUMENTED: ICD-10-PCS | Mod: CPTII,S$GLB,, | Performed by: NURSE PRACTITIONER

## 2023-02-13 PROCEDURE — 99999 PR PBB SHADOW E&M-EST. PATIENT-LVL II: CPT | Mod: PBBFAC,,, | Performed by: NURSE PRACTITIONER

## 2023-02-13 RX ORDER — TAMSULOSIN HYDROCHLORIDE 0.4 MG/1
0.4 CAPSULE ORAL DAILY
Qty: 30 CAPSULE | Refills: 11 | Status: SHIPPED | OUTPATIENT
Start: 2023-02-13 | End: 2023-03-13 | Stop reason: SDUPTHER

## 2023-02-13 NOTE — PROGRESS NOTES
Chief Complaint:   BPH    HPI:   Patient is a 72-year-old male that is presenting with an increase in nocturia, 3-4 times nightly in a slow daytime urinary stream.  No BPH meds.  Recent PSA was 0.20 urine in clinic is negative and PVR is 22 mL.No abd/pelvic pain and no exac/rel factors.  No hematuria.  No urolithiasis.      Allergies:  Patient has no known allergies.    Medications:  has a current medication list which includes the following prescription(s): aspirin, cholecalciferol (vitamin d3), cyanocobalamin, levothyroxine, lisinopril, metoprolol succinate, multivitamin, primidone, rosuvastatin, and tamsulosin.    Review of Systems:  General: No fever, chills, fatigability, or weight loss.  Skin: No rashes, itching, or changes in color or texture of skin.  Chest: Denies DENNISON, cyanosis, wheezing, cough, and sputum production.  Abdomen: Appetite fine. No weight loss. Denies diarrhea, abdominal pain, hematemesis, or blood in stool.  Musculoskeletal: No joint stiffness or swelling. Denies back pain.  : As above.  All other review of systems negative.    PMH:   has a past medical history of Atrial fibrillation, chronic, Chronic systolic congestive heart failure (5/4/2018), Colon polyp (04,15), Essential hypertension (6/14/2017), and Subclinical hypothyroidism.    PSH:   has a past surgical history that includes Colonoscopy (2004,5/15) and Left heart catheterization (Left, 6/21/2018).    FamHx: family history includes Cataracts in his mother; Emphysema in his father; Heart disease in his father; Hypertension in his mother; Lung cancer in his mother; Obesity in his mother.    SocHx:  reports that he has never smoked. He has never used smokeless tobacco. He reports current alcohol use. He reports that he does not use drugs.      Physical Exam:  Vitals:    02/13/23 1102   Resp: 18     General: A&Ox3, no apparent distress, no deformities  Neck: No masses, normal thyroid  Lungs: normal inspiration, no use of accessory  muscles  Heart: normal pulse, no arrhythmias  Abdomen: Soft, NT, ND, no masses, no hernias, no hepatosplenomegaly  Lymphatic: Neck and groin nodes negative    Labs/Studies:   See HPI   Latest Reference Range & Units 04/24/18 13:51 11/07/19 09:17 02/18/21 09:18 02/01/23 08:31   PSA, Screen 0.00 - 4.00 ng/mL 0.34 0.31 0.21 0.20     Impression/Plan:   BPH  Is patient was prescribed tamsulosin and will return to clinic in 2 months for re-evaluation.

## 2023-03-13 ENCOUNTER — OFFICE VISIT (OUTPATIENT)
Dept: UROLOGY | Facility: CLINIC | Age: 73
End: 2023-03-13
Payer: MEDICARE

## 2023-03-13 ENCOUNTER — PATIENT MESSAGE (OUTPATIENT)
Dept: CARDIOLOGY | Facility: CLINIC | Age: 73
End: 2023-03-13
Payer: MEDICARE

## 2023-03-13 VITALS
BODY MASS INDEX: 28.79 KG/M2 | WEIGHT: 200.63 LBS | TEMPERATURE: 98 F | SYSTOLIC BLOOD PRESSURE: 140 MMHG | RESPIRATION RATE: 18 BRPM | DIASTOLIC BLOOD PRESSURE: 89 MMHG | HEART RATE: 81 BPM

## 2023-03-13 DIAGNOSIS — R35.0 BENIGN PROSTATIC HYPERPLASIA WITH URINARY FREQUENCY: ICD-10-CM

## 2023-03-13 DIAGNOSIS — I10 ESSENTIAL HYPERTENSION: ICD-10-CM

## 2023-03-13 DIAGNOSIS — Z12.5 PROSTATE CANCER SCREENING: Primary | ICD-10-CM

## 2023-03-13 DIAGNOSIS — N40.1 BENIGN PROSTATIC HYPERPLASIA WITH URINARY FREQUENCY: ICD-10-CM

## 2023-03-13 PROCEDURE — 1101F PT FALLS ASSESS-DOCD LE1/YR: CPT | Mod: CPTII,S$GLB,, | Performed by: NURSE PRACTITIONER

## 2023-03-13 PROCEDURE — 1159F MED LIST DOCD IN RCRD: CPT | Mod: CPTII,S$GLB,, | Performed by: NURSE PRACTITIONER

## 2023-03-13 PROCEDURE — 3079F PR MOST RECENT DIASTOLIC BLOOD PRESSURE 80-89 MM HG: ICD-10-PCS | Mod: CPTII,S$GLB,, | Performed by: NURSE PRACTITIONER

## 2023-03-13 PROCEDURE — 1159F PR MEDICATION LIST DOCUMENTED IN MEDICAL RECORD: ICD-10-PCS | Mod: CPTII,S$GLB,, | Performed by: NURSE PRACTITIONER

## 2023-03-13 PROCEDURE — 1101F PR PT FALLS ASSESS DOC 0-1 FALLS W/OUT INJ PAST YR: ICD-10-PCS | Mod: CPTII,S$GLB,, | Performed by: NURSE PRACTITIONER

## 2023-03-13 PROCEDURE — 3077F SYST BP >= 140 MM HG: CPT | Mod: CPTII,S$GLB,, | Performed by: NURSE PRACTITIONER

## 2023-03-13 PROCEDURE — 3288F PR FALLS RISK ASSESSMENT DOCUMENTED: ICD-10-PCS | Mod: CPTII,S$GLB,, | Performed by: NURSE PRACTITIONER

## 2023-03-13 PROCEDURE — 99999 PR PBB SHADOW E&M-EST. PATIENT-LVL III: ICD-10-PCS | Mod: PBBFAC,,, | Performed by: NURSE PRACTITIONER

## 2023-03-13 PROCEDURE — 3079F DIAST BP 80-89 MM HG: CPT | Mod: CPTII,S$GLB,, | Performed by: NURSE PRACTITIONER

## 2023-03-13 PROCEDURE — 3008F BODY MASS INDEX DOCD: CPT | Mod: CPTII,S$GLB,, | Performed by: NURSE PRACTITIONER

## 2023-03-13 PROCEDURE — 3077F PR MOST RECENT SYSTOLIC BLOOD PRESSURE >= 140 MM HG: ICD-10-PCS | Mod: CPTII,S$GLB,, | Performed by: NURSE PRACTITIONER

## 2023-03-13 PROCEDURE — 3288F FALL RISK ASSESSMENT DOCD: CPT | Mod: CPTII,S$GLB,, | Performed by: NURSE PRACTITIONER

## 2023-03-13 PROCEDURE — 99999 PR PBB SHADOW E&M-EST. PATIENT-LVL III: CPT | Mod: PBBFAC,,, | Performed by: NURSE PRACTITIONER

## 2023-03-13 PROCEDURE — 99214 PR OFFICE/OUTPT VISIT, EST, LEVL IV, 30-39 MIN: ICD-10-PCS | Mod: S$GLB,,, | Performed by: NURSE PRACTITIONER

## 2023-03-13 PROCEDURE — 99214 OFFICE O/P EST MOD 30 MIN: CPT | Mod: S$GLB,,, | Performed by: NURSE PRACTITIONER

## 2023-03-13 PROCEDURE — 3008F PR BODY MASS INDEX (BMI) DOCUMENTED: ICD-10-PCS | Mod: CPTII,S$GLB,, | Performed by: NURSE PRACTITIONER

## 2023-03-13 RX ORDER — TAMSULOSIN HYDROCHLORIDE 0.4 MG/1
0.4 CAPSULE ORAL DAILY
Qty: 90 CAPSULE | Refills: 4 | Status: SHIPPED | OUTPATIENT
Start: 2023-03-13 | End: 2023-04-25

## 2023-03-13 RX ORDER — LISINOPRIL 10 MG/1
10 TABLET ORAL DAILY
Qty: 90 TABLET | Refills: 3 | Status: SHIPPED | OUTPATIENT
Start: 2023-03-13 | End: 2024-01-19 | Stop reason: SDUPTHER

## 2023-03-13 RX ORDER — TAMSULOSIN HYDROCHLORIDE 0.4 MG/1
0.4 CAPSULE ORAL DAILY
Qty: 30 CAPSULE | Refills: 0 | Status: SHIPPED | OUTPATIENT
Start: 2023-03-13 | End: 2023-03-28 | Stop reason: SDUPTHER

## 2023-03-13 NOTE — PATIENT INSTRUCTIONS
BPH   Remain on Flomax    2. Prostate cancer screening   RTC in 12 months for LEANNE and PSA ( schedule this lab for next year)

## 2023-03-13 NOTE — PROGRESS NOTES
Chief Complaint:   BPH    HPI:   Patient is a 72-year-old male that is presenting as a follow-up to tamsulosin.  Patient was started on tamsulosin secondary to complaints of nocturia and a slow urinary stream.  Reports that nocturia has decreased to once nightly and daytime stream is strong.  Denies adverse side effects to tamsulosin.  Recent PSA was normal.  Urine in clinic is negative and PVR was 19 mL.  02/13/2023  Patient is a 72-year-old male that is presenting with an increase in nocturia, 3-4 times nightly in a slow daytime urinary stream.  No BPH meds.  Recent PSA was 0.20 urine in clinic is negative and PVR is 22 mL.No abd/pelvic pain and no exac/rel factors.  No hematuria.  No urolithiasis.       Allergies:  Patient has no known allergies.    Medications:  has a current medication list which includes the following prescription(s): aspirin, cholecalciferol (vitamin d3), cyanocobalamin, levothyroxine, lisinopril, metoprolol succinate, multivitamin, primidone, rosuvastatin, and tamsulosin.    Review of Systems:  General: No fever, chills, fatigability, or weight loss.  Skin: No rashes, itching, or changes in color or texture of skin.  Chest: Denies DENNISON, cyanosis, wheezing, cough, and sputum production.  Abdomen: Appetite fine. No weight loss. Denies diarrhea, abdominal pain, hematemesis, or blood in stool.  Musculoskeletal: No joint stiffness or swelling. Denies back pain.  : As above.  All other review of systems negative.    PMH:   has a past medical history of Atrial fibrillation, chronic, Chronic systolic congestive heart failure (5/4/2018), Colon polyp (04,15), Essential hypertension (6/14/2017), and Subclinical hypothyroidism.    PSH:   has a past surgical history that includes Colonoscopy (2004,5/15) and Left heart catheterization (Left, 6/21/2018).    FamHx: family history includes Cataracts in his mother; Emphysema in his father; Heart disease in his father; Hypertension in his mother; Lung cancer  in his mother; Obesity in his mother.    SocHx:  reports that he has never smoked. He has never used smokeless tobacco. He reports current alcohol use. He reports that he does not use drugs.      Physical Exam:  General: A&Ox3, no apparent distress, no deformities  Neck: No masses, normal thyroid  Lungs: normal inspiration, no use of accessory muscles  Heart: normal pulse, no arrhythmias  Abdomen: Soft, NT, ND, no masses, no hernias, no hepatosplenomegaly  Lymphatic: Neck and groin nodes negative  Labs/Studies:    Latest Reference Range & Units 05/20/16 14:48 04/24/18 13:51 11/07/19 09:17 02/18/21 09:18 02/01/23 08:31   PSA, Screen 0.00 - 4.00 ng/mL 0.39 0.34 0.31 0.21 0.20       Impression/Plan:   1. BPH  Patient to remain on current dose of tamsulosin.  Patient has not had a complete resolve of BPH symptoms without reported adverse side effects.    2. Prostate cancer screening  Patient to return to clinic in 12 months for LEANNE and PSA assessment.

## 2023-03-29 ENCOUNTER — LAB VISIT (OUTPATIENT)
Dept: LAB | Facility: HOSPITAL | Age: 73
End: 2023-03-29
Attending: INTERNAL MEDICINE
Payer: MEDICARE

## 2023-03-29 ENCOUNTER — OFFICE VISIT (OUTPATIENT)
Dept: CARDIOLOGY | Facility: CLINIC | Age: 73
End: 2023-03-29
Payer: MEDICARE

## 2023-03-29 VITALS
WEIGHT: 199.06 LBS | SYSTOLIC BLOOD PRESSURE: 120 MMHG | BODY MASS INDEX: 28.5 KG/M2 | DIASTOLIC BLOOD PRESSURE: 84 MMHG | RESPIRATION RATE: 16 BRPM | HEIGHT: 70 IN | HEART RATE: 75 BPM | OXYGEN SATURATION: 96 %

## 2023-03-29 DIAGNOSIS — I50.42 CHRONIC COMBINED SYSTOLIC AND DIASTOLIC CONGESTIVE HEART FAILURE: ICD-10-CM

## 2023-03-29 DIAGNOSIS — E78.5 DYSLIPIDEMIA: ICD-10-CM

## 2023-03-29 DIAGNOSIS — R94.31 ABNORMAL ECG: ICD-10-CM

## 2023-03-29 DIAGNOSIS — I34.0 NONRHEUMATIC MITRAL VALVE REGURGITATION: ICD-10-CM

## 2023-03-29 DIAGNOSIS — I10 ESSENTIAL HYPERTENSION: ICD-10-CM

## 2023-03-29 DIAGNOSIS — I51.7 LAE (LEFT ATRIAL ENLARGEMENT): ICD-10-CM

## 2023-03-29 DIAGNOSIS — I51.7 LVH (LEFT VENTRICULAR HYPERTROPHY): ICD-10-CM

## 2023-03-29 DIAGNOSIS — I42.8 NON-ISCHEMIC CARDIOMYOPATHY: ICD-10-CM

## 2023-03-29 DIAGNOSIS — E66.3 OVERWEIGHT (BMI 25.0-29.9): ICD-10-CM

## 2023-03-29 DIAGNOSIS — I42.8 OTHER CARDIOMYOPATHY: ICD-10-CM

## 2023-03-29 DIAGNOSIS — E78.6 LOW HDL (UNDER 40): ICD-10-CM

## 2023-03-29 DIAGNOSIS — I48.20 ATRIAL FIBRILLATION, CHRONIC: ICD-10-CM

## 2023-03-29 DIAGNOSIS — I48.20 ATRIAL FIBRILLATION, CHRONIC: Primary | ICD-10-CM

## 2023-03-29 LAB
ALBUMIN SERPL BCP-MCNC: 4.1 G/DL (ref 3.5–5.2)
ALP SERPL-CCNC: 57 U/L (ref 55–135)
ALT SERPL W/O P-5'-P-CCNC: 13 U/L (ref 10–44)
ANION GAP SERPL CALC-SCNC: 5 MMOL/L (ref 8–16)
AST SERPL-CCNC: 20 U/L (ref 10–40)
BILIRUB SERPL-MCNC: 0.7 MG/DL (ref 0.1–1)
BNP SERPL-MCNC: 68 PG/ML (ref 0–99)
BUN SERPL-MCNC: 17 MG/DL (ref 8–23)
CALCIUM SERPL-MCNC: 9.5 MG/DL (ref 8.7–10.5)
CHLORIDE SERPL-SCNC: 103 MMOL/L (ref 95–110)
CO2 SERPL-SCNC: 30 MMOL/L (ref 23–29)
CREAT SERPL-MCNC: 0.7 MG/DL (ref 0.5–1.4)
EST. GFR  (NO RACE VARIABLE): >60 ML/MIN/1.73 M^2
GLUCOSE SERPL-MCNC: 91 MG/DL (ref 70–110)
POTASSIUM SERPL-SCNC: 5.3 MMOL/L (ref 3.5–5.1)
PROT SERPL-MCNC: 7 G/DL (ref 6–8.4)
SODIUM SERPL-SCNC: 138 MMOL/L (ref 136–145)

## 2023-03-29 PROCEDURE — 1126F PR PAIN SEVERITY QUANTIFIED, NO PAIN PRESENT: ICD-10-PCS | Mod: CPTII,S$GLB,, | Performed by: INTERNAL MEDICINE

## 2023-03-29 PROCEDURE — 3074F PR MOST RECENT SYSTOLIC BLOOD PRESSURE < 130 MM HG: ICD-10-PCS | Mod: CPTII,S$GLB,, | Performed by: INTERNAL MEDICINE

## 2023-03-29 PROCEDURE — 36415 COLL VENOUS BLD VENIPUNCTURE: CPT | Performed by: INTERNAL MEDICINE

## 2023-03-29 PROCEDURE — 99999 PR PBB SHADOW E&M-EST. PATIENT-LVL III: CPT | Mod: PBBFAC,,, | Performed by: INTERNAL MEDICINE

## 2023-03-29 PROCEDURE — 99214 PR OFFICE/OUTPT VISIT, EST, LEVL IV, 30-39 MIN: ICD-10-PCS | Mod: S$GLB,,, | Performed by: INTERNAL MEDICINE

## 2023-03-29 PROCEDURE — 80053 COMPREHEN METABOLIC PANEL: CPT | Performed by: INTERNAL MEDICINE

## 2023-03-29 PROCEDURE — 83880 ASSAY OF NATRIURETIC PEPTIDE: CPT | Performed by: INTERNAL MEDICINE

## 2023-03-29 PROCEDURE — 3008F PR BODY MASS INDEX (BMI) DOCUMENTED: ICD-10-PCS | Mod: CPTII,S$GLB,, | Performed by: INTERNAL MEDICINE

## 2023-03-29 PROCEDURE — 1160F PR REVIEW ALL MEDS BY PRESCRIBER/CLIN PHARMACIST DOCUMENTED: ICD-10-PCS | Mod: CPTII,S$GLB,, | Performed by: INTERNAL MEDICINE

## 2023-03-29 PROCEDURE — 1159F MED LIST DOCD IN RCRD: CPT | Mod: CPTII,S$GLB,, | Performed by: INTERNAL MEDICINE

## 2023-03-29 PROCEDURE — 99999 PR PBB SHADOW E&M-EST. PATIENT-LVL III: ICD-10-PCS | Mod: PBBFAC,,, | Performed by: INTERNAL MEDICINE

## 2023-03-29 PROCEDURE — 3074F SYST BP LT 130 MM HG: CPT | Mod: CPTII,S$GLB,, | Performed by: INTERNAL MEDICINE

## 2023-03-29 PROCEDURE — 99214 OFFICE O/P EST MOD 30 MIN: CPT | Mod: S$GLB,,, | Performed by: INTERNAL MEDICINE

## 2023-03-29 PROCEDURE — 1126F AMNT PAIN NOTED NONE PRSNT: CPT | Mod: CPTII,S$GLB,, | Performed by: INTERNAL MEDICINE

## 2023-03-29 PROCEDURE — 1159F PR MEDICATION LIST DOCUMENTED IN MEDICAL RECORD: ICD-10-PCS | Mod: CPTII,S$GLB,, | Performed by: INTERNAL MEDICINE

## 2023-03-29 PROCEDURE — 3079F PR MOST RECENT DIASTOLIC BLOOD PRESSURE 80-89 MM HG: ICD-10-PCS | Mod: CPTII,S$GLB,, | Performed by: INTERNAL MEDICINE

## 2023-03-29 PROCEDURE — 3079F DIAST BP 80-89 MM HG: CPT | Mod: CPTII,S$GLB,, | Performed by: INTERNAL MEDICINE

## 2023-03-29 PROCEDURE — 4010F ACE/ARB THERAPY RXD/TAKEN: CPT | Mod: CPTII,S$GLB,, | Performed by: INTERNAL MEDICINE

## 2023-03-29 PROCEDURE — 3008F BODY MASS INDEX DOCD: CPT | Mod: CPTII,S$GLB,, | Performed by: INTERNAL MEDICINE

## 2023-03-29 PROCEDURE — 1160F RVW MEDS BY RX/DR IN RCRD: CPT | Mod: CPTII,S$GLB,, | Performed by: INTERNAL MEDICINE

## 2023-03-29 PROCEDURE — 4010F PR ACE/ARB THEARPY RXD/TAKEN: ICD-10-PCS | Mod: CPTII,S$GLB,, | Performed by: INTERNAL MEDICINE

## 2023-03-29 NOTE — PROGRESS NOTES
Subjective:    Patient ID:  Ezio James is a 72 y.o. male who presents for evaluation of Congestive Heart Failure, Coronary Artery Disease, Atrial Fibrillation, Hyperlipidemia, and Hypertension        HPIPt presents for eval.   His current med conditions include  HTN, hyperlipidemia, CAD, LAE, MR, TR, CHF, LVH, non-ischemic cardiomyopathy, permanent a fib.   Nonsmoker.   Past hx pertinent for following:  He declined anticoagulation multiple times in past (coumadin and NOAC discussed, see prior notes), wanted to stay on asa.   2014 Echo 40 - 45%.  Stress MPI 2014 EF 35%.  S/p [facility] 6/18 for abnl stress test and to more definitively assess LVEF.  Cath showed widely patent coronary arteries (only luminal irregularities) and LVEF 50 - 55%  Echo 7/21 EF 35%, LAE, mild MR/TR.  Echo 2/22 EF 45%, LAE, BLAZE, mod MR, mild TR.  Ecg 8/18/22 a fib with controlled VR, low precordial R waves.  Now here.  No angina.  No dyspnea, pnd/orthopnea.  Some leg edema.  Stockings help.  Too much salt in diet at times.  Weight up a few pounds.  No syncope.  No TIA/CVA sxs.  HTN is controlled.  Lipids well controlled on statin tx.      Past Medical History:   Diagnosis Date    Atrial fibrillation, chronic     Chronic systolic congestive heart failure 5/4/2018    Colon polyp 04,15    Essential hypertension 6/14/2017    Subclinical hypothyroidism      Current Outpatient Medications   Medication Instructions    aspirin (ECOTRIN) 81 mg, Oral, Daily    cholecalciferol (vitamin D3) 5,000 Units, Oral, Daily    cyanocobalamin (VITAMIN B-12) 100 mcg, Oral, Daily    levothyroxine (SYNTHROID) 88 MCG tablet TAKE 1 TABLET BEFORE BREAKFAST    lisinopriL 10 mg, Oral, Daily    metoprolol succinate (TOPROL-XL) 50 MG 24 hr tablet TAKE 1 TABLET DAILY    multivitamin capsule 1 capsule, Oral, Daily    primidone (MYSOLINE) 100 mg, Oral, Nightly    rosuvastatin (CRESTOR) 10 mg, Oral, Nightly    tamsulosin (FLOMAX) 0.4 mg, Oral, Daily    tamsulosin (FLOMAX) 0.4 mg,  "Oral, Daily       Review of Systems   Constitutional: Positive for weight gain.   HENT: Negative.     Eyes: Negative.    Cardiovascular:  Positive for leg swelling.   Respiratory: Negative.     Endocrine: Negative.    Hematologic/Lymphatic: Negative.    Skin: Negative.    Musculoskeletal:  Positive for arthritis and joint pain.   Gastrointestinal: Negative.    Genitourinary: Negative.    Neurological: Negative.    Psychiatric/Behavioral: Negative.     Allergic/Immunologic: Negative.         /84 (BP Location: Right arm, Patient Position: Sitting, BP Method: Large (Manual))   Pulse 75   Resp 16   Ht 5' 10" (1.778 m)   Wt 90.3 kg (199 lb 1.2 oz)   SpO2 96%   BMI 28.56 kg/m²     Wt Readings from Last 3 Encounters:   03/29/23 90.3 kg (199 lb 1.2 oz)   03/13/23 91 kg (200 lb 9.9 oz)   02/01/23 91 kg (200 lb 9.9 oz)     Temp Readings from Last 3 Encounters:   03/13/23 98 °F (36.7 °C)   02/01/23 98.2 °F (36.8 °C) (Temporal)   08/16/22 98.4 °F (36.9 °C) (Tympanic)     BP Readings from Last 3 Encounters:   03/29/23 120/84   03/13/23 (!) 140/89   02/01/23 124/76     Pulse Readings from Last 3 Encounters:   03/29/23 75   03/13/23 81   02/01/23 83         Objective:    Physical Exam  Vitals and nursing note reviewed.   Constitutional:       Appearance: He is well-developed.   HENT:      Head: Normocephalic.   Neck:      Thyroid: No thyromegaly.      Vascular: No carotid bruit or JVD.   Cardiovascular:      Rate and Rhythm: Normal rate. Rhythm irregularly irregular.      Pulses:           Radial pulses are 2+ on the right side and 2+ on the left side.      Heart sounds: S1 normal and S2 normal. Heart sounds not distant. No midsystolic click and no opening snap. No murmur heard.    No friction rub. No S3 or S4 sounds.   Pulmonary:      Effort: Pulmonary effort is normal.      Breath sounds: Normal breath sounds. No wheezing or rales.   Abdominal:      General: Bowel sounds are normal. There is no distension or " abdominal bruit.      Palpations: Abdomen is soft. There is no mass.      Tenderness: There is no abdominal tenderness.   Musculoskeletal:      Cervical back: Neck supple.      Right lower leg: Edema (trace) present.      Left lower leg: Edema (trace) present.   Skin:     General: Skin is warm.   Neurological:      Mental Status: He is alert and oriented to person, place, and time.   Psychiatric:         Behavior: Behavior normal.       I have reviewed all pertinent labs and cardiac studies.      Chemistry        Component Value Date/Time     02/01/2023 0831    K 4.6 02/01/2023 0831     02/01/2023 0831    CO2 28 02/01/2023 0831    BUN 15 02/01/2023 0831    CREATININE 0.8 02/01/2023 0831    GLU 92 02/01/2023 0831        Component Value Date/Time    CALCIUM 9.5 02/01/2023 0831    ALKPHOS 54 (L) 02/01/2023 0831    AST 22 02/01/2023 0831    ALT 14 02/01/2023 0831    BILITOT 0.6 02/01/2023 0831    ESTGFRAFRICA >60.0 02/01/2022 1020    EGFRNONAA >60.0 02/01/2022 1020        Lab Results   Component Value Date    WBC 5.29 02/01/2023    HGB 14.2 02/01/2023    HCT 43.8 02/01/2023    MCV 97 02/01/2023     02/01/2023       No results found for: LABA1C, HGBA1C  Lab Results   Component Value Date    CHOL 130 02/01/2023    CHOL 124 02/01/2022    CHOL 120 02/18/2021     Lab Results   Component Value Date    HDL 42 02/01/2023    HDL 40 02/01/2022    HDL 41 02/18/2021     Lab Results   Component Value Date    LDLCALC 67.6 02/01/2023    LDLCALC 61.0 (L) 02/01/2022    LDLCALC 58.6 (L) 02/18/2021     Lab Results   Component Value Date    TRIG 102 02/01/2023    TRIG 115 02/01/2022    TRIG 102 02/18/2021     Lab Results   Component Value Date    CHOLHDL 32.3 02/01/2023    CHOLHDL 32.3 02/01/2022    CHOLHDL 34.2 02/18/2021           Assessment:       1. Atrial fibrillation, chronic    2. Abnormal ECG    3. Other cardiomyopathy    4. LVH (left ventricular hypertrophy)    5. Low HDL (under 40)    6. LAE (left atrial  enlargement)    7. Overweight (BMI 25.0-29.9)    8. Nonrheumatic mitral valve regurgitation    9. Non-ischemic cardiomyopathy    10. Essential hypertension    11. Dyslipidemia           Plan:             Stable cardiovascular conditions at present time on current medical treatment.  Reviewed all tests and above medical conditions with patient in detail and formulated treatment plan.  Continue optimal medical treatment for cardiovascular conditions.  Nonischemic cardiomyopathy: stable. Med tx.   Leg edema: cut back on salt, compression stockings, elevate legs. Check BNP, CMP, and echo.   Chronic a fib: rate controlled. Continue asa (has declined full anticoagulation in past on multiple discussions). Monitor.  Cardiac low salt diet advised.  Daily exercise encouraged, with the goal 30 +  minutes aerobic exercise as tolerated.  Maintaining healthy weight and weight loss goals (if needed) were discussed in clinic.  HTN: Stable. Need for BP control and HTN goals (if needed) were discussed and tx plan formulated.  Goal < 130/80.  Continue current HTN meds.   Hyperlipidemia:  Importance of optimal lipid control were discussed in detail as well as possible pharmacologic and lifestyle changes that may be needed.  Continue statin tx.   MR: stable. Echo.  Phone review.   F/u in 6 months.      I have reviewed all pertinent labs and cardiac studies independently. Plans and recommendations have been formulated under my direct supervision. All questions answered and patient voiced understanding.

## 2023-03-30 ENCOUNTER — TELEPHONE (OUTPATIENT)
Dept: INTERNAL MEDICINE | Facility: CLINIC | Age: 73
End: 2023-03-30
Payer: MEDICARE

## 2023-03-30 ENCOUNTER — NURSE TRIAGE (OUTPATIENT)
Dept: ADMINISTRATIVE | Facility: CLINIC | Age: 73
End: 2023-03-30
Payer: MEDICARE

## 2023-03-30 NOTE — TELEPHONE ENCOUNTER
Patient states he was not feeling well today so he took a home Covid test and was Covid +. Patient reports he felt feverish, temp  98.8. Patient denies difficulty breathing or SOB. Advised patient of dispo to see PCP within 24 hours. Verbalized understanding. Advised to call back if symptoms become worse or with further questions.      Reason for Disposition   [1] HIGH RISK for severe COVID complications (e.g., weak immune system, age > 64 years, obesity with BMI 30 or higher, pregnant, chronic lung disease or other chronic medical condition) AND [2] COVID symptoms (e.g., cough, fever)  (Exceptions: Already seen by PCP and no new or worsening symptoms.)    Additional Information   Negative: SEVERE difficulty breathing (e.g., struggling for each breath, speaks in single words)   Negative: Difficult to awaken or acting confused (e.g., disoriented, slurred speech)   Negative: Bluish (or gray) lips or face now   Negative: Shock suspected (e.g., cold/pale/clammy skin, too weak to stand, low BP, rapid pulse)   Negative: Sounds like a life-threatening emergency to the triager   Negative: SEVERE or constant chest pain or pressure  (Exception: Mild central chest pain, present only when coughing.)   Negative: MODERATE difficulty breathing (e.g., speaks in phrases, SOB even at rest, pulse 100-120)   Negative: [1] Headache AND [2] stiff neck (can't touch chin to chest)   Negative: Oxygen level (e.g., pulse oximetry) 90 percent or lower   Negative: Chest pain or pressure  (Exception: MILD central chest pain, present only when coughing)   Negative: Patient sounds very sick or weak to the triager   Negative: MILD difficulty breathing (e.g., minimal/no SOB at rest, SOB with walking, pulse <100)   Negative: Fever > 103 F (39.4 C)   Negative: [1] Fever > 101 F (38.3 C) AND [2] age > 60 years   Negative: [1] Fever > 100.0 F (37.8 C) AND [2] bedridden (e.g., CVA, chronic illness, recovering from surgery)   Negative: Oxygen level (e.g.,  pulse oximetry) 91 to 94 percent    Protocols used: Coronavirus (COVID-19) Diagnosed or [id number]

## 2023-03-30 NOTE — TELEPHONE ENCOUNTER
"----- Message from Pari Holbrook sent at 3/30/2023  4:51 PM CDT -----  "Type:  Patient Call Back    Who Called:PT    What is the reqeust in detail:Pt requesting call back took a at home test and it showed positive pt would like to know what he can take or need to do. Please advise     Can the clinic reply by [facility]?no    Best Call Back Number:[phone]      Additional Information:            "

## 2023-03-30 NOTE — TELEPHONE ENCOUNTER
Pt notified protocol for testing positive for Covid   Pt verbalized understanding    Advised pt to call us back is symptoms get worse or go to the nearest hospital

## 2023-03-31 ENCOUNTER — TELEPHONE (OUTPATIENT)
Dept: INTERNAL MEDICINE | Facility: CLINIC | Age: 73
End: 2023-03-31
Payer: MEDICARE

## 2023-04-03 ENCOUNTER — TELEPHONE (OUTPATIENT)
Dept: ADMINISTRATIVE | Facility: HOSPITAL | Age: 73
End: 2023-04-03
Payer: MEDICARE

## 2023-04-19 ENCOUNTER — TELEPHONE (OUTPATIENT)
Dept: CARDIOLOGY | Facility: HOSPITAL | Age: 73
End: 2023-04-19
Payer: MEDICARE

## 2023-04-19 NOTE — TELEPHONE ENCOUNTER
----- Message from Heraclio Paul sent at 4/19/2023 11:43 AM CDT -----  Contact: Ezio Olea is calling to reschedule echocardiogram, please call back at .[phone]

## 2023-04-21 ENCOUNTER — HOSPITAL ENCOUNTER (OUTPATIENT)
Dept: CARDIOLOGY | Facility: HOSPITAL | Age: 73
Discharge: HOME | End: 2023-04-21
Attending: INTERNAL MEDICINE
Payer: MEDICARE

## 2023-04-21 VITALS
SYSTOLIC BLOOD PRESSURE: 120 MMHG | BODY MASS INDEX: 28.49 KG/M2 | WEIGHT: 199 LBS | DIASTOLIC BLOOD PRESSURE: 84 MMHG | HEIGHT: 70 IN

## 2023-04-21 DIAGNOSIS — I34.0 NONRHEUMATIC MITRAL VALVE REGURGITATION: ICD-10-CM

## 2023-04-21 DIAGNOSIS — I42.8 NON-ISCHEMIC CARDIOMYOPATHY: ICD-10-CM

## 2023-04-21 DIAGNOSIS — I48.20 ATRIAL FIBRILLATION, CHRONIC: ICD-10-CM

## 2023-04-21 DIAGNOSIS — I50.42 CHRONIC COMBINED SYSTOLIC AND DIASTOLIC CONGESTIVE HEART FAILURE: ICD-10-CM

## 2023-04-21 DIAGNOSIS — I51.7 LAE (LEFT ATRIAL ENLARGEMENT): ICD-10-CM

## 2023-04-21 DIAGNOSIS — I10 ESSENTIAL HYPERTENSION: ICD-10-CM

## 2023-04-21 DIAGNOSIS — R94.31 ABNORMAL ECG: ICD-10-CM

## 2023-04-21 DIAGNOSIS — E66.3 OVERWEIGHT (BMI 25.0-29.9): ICD-10-CM

## 2023-04-21 DIAGNOSIS — I51.7 LVH (LEFT VENTRICULAR HYPERTROPHY): ICD-10-CM

## 2023-04-21 DIAGNOSIS — I42.8 OTHER CARDIOMYOPATHY: ICD-10-CM

## 2023-04-21 DIAGNOSIS — E78.6 LOW HDL (UNDER 40): ICD-10-CM

## 2023-04-21 LAB
AORTIC ROOT ANNULUS: 2.92 CM
ASCENDING AORTA: 4.04 CM
AV INDEX (PROSTH): 0.67
AV MEAN GRADIENT: 3 MMHG
AV PEAK GRADIENT: 4 MMHG
AV VALVE AREA: 2.96 CM2
AV VELOCITY RATIO: 0.7
BSA FOR ECHO PROCEDURE: 2.11 M2
CV ECHO LV RWT: 0.48 CM
DOP CALC AO PEAK VEL: 1.05 M/S
DOP CALC AO VTI: 21.9 CM
DOP CALC LVOT AREA: 4.4 CM2
DOP CALC LVOT DIAMETER: 2.38 CM
DOP CALC LVOT PEAK VEL: 0.73 M/S
DOP CALC LVOT STROKE VOLUME: 64.92 CM3
DOP CALC RVOT PEAK VEL: 0.62 M/S
DOP CALC RVOT VTI: 12.9 CM
DOP CALCLVOT PEAK VEL VTI: 14.6 CM
E WAVE DECELERATION TIME: 164.53 MSEC
E/E' RATIO: 12 M/S
ECHO LV POSTERIOR WALL: 1.26 CM (ref 0.6–1.1)
EJECTION FRACTION: 45 %
FRACTIONAL SHORTENING: 26 % (ref 28–44)
INTERVENTRICULAR SEPTUM: 1.07 CM (ref 0.6–1.1)
IVC DIAMETER: 1.97 CM
IVRT: 95.15 MSEC
LA MAJOR: 7 CM
LA MINOR: 7.36 CM
LA WIDTH: 4.12 CM
LEFT ATRIUM SIZE: 4.8 CM
LEFT ATRIUM VOLUME INDEX MOD: 50.4 ML/M2
LEFT ATRIUM VOLUME INDEX: 58 ML/M2
LEFT ATRIUM VOLUME MOD: 104.75 CM3
LEFT ATRIUM VOLUME: 120.62 CM3
LEFT INTERNAL DIMENSION IN SYSTOLE: 3.91 CM (ref 2.1–4)
LEFT VENTRICLE DIASTOLIC VOLUME INDEX: 64.44 ML/M2
LEFT VENTRICLE DIASTOLIC VOLUME: 134.04 ML
LEFT VENTRICLE MASS INDEX: 118 G/M2
LEFT VENTRICLE SYSTOLIC VOLUME INDEX: 32 ML/M2
LEFT VENTRICLE SYSTOLIC VOLUME: 66.49 ML
LEFT VENTRICULAR INTERNAL DIMENSION IN DIASTOLE: 5.28 CM (ref 3.5–6)
LEFT VENTRICULAR MASS: 244.8 G
LV LATERAL E/E' RATIO: 13 M/S
LV SEPTAL E/E' RATIO: 11.14 M/S
LVOT MG: 1.2 MMHG
LVOT MV: 0.51 CM/S
MV PEAK E VEL: 0.78 M/S
MV STENOSIS PRESSURE HALF TIME: 47.71 MS
MV VALVE AREA P 1/2 METHOD: 4.61 CM2
PISA TR MAX VEL: 2.68 M/S
PULM VEIN S/D RATIO: 0.58
PV MEAN GRADIENT: 0.74 MMHG
PV PEAK D VEL: 0.52 M/S
PV PEAK S VEL: 0.3 M/S
PV PEAK VELOCITY: 1.16 CM/S
RA MAJOR: 7.39 CM
RA PRESSURE: 15 MMHG
RA WIDTH: 6 CM
RIGHT VENTRICULAR END-DIASTOLIC DIMENSION: 3.93 CM
SINUS: 3.58 CM
STJ: 3.62 CM
TDI LATERAL: 0.06 M/S
TDI SEPTAL: 0.07 M/S
TDI: 0.07 M/S
TR MAX PG: 29 MMHG
TRICUSPID ANNULAR PLANE SYSTOLIC EXCURSION: 1.61 CM
TV REST PULMONARY ARTERY PRESSURE: 44 MMHG

## 2023-04-21 PROCEDURE — 93306 TTE W/DOPPLER COMPLETE: CPT

## 2023-04-21 PROCEDURE — 93306 TTE W/DOPPLER COMPLETE: CPT | Mod: 26,,, | Performed by: INTERNAL MEDICINE

## 2023-04-21 PROCEDURE — 93306 ECHO (CUPID ONLY): ICD-10-PCS | Mod: 26,,, | Performed by: INTERNAL MEDICINE

## 2023-04-25 ENCOUNTER — OFFICE VISIT (OUTPATIENT)
Dept: NEUROLOGY | Facility: CLINIC | Age: 73
End: 2023-04-25
Payer: MEDICARE

## 2023-04-25 VITALS
HEIGHT: 70 IN | DIASTOLIC BLOOD PRESSURE: 78 MMHG | HEART RATE: 76 BPM | WEIGHT: 200.19 LBS | SYSTOLIC BLOOD PRESSURE: 123 MMHG | BODY MASS INDEX: 28.66 KG/M2

## 2023-04-25 DIAGNOSIS — Z82.0 FAMILY HISTORY OF PARKINSON DISEASE: ICD-10-CM

## 2023-04-25 DIAGNOSIS — E53.8 B12 DEFICIENCY: ICD-10-CM

## 2023-04-25 DIAGNOSIS — I48.20 ATRIAL FIBRILLATION, CHRONIC: ICD-10-CM

## 2023-04-25 DIAGNOSIS — G25.0 BENIGN ESSENTIAL TREMOR: Primary | ICD-10-CM

## 2023-04-25 DIAGNOSIS — R25.1 TREMOR: ICD-10-CM

## 2023-04-25 PROCEDURE — 99999 PR PBB SHADOW E&M-EST. PATIENT-LVL IV: ICD-10-PCS | Mod: PBBFAC,,, | Performed by: NURSE PRACTITIONER

## 2023-04-25 PROCEDURE — 4010F ACE/ARB THERAPY RXD/TAKEN: CPT | Mod: CPTII,S$GLB,, | Performed by: NURSE PRACTITIONER

## 2023-04-25 PROCEDURE — 1126F PR PAIN SEVERITY QUANTIFIED, NO PAIN PRESENT: ICD-10-PCS | Mod: CPTII,S$GLB,, | Performed by: NURSE PRACTITIONER

## 2023-04-25 PROCEDURE — 4010F PR ACE/ARB THEARPY RXD/TAKEN: ICD-10-PCS | Mod: CPTII,S$GLB,, | Performed by: NURSE PRACTITIONER

## 2023-04-25 PROCEDURE — 99215 PR OFFICE/OUTPT VISIT, EST, LEVL V, 40-54 MIN: ICD-10-PCS | Mod: S$GLB,,, | Performed by: NURSE PRACTITIONER

## 2023-04-25 PROCEDURE — 1160F RVW MEDS BY RX/DR IN RCRD: CPT | Mod: CPTII,S$GLB,, | Performed by: NURSE PRACTITIONER

## 2023-04-25 PROCEDURE — 3008F BODY MASS INDEX DOCD: CPT | Mod: CPTII,S$GLB,, | Performed by: NURSE PRACTITIONER

## 2023-04-25 PROCEDURE — 3008F PR BODY MASS INDEX (BMI) DOCUMENTED: ICD-10-PCS | Mod: CPTII,S$GLB,, | Performed by: NURSE PRACTITIONER

## 2023-04-25 PROCEDURE — 1126F AMNT PAIN NOTED NONE PRSNT: CPT | Mod: CPTII,S$GLB,, | Performed by: NURSE PRACTITIONER

## 2023-04-25 PROCEDURE — 99215 OFFICE O/P EST HI 40 MIN: CPT | Mod: S$GLB,,, | Performed by: NURSE PRACTITIONER

## 2023-04-25 PROCEDURE — 1159F PR MEDICATION LIST DOCUMENTED IN MEDICAL RECORD: ICD-10-PCS | Mod: CPTII,S$GLB,, | Performed by: NURSE PRACTITIONER

## 2023-04-25 PROCEDURE — 3078F DIAST BP <80 MM HG: CPT | Mod: CPTII,S$GLB,, | Performed by: NURSE PRACTITIONER

## 2023-04-25 PROCEDURE — 1160F PR REVIEW ALL MEDS BY PRESCRIBER/CLIN PHARMACIST DOCUMENTED: ICD-10-PCS | Mod: CPTII,S$GLB,, | Performed by: NURSE PRACTITIONER

## 2023-04-25 PROCEDURE — 3288F PR FALLS RISK ASSESSMENT DOCUMENTED: ICD-10-PCS | Mod: CPTII,S$GLB,, | Performed by: NURSE PRACTITIONER

## 2023-04-25 PROCEDURE — 3074F SYST BP LT 130 MM HG: CPT | Mod: CPTII,S$GLB,, | Performed by: NURSE PRACTITIONER

## 2023-04-25 PROCEDURE — 99999 PR PBB SHADOW E&M-EST. PATIENT-LVL IV: CPT | Mod: PBBFAC,,, | Performed by: NURSE PRACTITIONER

## 2023-04-25 PROCEDURE — 3288F FALL RISK ASSESSMENT DOCD: CPT | Mod: CPTII,S$GLB,, | Performed by: NURSE PRACTITIONER

## 2023-04-25 PROCEDURE — 1101F PR PT FALLS ASSESS DOC 0-1 FALLS W/OUT INJ PAST YR: ICD-10-PCS | Mod: CPTII,S$GLB,, | Performed by: NURSE PRACTITIONER

## 2023-04-25 PROCEDURE — 3074F PR MOST RECENT SYSTOLIC BLOOD PRESSURE < 130 MM HG: ICD-10-PCS | Mod: CPTII,S$GLB,, | Performed by: NURSE PRACTITIONER

## 2023-04-25 PROCEDURE — 1101F PT FALLS ASSESS-DOCD LE1/YR: CPT | Mod: CPTII,S$GLB,, | Performed by: NURSE PRACTITIONER

## 2023-04-25 PROCEDURE — 3078F PR MOST RECENT DIASTOLIC BLOOD PRESSURE < 80 MM HG: ICD-10-PCS | Mod: CPTII,S$GLB,, | Performed by: NURSE PRACTITIONER

## 2023-04-25 PROCEDURE — 1159F MED LIST DOCD IN RCRD: CPT | Mod: CPTII,S$GLB,, | Performed by: NURSE PRACTITIONER

## 2023-04-25 RX ORDER — PRIMIDONE 50 MG/1
50 TABLET ORAL 2 TIMES DAILY
Qty: 60 TABLET | Refills: 11 | Status: SHIPPED | OUTPATIENT
Start: 2023-04-25 | End: 2023-12-29 | Stop reason: SDUPTHER

## 2023-04-25 NOTE — PROGRESS NOTES
Subjective:       Patient ID: Ezio James is a 72 y.o. male.    Chief Complaint: Benign essential tremor  and Establish Care        HPI     The patient is presenting with tremors that started 7 years ago (2015). The tremors started insidiously and progressed slowly over the last 7 years. The tremors are mainly in both arms (RT>LT). The tremors do emerge during action like writing or holding things. No rest tremors. No neck tremors. No leg tremors upon standing. No voice tremors. No muscle rigidity of muscle stiffness. No postural instability. No memory loss or dementia. Cannot tell if alcohol improves the tremors because the patient does not drink enough. Anxiety and emotional stress exacerbates the tremor. No significant diurnal variation in the tremors. No falls. No history of strokes. No head injury. No stimulants on board. No new meds started recently. Multiple family members have similar tremors, especially his father. One of his brothers was diagnosed with Parkinsons disease. He noted that coffee made the tremors much worse. Patient started primidone 50 mg QHS without adverse effects. He states his tremors have improved on the medication. He states he is happy with his current regiment. 02- CTH No significant abnormalities. Atrophy and chronic white matter changes.    INTERVAL HISTORY 04-: Patient is new to me but known to Christelle Wilhelm NP, Patient presents to follow up for ET management. Patient is unaccompanied. Patient continues to take Primidone 100 mg HS but reports he receives greater benefit taking medication 50 mg AM/ 50 mg  PM with greater  tremor reduction. No adverse effects. Discuss updating plan of care. Patient reports his health concerns regarding PD, Patient brother recently passed away with PD and patient would like to r/o PD.     Review of Systems   Constitutional:  Negative for appetite change and fatigue.   HENT:  Positive for hearing loss. Negative for tinnitus.     Eyes:  Negative for photophobia and visual disturbance.   Respiratory:  Negative for apnea and shortness of breath.    Cardiovascular:  Negative for chest pain and palpitations.   Gastrointestinal:  Negative for nausea and vomiting.   Endocrine: Negative for cold intolerance and heat intolerance.   Genitourinary:  Negative for difficulty urinating and urgency.   Musculoskeletal:  Positive for gait problem. Negative for arthralgias, back pain, joint swelling, myalgias, neck pain and neck stiffness.   Skin:  Negative for color change and rash.   Allergic/Immunologic: Negative for environmental allergies and immunocompromised state.   Neurological:  Positive for tremors. Negative for dizziness, seizures, syncope, facial asymmetry, speech difficulty, weakness, light-headedness, numbness and headaches.   Hematological:  Negative for adenopathy. Does not bruise/bleed easily.   Psychiatric/Behavioral:  Negative for agitation, behavioral problems, confusion, decreased concentration, dysphoric mood, hallucinations, self-injury, sleep disturbance and suicidal ideas. The patient is not hyperactive.              Current Outpatient Medications:     aspirin (ECOTRIN) 81 MG EC tablet, Take 1 tablet (81 mg total) by mouth once daily., Disp: 90 tablet, Rfl: 2    cholecalciferol, vitamin D3, 125 mcg (5,000 unit) Tab, Take 5,000 Units by mouth once daily., Disp: , Rfl:     cyanocobalamin (VITAMIN B-12) 1000 MCG tablet, Take 100 mcg by mouth once daily., Disp: , Rfl:     levothyroxine (SYNTHROID) 88 MCG tablet, TAKE 1 TABLET BEFORE BREAKFAST, Disp: 90 tablet, Rfl: 3    lisinopriL 10 MG tablet, Take 1 tablet (10 mg total) by mouth once daily., Disp: 90 tablet, Rfl: 3    metoprolol succinate (TOPROL-XL) 50 MG 24 hr tablet, TAKE 1 TABLET DAILY, Disp: 90 tablet, Rfl: 3    multivitamin capsule, Take 1 capsule by mouth once daily., Disp: , Rfl:     rosuvastatin (CRESTOR) 10 MG tablet, Take 1 tablet (10 mg total) by mouth every evening.,  Disp: 90 tablet, Rfl: 3    tamsulosin (FLOMAX) 0.4 mg Cap, Take 1 capsule (0.4 mg total) by mouth once daily., Disp: 30 capsule, Rfl: 0    primidone (MYSOLINE) 50 MG Tab, Take 1 tablet (50 mg total) by mouth 2 (two) times a day., Disp: 60 tablet, Rfl: 11         Past Medical History:   Diagnosis Date    Atrial fibrillation, chronic     Chronic systolic congestive heart failure 5/4/2018    Colon polyp 04,15    Essential hypertension 6/14/2017    Subclinical hypothyroidism      Past Surgical History:   Procedure Laterality Date    COLONOSCOPY  2004,5/15    with polyps per Chart    LEFT HEART CATHETERIZATION Left 6/21/2018    Procedure: HEART CATH-LEFT;  Surgeon: Leon James MD;  Location: [facility] CATH LAB;  Service: Cardiology;  Laterality: Left;  7:30 start time  right radial approach     Social History     Socioeconomic History    Marital status:    Tobacco Use    Smoking status: Never    Smokeless tobacco: Never   Substance and Sexual Activity    Alcohol use: Yes     Comment: Rarely    Drug use: No    Sexual activity: Yes     Partners: Female     Birth control/protection: None             Past/Current Medical/Surgical History, Past/Current Social History, Past/Current Family History and Past/Current Medications were reviewed in detail.        Objective:     VITAL SIGNS WERE REVIEWED      GENERAL APPEARANCE:     The patient looks comfortable.    BMI 28.53    No signs of respiratory distress.    Normal breathing pattern.    No dysmorphic features    Normal eye contact.     GENERAL MEDICAL EXAM:    HEENT:  Head is atraumatic normocephalic. Fundoscopic (Ophthalmoscopic) exam showed no disc edema.      Neck and Axillae: No JVD. No visible lesions.    Cardiopulmonary: No cyanosis. No tachypnea. Normal respiratory effort.    Gastrointestinal/Urogenital:  No jaundice. No stomas or lesions. No visible hernias. No catheters.     Skin, Hair and Nails: No pathognonomic skin rash. No neurofibromatosis. No visible  lesions.No stigmata of autoimmune disease. No clubbing.    Limbs: No varicose veins. No visible swelling.    Muskoskeletal: No visible deformities.No visible lesions. Ortho boot on right foot-fracture           Neurologic Exam     Mental Status   Oriented to person, place, and time.   Follows 3 step commands.   Attention: normal. Concentration: normal.   Speech: speech is normal   Level of consciousness: alert  Knowledge: good.   Able to name object. Able to repeat. Normal comprehension.     Cranial Nerves   Cranial nerves II through XII intact.     CN II   Visual fields full to confrontation.   Visual acuity: normal  Right visual field deficit: none  Left visual field deficit: none     CN III, IV, VI   Pupils are equal, round, and reactive to light.  Extraocular motions are normal.   Right pupil: Size: 2 mm. Shape: regular. Reactivity: brisk. Consensual response: intact. Accommodation: intact.   Left pupil: Size: 2 mm. Shape: regular. Reactivity: brisk. Consensual response: intact. Accommodation: intact.   CN III: no CN III palsy  CN VI: no CN VI palsy  Nystagmus: none   Diplopia: none  Ophthalmoparesis: none  Upgaze: normal  Downgaze: normal  Conjugate gaze: present  Vestibulo-ocular reflex: present    CN V   Facial sensation intact.   Right facial sensation deficit: none  Left facial sensation deficit: none    CN VII   Facial expression full, symmetric.   Right facial weakness: none  Left facial weakness: none    CN VIII   CN VIII normal.   Hearing: intact    CN IX, X   CN IX normal.   CN X normal.   Palate: symmetric    CN XI   CN XI normal.   Right sternocleidomastoid strength: normal  Left sternocleidomastoid strength: normal  Right trapezius strength: normal  Left trapezius strength: normal    CN XII   CN XII normal.   Tongue: not atrophic  Fasciculations: absent  Tongue deviation: none    Motor Exam   Muscle bulk: normal  Overall muscle tone: normal  Right arm tone: normal  Left arm tone: normal  Right arm  pronator drift: absent  Left arm pronator drift: absent  Right leg tone: normal  Left leg tone: normal    Strength   Strength 5/5 throughout.   Right strength:   Ortho boot on right foot-fracture  Right neck flexion: 5/5  Left neck flexion: 5/5  Right neck extension: 5/5  Left neck extension: 5/5  Right deltoid: 5/5  Left deltoid: 5/5  Right biceps: 5/5  Left biceps: 5/5  Right triceps: 5/5  Left triceps: 5/5  Right wrist flexion: 5/5  Left wrist flexion: 5/5  Right wrist extension: 5/5  Left wrist extension: 5/5  Right interossei: 5/5  Left interossei: 5/5  Right iliopsoas: 5/5  Left iliopsoas: 5/5  Right quadriceps: 5/5  Left quadriceps: 5/5  Right hamstrin/5  Left hamstrin/5  Right glutei: 5/5  Left glutei: 5/5  Left anterior tibial: 5/5  Left posterior tibial: 5/5  Left peroneal: 5/5  Left gastroc: 5/5    Sensory Exam   Light touch normal.   Right arm light touch: normal  Left arm light touch: normal  Right leg light touch: normal  Left leg light touch: normal  Right arm vibration: normal  Left arm vibration: normal  Right leg vibration: decreased from toes  Left leg vibration: decreased from toes  Right arm proprioception: normal  Left arm proprioception: normal  Right leg proprioception: decreased from toes  Left leg proprioception: decreased from toes  Pinprick normal.   Right arm pinprick: normal  Left arm pinprick: normal  Right leg pinprick: normal  Left leg pinprick: normal  Graphesthesia: normal  Stereognosis: normal    Gait, Coordination, and Reflexes     Gait  Gait: normal    Coordination   Romberg: negative  Finger to nose coordination: normal  Heel to shin coordination: normal  Tandem walking coordination: normal    Tremor   Resting tremor: absent  Intention tremor: absent  Action tremor: left arm and right arm    Reflexes   Right brachioradialis: 2+  Left brachioradialis: 2+  Right biceps: 2+  Left biceps: 2+  Right triceps: 2+  Left triceps: 2+  Right patellar: 1+  Left patellar: 1+  Right  achilles: 0  Left achilles: 0  Right plantar: normal  Left plantar: normal  Right Henderson: absent  Left Henderson: absent  Right ankle clonus: absent  Left ankle clonus: absent  Right pendular knee jerk: absent  Left pendular knee jerk: absent  BUE AP 8 Hz Tremors     RT>LT    IMPROVED      Lab Results   Component Value Date    WBC 5.29 02/01/2023    HGB 14.2 02/01/2023    HCT 43.8 02/01/2023    MCV 97 02/01/2023     02/01/2023     Sodium   Date Value Ref Range Status   03/29/2023 138 136 - 145 mmol/L Final     Potassium   Date Value Ref Range Status   03/29/2023 5.3 (H) 3.5 - 5.1 mmol/L Final     Comment:     *No Visible Hemolysis     Chloride   Date Value Ref Range Status   03/29/2023 103 95 - 110 mmol/L Final     CO2   Date Value Ref Range Status   03/29/2023 30 (H) 23 - 29 mmol/L Final     Glucose   Date Value Ref Range Status   03/29/2023 91 70 - 110 mg/dL Final     BUN   Date Value Ref Range Status   03/29/2023 17 8 - 23 mg/dL Final     Creatinine   Date Value Ref Range Status   03/29/2023 0.7 0.5 - 1.4 mg/dL Final     Calcium   Date Value Ref Range Status   03/29/2023 9.5 8.7 - 10.5 mg/dL Final     Total Protein   Date Value Ref Range Status   03/29/2023 7.0 6.0 - 8.4 g/dL Final     Albumin   Date Value Ref Range Status   03/29/2023 4.1 3.5 - 5.2 g/dL Final     Total Bilirubin   Date Value Ref Range Status   03/29/2023 0.7 0.1 - 1.0 mg/dL Final     Comment:     For infants and newborns, interpretation of results should be based  on gestational age, weight and in agreement with clinical  observations.    Premature Infant recommended reference ranges:  Up to 24 hours.............<8.0 mg/dL  Up to 48 hours............<12.0 mg/dL  3-5 days..................<15.0 mg/dL  6-29 days.................<15.0 mg/dL       Alkaline Phosphatase   Date Value Ref Range Status   03/29/2023 57 55 - 135 U/L Final     AST   Date Value Ref Range Status   03/29/2023 20 10 - 40 U/L Final     ALT   Date Value Ref Range Status    03/29/2023 13 10 - 44 U/L Final     Anion Gap   Date Value Ref Range Status   03/29/2023 5 (L) 8 - 16 mmol/L Final     eGFR if    Date Value Ref Range Status   02/01/2022 >60.0 >60 mL/min/1.73 m^2 Final     eGFR if non    Date Value Ref Range Status   02/01/2022 >60.0 >60 mL/min/1.73 m^2 Final     Comment:     Calculation used to obtain the estimated glomerular filtration  rate (eGFR) is the CKD-EPI equation.        Lab Results   Component Value Date    ADECCNHG49 737 02/01/2023     Lab Results   Component Value Date    TSH 3.390 02/01/2023    FREET4 0.96 02/01/2023 02-    Free T4 NL      02-    CTH No significant abnormalities     Atrophy and chronic white matter changes      Reviewed the neuroimaging independently       Assessment:       1. Benign essential tremor    2. Tremor    3. Family history of Parkinson disease    4. Atrial fibrillation, chronic    5. B12 deficiency            Plan:       ESSENTIAL TREMOR (ET), BENIGN, BUE, RT >LT, ONSET 2015       Will order MIGUEL scan     Avoid stimulants like caffeine, nicotineetc.    Avoid hot drinks, drink from half empty glasses and wear heavy bracelet/watch.     Change Primidone 100 mg QHS to  50 mg po BID .     I also counseled the patient about the fact the medication decreases the amplitude and not the rate of the tremor and less effective for titubition.  I also counseled the patient that the disease is slowly progressive.       NEXT OPTIONS:    Topiramate (Topamax)TPM titration to  mg BID which can cause mental slowing, transient tingling, kidney stones, weight loss, cleft lip and palate and rarely glaucoma and visual field defects . The patient was encouraged to drink a lot of fluids.     Methazolamide (Neptazane) 50 mg TID (100 mg BID) which can cause sedation, numbness, gastrointestinal upset and rarely kidney stones. Safety during pregnancy is unknown.    He is already on Metoprolol.          INTERVENTIONAL OPTIONS:      Neuravive, Gamma Knife and DBS.         MEDICAL/SURGICAL COMORBIDITIES     All relevant medical comorbidities noted and managed by primary care physician and medical care team.          MISCELLANEOUS MEDICAL PROBLEMS       HEALTHY LIFESTYLE AND PREVENTATIVE CARE    The patient to adhere to the age-appropriate health maintenance guidelines including screening tests and vaccinations. The patient to adhere to  healthy lifestyle, optimal weight, exercise, healthy diet, good sleep hygiene and avoiding drugs including smoking, alcohol and recreational drugs.      I spent a total of 40 minutes on the day of the visit.    This includes face to face time and non-face to face time preparing to see the patient (eg, review of tests), obtaining and/or reviewing separately obtained history, documenting clinical information in the electronic or other health record, independently interpreting results and communicating results to the patient/family/caregiver, or care coordinator.        RTC annually          Alcon Maddox, MSN NP      Collaborating Provider: Geri Meeks MD, FAAN Neurologist/Epileptologist

## 2023-05-07 DIAGNOSIS — N40.1 BENIGN PROSTATIC HYPERPLASIA WITH URINARY FREQUENCY: ICD-10-CM

## 2023-05-07 DIAGNOSIS — I42.9 CARDIOMYOPATHY, UNSPECIFIED TYPE: ICD-10-CM

## 2023-05-07 DIAGNOSIS — R35.0 BENIGN PROSTATIC HYPERPLASIA WITH URINARY FREQUENCY: ICD-10-CM

## 2023-05-07 RX ORDER — TAMSULOSIN HYDROCHLORIDE 0.4 MG/1
0.4 CAPSULE ORAL DAILY
Qty: 30 CAPSULE | Refills: 0 | Status: SHIPPED | OUTPATIENT
Start: 2023-05-07 | End: 2023-06-05 | Stop reason: SDUPTHER

## 2023-05-08 RX ORDER — METOPROLOL SUCCINATE 50 MG/1
TABLET, EXTENDED RELEASE ORAL
Qty: 90 TABLET | Refills: 3 | Status: SHIPPED | OUTPATIENT
Start: 2023-05-08

## 2023-06-02 ENCOUNTER — PES CALL (OUTPATIENT)
Dept: ADMINISTRATIVE | Facility: CLINIC | Age: 73
End: 2023-06-02
Payer: MEDICARE

## 2023-06-04 ENCOUNTER — NURSE TRIAGE (OUTPATIENT)
Dept: ADMINISTRATIVE | Facility: CLINIC | Age: 73
End: 2023-06-04
Payer: MEDICARE

## 2023-06-04 NOTE — TELEPHONE ENCOUNTER
[place]    PCP:  Dr. Darren Nova    He reports needs a refill for Flomax and would like a 90-day supply if possible sent to [facility] for  (Pharmacy on file in EMR).  He has a 8 doses left at this time.  Per protocol, care advised is  when office open.  Pt VU.  Advised to call for worsening/questions/concerns.  VU.     Reason for Disposition   [1] Prescription refill request for NON-ESSENTIAL medicine (i.e., no harm to patient if med not taken) AND [2] triager unable to refill per department policy    Additional Information   Negative: [1] Prescription refill request for ESSENTIAL medicine (i.e., likelihood of harm to patient if not taken) AND [2] triager unable to refill per department policy   Negative: [1] Prescription not at pharmacy AND [2] was prescribed by PCP recently  (Exception: Triager has access to EMR and prescription is recorded there. Go to Home Care and confirm for pharmacy.)   Negative: [1] Pharmacy calling with prescription questions AND [2] triager unable to answer question   Negative: Prescription request for new medicine (not a refill)   Negative: Caller requesting a CONTROLLED substance prescription refill (e.g., narcotics, ADHD medicines)    Protocols used: Medication Refill and Renewal Call-A-

## 2023-06-05 ENCOUNTER — TELEPHONE (OUTPATIENT)
Dept: UROLOGY | Facility: CLINIC | Age: 73
End: 2023-06-05
Payer: MEDICARE

## 2023-06-05 DIAGNOSIS — R35.0 BENIGN PROSTATIC HYPERPLASIA WITH URINARY FREQUENCY: ICD-10-CM

## 2023-06-05 DIAGNOSIS — N40.1 BENIGN PROSTATIC HYPERPLASIA WITH URINARY FREQUENCY: ICD-10-CM

## 2023-06-05 RX ORDER — TAMSULOSIN HYDROCHLORIDE 0.4 MG/1
0.4 CAPSULE ORAL DAILY
Qty: 90 CAPSULE | Refills: 3 | Status: SHIPPED | OUTPATIENT
Start: 2023-06-05 | End: 2024-03-13 | Stop reason: SDUPTHER

## 2023-06-05 NOTE — TELEPHONE ENCOUNTER
Attempt to reach out to pt but was unsuccessful. LVM informing him that he would have to see Ame Campbell for an office visit for a med refill before she can refill his Flomax.

## 2023-06-06 ENCOUNTER — LAB VISIT (OUTPATIENT)
Dept: LAB | Facility: HOSPITAL | Age: 73
End: 2023-06-06
Attending: FAMILY MEDICINE
Payer: MEDICARE

## 2023-06-06 ENCOUNTER — OFFICE VISIT (OUTPATIENT)
Dept: INTERNAL MEDICINE | Facility: CLINIC | Age: 73
End: 2023-06-06
Payer: MEDICARE

## 2023-06-06 VITALS
SYSTOLIC BLOOD PRESSURE: 130 MMHG | OXYGEN SATURATION: 97 % | WEIGHT: 203.06 LBS | TEMPERATURE: 98 F | DIASTOLIC BLOOD PRESSURE: 72 MMHG | HEIGHT: 70 IN | HEART RATE: 71 BPM | BODY MASS INDEX: 29.07 KG/M2

## 2023-06-06 DIAGNOSIS — I48.20 ATRIAL FIBRILLATION, CHRONIC: ICD-10-CM

## 2023-06-06 DIAGNOSIS — I10 ESSENTIAL HYPERTENSION: ICD-10-CM

## 2023-06-06 DIAGNOSIS — M79.10 MYALGIA: ICD-10-CM

## 2023-06-06 DIAGNOSIS — I42.8 OTHER CARDIOMYOPATHY: ICD-10-CM

## 2023-06-06 DIAGNOSIS — Z02.89 ENCOUNTER FOR COMPLETION OF FORM WITH PATIENT: Primary | ICD-10-CM

## 2023-06-06 DIAGNOSIS — E03.9 HYPOTHYROIDISM, UNSPECIFIED TYPE: ICD-10-CM

## 2023-06-06 DIAGNOSIS — E87.5 HYPERKALEMIA: ICD-10-CM

## 2023-06-06 LAB — POTASSIUM SERPL-SCNC: 4.8 MMOL/L (ref 3.5–5.1)

## 2023-06-06 PROCEDURE — 99999 PR PBB SHADOW E&M-EST. PATIENT-LVL IV: ICD-10-PCS | Mod: PBBFAC,,, | Performed by: FAMILY MEDICINE

## 2023-06-06 PROCEDURE — 36415 COLL VENOUS BLD VENIPUNCTURE: CPT | Performed by: FAMILY MEDICINE

## 2023-06-06 PROCEDURE — 3078F PR MOST RECENT DIASTOLIC BLOOD PRESSURE < 80 MM HG: ICD-10-PCS | Mod: CPTII,S$GLB,, | Performed by: FAMILY MEDICINE

## 2023-06-06 PROCEDURE — 84132 ASSAY OF SERUM POTASSIUM: CPT | Performed by: FAMILY MEDICINE

## 2023-06-06 PROCEDURE — 3075F PR MOST RECENT SYSTOLIC BLOOD PRESS GE 130-139MM HG: ICD-10-PCS | Mod: CPTII,S$GLB,, | Performed by: FAMILY MEDICINE

## 2023-06-06 PROCEDURE — 3008F PR BODY MASS INDEX (BMI) DOCUMENTED: ICD-10-PCS | Mod: CPTII,S$GLB,, | Performed by: FAMILY MEDICINE

## 2023-06-06 PROCEDURE — 3008F BODY MASS INDEX DOCD: CPT | Mod: CPTII,S$GLB,, | Performed by: FAMILY MEDICINE

## 2023-06-06 PROCEDURE — 99999 PR PBB SHADOW E&M-EST. PATIENT-LVL IV: CPT | Mod: PBBFAC,,, | Performed by: FAMILY MEDICINE

## 2023-06-06 PROCEDURE — 1125F AMNT PAIN NOTED PAIN PRSNT: CPT | Mod: CPTII,S$GLB,, | Performed by: FAMILY MEDICINE

## 2023-06-06 PROCEDURE — 1159F PR MEDICATION LIST DOCUMENTED IN MEDICAL RECORD: ICD-10-PCS | Mod: CPTII,S$GLB,, | Performed by: FAMILY MEDICINE

## 2023-06-06 PROCEDURE — 4010F PR ACE/ARB THEARPY RXD/TAKEN: ICD-10-PCS | Mod: CPTII,S$GLB,, | Performed by: FAMILY MEDICINE

## 2023-06-06 PROCEDURE — 1159F MED LIST DOCD IN RCRD: CPT | Mod: CPTII,S$GLB,, | Performed by: FAMILY MEDICINE

## 2023-06-06 PROCEDURE — 1125F PR PAIN SEVERITY QUANTIFIED, PAIN PRESENT: ICD-10-PCS | Mod: CPTII,S$GLB,, | Performed by: FAMILY MEDICINE

## 2023-06-06 PROCEDURE — 99214 PR OFFICE/OUTPT VISIT, EST, LEVL IV, 30-39 MIN: ICD-10-PCS | Mod: S$GLB,,, | Performed by: FAMILY MEDICINE

## 2023-06-06 PROCEDURE — 3288F PR FALLS RISK ASSESSMENT DOCUMENTED: ICD-10-PCS | Mod: CPTII,S$GLB,, | Performed by: FAMILY MEDICINE

## 2023-06-06 PROCEDURE — 3288F FALL RISK ASSESSMENT DOCD: CPT | Mod: CPTII,S$GLB,, | Performed by: FAMILY MEDICINE

## 2023-06-06 PROCEDURE — 99214 OFFICE O/P EST MOD 30 MIN: CPT | Mod: S$GLB,,, | Performed by: FAMILY MEDICINE

## 2023-06-06 PROCEDURE — 1101F PR PT FALLS ASSESS DOC 0-1 FALLS W/OUT INJ PAST YR: ICD-10-PCS | Mod: CPTII,S$GLB,, | Performed by: FAMILY MEDICINE

## 2023-06-06 PROCEDURE — 3075F SYST BP GE 130 - 139MM HG: CPT | Mod: CPTII,S$GLB,, | Performed by: FAMILY MEDICINE

## 2023-06-06 PROCEDURE — 3078F DIAST BP <80 MM HG: CPT | Mod: CPTII,S$GLB,, | Performed by: FAMILY MEDICINE

## 2023-06-06 PROCEDURE — 4010F ACE/ARB THERAPY RXD/TAKEN: CPT | Mod: CPTII,S$GLB,, | Performed by: FAMILY MEDICINE

## 2023-06-06 PROCEDURE — 1101F PT FALLS ASSESS-DOCD LE1/YR: CPT | Mod: CPTII,S$GLB,, | Performed by: FAMILY MEDICINE

## 2023-06-06 NOTE — PROGRESS NOTES
Subjective:       Patient ID: Ezio James is a 72 y.o. male.    Chief Complaint: Annual Exam    Completion of wellness form for insurance.  Medical history includes chronic atrial fibrillation hypothyroidism tremor hypertension cardio myopathy BPH.  He denies chest pain palpitations shortness breath edema.  He had a recent episode left neck right shoulder right lower back discomfort lasting several days.  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  His symptoms have mostly improved or resolved.    Review of Systems   Constitutional:  Negative for activity change, appetite change, fatigue and unexpected weight change.   Respiratory:  Negative for cough, chest tightness, shortness of breath and wheezing.    Cardiovascular:  Negative for chest pain, palpitations and leg swelling.   Gastrointestinal:  Negative for abdominal distention and abdominal pain.   Musculoskeletal:  Positive for arthralgias, back pain and neck pain.   Neurological:  Negative for dizziness, weakness, light-headedness and headaches.     Objective:      Physical Exam  Constitutional:       General: He is not in acute distress.     Appearance: He is not ill-appearing or diaphoretic.   Cardiovascular:      Rate and Rhythm: Normal rate. Rhythm irregular.      Heart sounds: No murmur heard.    No gallop.   Pulmonary:      Effort: Pulmonary effort is normal. No respiratory distress.      Breath sounds: No wheezing, rhonchi or rales.   Abdominal:      General: There is no distension.      Palpations: Abdomen is soft. There is no mass.      Tenderness: There is no abdominal tenderness.   Musculoskeletal:      Comments: No cervical lumbar tenderness shoulder range of motion normal with no pain   Lymphadenopathy:      Cervical: No cervical adenopathy.   Skin:     General: Skin is warm and dry.      Coloration: Skin is not pale.      Findings: No erythema.   Neurological:      Mental Status: He is alert.       Hospital Outpatient Visit on  04/21/2023   Component Date Value Ref Range Status    BSA 04/21/2023 2.11  m2 Final    TDI SEPTAL 04/21/2023 0.07  m/s Final    LV LATERAL E/E' RATIO 04/21/2023 13.00  m/s Final    LV SEPTAL E/E' RATIO 04/21/2023 11.14  m/s Final    LA WIDTH 04/21/2023 4.12  cm Final    IVC diameter 04/21/2023 1.97  cm Final    Left Ventricular Outflow Tract Priscilla* 04/21/2023 0.51  cm/s Final    Left Ventricular Outflow Tract Priscilla* 04/21/2023 1.20  mmHg Final    TDI LATERAL 04/21/2023 0.06  m/s Final    PV PEAK VELOCITY 04/21/2023 1.16  cm/s Final    LVIDd 04/21/2023 5.28  3.5 - 6.0 cm Final    IVS 04/21/2023 1.07  0.6 - 1.1 cm Final    Posterior Wall 04/21/2023 1.26 (A)  0.6 - 1.1 cm Final    Ao root annulus 04/21/2023 2.92  cm Final    LVIDs 04/21/2023 3.91  2.1 - 4.0 cm Final    FS 04/21/2023 26  28 - 44 % Final    LA volume 04/21/2023 120.62  cm3 Final    Sinus 04/21/2023 3.58  cm Final    STJ 04/21/2023 3.62  cm Final    Ascending aorta 04/21/2023 4.04  cm Final    LV mass 04/21/2023 244.80  g Final    LA size 04/21/2023 4.80  cm Final    RVDD 04/21/2023 3.93  cm Final    TAPSE 04/21/2023 1.61  cm Final    Left Ventricle Relative Wall Thick* 04/21/2023 0.48  cm Final    AV mean gradient 04/21/2023 3  mmHg Final    AV valve area 04/21/2023 2.96  cm2 Final    AV Velocity Ratio 04/21/2023 0.70   Final    AV index (prosthetic) 04/21/2023 0.67   Final    MV valve area p 1/2 method 04/21/2023 4.61  cm2 Final    Mean e' 04/21/2023 0.07  m/s Final    E wave deceleration time 04/21/2023 164.53  msec Final    IVRT 04/21/2023 95.15  msec Final    Pulm vein S/D ratio 04/21/2023 0.58   Final    LVOT diameter 04/21/2023 2.38  cm Final    LVOT area 04/21/2023 4.4  cm2 Final    LVOT peak joslyn 04/21/2023 0.73  m/s Final    LVOT peak VTI 04/21/2023 14.60  cm Final    Ao peak joslyn 04/21/2023 1.05  m/s Final    Ao VTI 04/21/2023 21.9  cm Final    RVOT peak joslyn 04/21/2023 0.62  m/s Final    RVOT peak VTI 04/21/2023 12.9  cm Final    LVOT stroke volume  04/21/2023 64.92  cm3 Final    AV peak gradient 04/21/2023 4  mmHg Final    PV mean gradient 04/21/2023 0.74  mmHg Final    E/E' ratio 04/21/2023 12.00  m/s Final    MV Peak E Abbe 04/21/2023 0.78  m/s Final    TR Max Abbe 04/21/2023 2.68  m/s Final    MV stenosis pressure 1/2 time 04/21/2023 47.71  ms Final    PV Peak S Abbe 04/21/2023 0.30  m/s Final    PV Peak D Abbe 04/21/2023 0.52  m/s Final    LV Systolic Volume 04/21/2023 66.49  mL Final    LV Systolic Volume Index 04/21/2023 32.0  mL/m2 Final    LV Diastolic Volume 04/21/2023 134.04  mL Final    LV Diastolic Volume Index 04/21/2023 64.44  mL/m2 Final    LA Volume Index 04/21/2023 58.0  mL/m2 Final    LV Mass Index 04/21/2023 118  g/m2 Final    RA Major Axis 04/21/2023 7.39  cm Final    Left Atrium Minor Axis 04/21/2023 7.36  cm Final    Left Atrium Major Axis 04/21/2023 7.00  cm Final    Triscuspid Valve Regurgitation Pea* 04/21/2023 29  mmHg Final    LA Volume Index (Mod) 04/21/2023 50.4  mL/m2 Final    LA volume (mod) 04/21/2023 104.75  cm3 Final    RA Width 04/21/2023 6.00  cm Final    Right Atrial Pressure (from IVC) 04/21/2023 15  mmHg Final    EF 04/21/2023 45  % Final    TV rest pulmonary artery pressure 04/21/2023 44  mmHg Final     Assessment:       1. Encounter for completion of form with patient    2. Hyperkalemia    3. Essential hypertension    4. Atrial fibrillation, chronic    5. Hypothyroidism, unspecified type    6. Other cardiomyopathy    7. Myalgia        Plan:   Review of lab with mildly elevated potassium recently done.  Repeat potassium level.  Insurance form was completed.  Follow-up in 6 months      Encounter for completion of form with patient    Hyperkalemia  -     Potassium; Future; Expected date: 06/06/2023    Essential hypertension    Atrial fibrillation, chronic    Hypothyroidism, unspecified type    Other cardiomyopathy    Myalgia

## 2023-08-31 ENCOUNTER — TELEPHONE (OUTPATIENT)
Dept: INTERNAL MEDICINE | Facility: CLINIC | Age: 73
End: 2023-08-31
Payer: MEDICARE

## 2023-08-31 NOTE — TELEPHONE ENCOUNTER
----- Message from Zenobia Mane sent at 8/31/2023  8:18 AM CDT -----  Pt would like an order put in for blood work before the upcoming appt on 09/07/2023. Call back number is [phone]. Thx.EL

## 2023-09-07 ENCOUNTER — LAB VISIT (OUTPATIENT)
Dept: LAB | Facility: HOSPITAL | Age: 73
End: 2023-09-07
Attending: FAMILY MEDICINE
Payer: MEDICARE

## 2023-09-07 ENCOUNTER — OFFICE VISIT (OUTPATIENT)
Dept: INTERNAL MEDICINE | Facility: CLINIC | Age: 73
End: 2023-09-07
Payer: MEDICARE

## 2023-09-07 VITALS
BODY MASS INDEX: 28.15 KG/M2 | SYSTOLIC BLOOD PRESSURE: 122 MMHG | HEIGHT: 70 IN | WEIGHT: 196.63 LBS | TEMPERATURE: 96 F | HEART RATE: 83 BPM | DIASTOLIC BLOOD PRESSURE: 86 MMHG | OXYGEN SATURATION: 95 %

## 2023-09-07 DIAGNOSIS — I10 ESSENTIAL HYPERTENSION: ICD-10-CM

## 2023-09-07 DIAGNOSIS — E03.9 HYPOTHYROIDISM, UNSPECIFIED TYPE: ICD-10-CM

## 2023-09-07 DIAGNOSIS — I48.20 ATRIAL FIBRILLATION, CHRONIC: ICD-10-CM

## 2023-09-07 DIAGNOSIS — I42.8 NON-ISCHEMIC CARDIOMYOPATHY: ICD-10-CM

## 2023-09-07 DIAGNOSIS — G25.0 BENIGN ESSENTIAL TREMOR: ICD-10-CM

## 2023-09-07 DIAGNOSIS — I42.8 OTHER CARDIOMYOPATHY: ICD-10-CM

## 2023-09-07 DIAGNOSIS — E53.8 B12 DEFICIENCY: ICD-10-CM

## 2023-09-07 DIAGNOSIS — R53.83 FATIGUE, UNSPECIFIED TYPE: Primary | ICD-10-CM

## 2023-09-07 LAB
ALBUMIN SERPL BCP-MCNC: 3.8 G/DL (ref 3.5–5.2)
ALP SERPL-CCNC: 50 U/L (ref 55–135)
ALT SERPL W/O P-5'-P-CCNC: 11 U/L (ref 10–44)
ANION GAP SERPL CALC-SCNC: 13 MMOL/L (ref 8–16)
AST SERPL-CCNC: 20 U/L (ref 10–40)
BASOPHILS # BLD AUTO: 0.03 K/UL (ref 0–0.2)
BASOPHILS NFR BLD: 0.5 % (ref 0–1.9)
BILIRUB SERPL-MCNC: 0.7 MG/DL (ref 0.1–1)
BUN SERPL-MCNC: 14 MG/DL (ref 8–23)
CALCIUM SERPL-MCNC: 9 MG/DL (ref 8.7–10.5)
CHLORIDE SERPL-SCNC: 105 MMOL/L (ref 95–110)
CO2 SERPL-SCNC: 24 MMOL/L (ref 23–29)
CREAT SERPL-MCNC: 0.9 MG/DL (ref 0.5–1.4)
DIFFERENTIAL METHOD: ABNORMAL
EOSINOPHIL # BLD AUTO: 0.1 K/UL (ref 0–0.5)
EOSINOPHIL NFR BLD: 2.2 % (ref 0–8)
ERYTHROCYTE [DISTWIDTH] IN BLOOD BY AUTOMATED COUNT: 12.1 % (ref 11.5–14.5)
EST. GFR  (NO RACE VARIABLE): >60 ML/MIN/1.73 M^2
GLUCOSE SERPL-MCNC: 88 MG/DL (ref 70–110)
HCT VFR BLD AUTO: 44 % (ref 40–54)
HGB BLD-MCNC: 14.5 G/DL (ref 14–18)
IMM GRANULOCYTES # BLD AUTO: 0.02 K/UL (ref 0–0.04)
IMM GRANULOCYTES NFR BLD AUTO: 0.3 % (ref 0–0.5)
LYMPHOCYTES # BLD AUTO: 2 K/UL (ref 1–4.8)
LYMPHOCYTES NFR BLD: 33.3 % (ref 18–48)
MCH RBC QN AUTO: 32.7 PG (ref 27–31)
MCHC RBC AUTO-ENTMCNC: 33 G/DL (ref 32–36)
MCV RBC AUTO: 99 FL (ref 82–98)
MONOCYTES # BLD AUTO: 0.4 K/UL (ref 0.3–1)
MONOCYTES NFR BLD: 6.4 % (ref 4–15)
NEUTROPHILS # BLD AUTO: 3.4 K/UL (ref 1.8–7.7)
NEUTROPHILS NFR BLD: 57.3 % (ref 38–73)
NRBC BLD-RTO: 0 /100 WBC
PLATELET # BLD AUTO: 248 K/UL (ref 150–450)
PMV BLD AUTO: 10.9 FL (ref 9.2–12.9)
POTASSIUM SERPL-SCNC: 4.6 MMOL/L (ref 3.5–5.1)
PROT SERPL-MCNC: 7.1 G/DL (ref 6–8.4)
RBC # BLD AUTO: 4.43 M/UL (ref 4.6–6.2)
SODIUM SERPL-SCNC: 142 MMOL/L (ref 136–145)
T4 FREE SERPL-MCNC: 0.94 NG/DL (ref 0.71–1.51)
TSH SERPL DL<=0.005 MIU/L-ACNC: 5.43 UIU/ML (ref 0.4–4)
WBC # BLD AUTO: 5.98 K/UL (ref 3.9–12.7)

## 2023-09-07 PROCEDURE — 1159F PR MEDICATION LIST DOCUMENTED IN MEDICAL RECORD: ICD-10-PCS | Mod: CPTII,S$GLB,, | Performed by: FAMILY MEDICINE

## 2023-09-07 PROCEDURE — 99214 PR OFFICE/OUTPT VISIT, EST, LEVL IV, 30-39 MIN: ICD-10-PCS | Mod: S$GLB,,, | Performed by: FAMILY MEDICINE

## 2023-09-07 PROCEDURE — 99214 OFFICE O/P EST MOD 30 MIN: CPT | Mod: S$GLB,,, | Performed by: FAMILY MEDICINE

## 2023-09-07 PROCEDURE — 36415 COLL VENOUS BLD VENIPUNCTURE: CPT | Performed by: FAMILY MEDICINE

## 2023-09-07 PROCEDURE — 3008F PR BODY MASS INDEX (BMI) DOCUMENTED: ICD-10-PCS | Mod: CPTII,S$GLB,, | Performed by: FAMILY MEDICINE

## 2023-09-07 PROCEDURE — 3008F BODY MASS INDEX DOCD: CPT | Mod: CPTII,S$GLB,, | Performed by: FAMILY MEDICINE

## 2023-09-07 PROCEDURE — 80053 COMPREHEN METABOLIC PANEL: CPT | Performed by: FAMILY MEDICINE

## 2023-09-07 PROCEDURE — 4010F ACE/ARB THERAPY RXD/TAKEN: CPT | Mod: CPTII,S$GLB,, | Performed by: FAMILY MEDICINE

## 2023-09-07 PROCEDURE — 3288F FALL RISK ASSESSMENT DOCD: CPT | Mod: CPTII,S$GLB,, | Performed by: FAMILY MEDICINE

## 2023-09-07 PROCEDURE — 1101F PT FALLS ASSESS-DOCD LE1/YR: CPT | Mod: CPTII,S$GLB,, | Performed by: FAMILY MEDICINE

## 2023-09-07 PROCEDURE — 3079F DIAST BP 80-89 MM HG: CPT | Mod: CPTII,S$GLB,, | Performed by: FAMILY MEDICINE

## 2023-09-07 PROCEDURE — 1159F MED LIST DOCD IN RCRD: CPT | Mod: CPTII,S$GLB,, | Performed by: FAMILY MEDICINE

## 2023-09-07 PROCEDURE — 99999 PR PBB SHADOW E&M-EST. PATIENT-LVL III: ICD-10-PCS | Mod: PBBFAC,,, | Performed by: FAMILY MEDICINE

## 2023-09-07 PROCEDURE — 84439 ASSAY OF FREE THYROXINE: CPT | Performed by: FAMILY MEDICINE

## 2023-09-07 PROCEDURE — 4010F PR ACE/ARB THEARPY RXD/TAKEN: ICD-10-PCS | Mod: CPTII,S$GLB,, | Performed by: FAMILY MEDICINE

## 2023-09-07 PROCEDURE — 3079F PR MOST RECENT DIASTOLIC BLOOD PRESSURE 80-89 MM HG: ICD-10-PCS | Mod: CPTII,S$GLB,, | Performed by: FAMILY MEDICINE

## 2023-09-07 PROCEDURE — 84443 ASSAY THYROID STIM HORMONE: CPT | Performed by: FAMILY MEDICINE

## 2023-09-07 PROCEDURE — 3288F PR FALLS RISK ASSESSMENT DOCUMENTED: ICD-10-PCS | Mod: CPTII,S$GLB,, | Performed by: FAMILY MEDICINE

## 2023-09-07 PROCEDURE — 3074F PR MOST RECENT SYSTOLIC BLOOD PRESSURE < 130 MM HG: ICD-10-PCS | Mod: CPTII,S$GLB,, | Performed by: FAMILY MEDICINE

## 2023-09-07 PROCEDURE — 85025 COMPLETE CBC W/AUTO DIFF WBC: CPT | Performed by: FAMILY MEDICINE

## 2023-09-07 PROCEDURE — 1101F PR PT FALLS ASSESS DOC 0-1 FALLS W/OUT INJ PAST YR: ICD-10-PCS | Mod: CPTII,S$GLB,, | Performed by: FAMILY MEDICINE

## 2023-09-07 PROCEDURE — 99999 PR PBB SHADOW E&M-EST. PATIENT-LVL III: CPT | Mod: PBBFAC,,, | Performed by: FAMILY MEDICINE

## 2023-09-07 PROCEDURE — 3074F SYST BP LT 130 MM HG: CPT | Mod: CPTII,S$GLB,, | Performed by: FAMILY MEDICINE

## 2023-09-07 NOTE — PROGRESS NOTES
Subjective:       Patient ID: Ezio James is a 72 y.o. male.    Chief Complaint: Fatigue    3 weeks of fatigue.  He is concerned about his thyroid.  He has a history of hypothyroidism.  Denies headache changes activity.  Denies any changes medication.  He denies any unusual stress.  He reports he is outside sometimes but feels like he is hydrated.  He denies headache chest pain palpitations shortness of breath or edema.  He is also followed by Cardiology.  Medical history includes chronic atrial fibrillation cardiomyopathy and tremor hypertension B12 deficiency    Fatigue  Associated symptoms include fatigue. Pertinent negatives include no abdominal pain, chest pain, chills, congestion, coughing, fever, headaches or weakness.     Review of Systems   Constitutional:  Positive for fatigue. Negative for activity change, appetite change, chills, fever and unexpected weight change.   HENT:  Negative for congestion.    Respiratory:  Negative for cough, chest tightness, shortness of breath and wheezing.    Cardiovascular:  Negative for chest pain, palpitations and leg swelling.   Gastrointestinal:  Negative for abdominal distention, abdominal pain, constipation and diarrhea.   Genitourinary:  Negative for dysuria, hematuria and urgency.   Neurological:  Negative for dizziness, weakness, light-headedness and headaches.   Psychiatric/Behavioral:  Negative for dysphoric mood. The patient is not nervous/anxious.        Objective:      Physical Exam  Constitutional:       General: He is not in acute distress.     Appearance: He is not ill-appearing or diaphoretic.   HENT:      Ears:      Comments: Decreased hearing  Cardiovascular:      Rate and Rhythm: Normal rate. Rhythm irregular.      Heart sounds: No murmur heard.     No gallop.   Pulmonary:      Effort: Pulmonary effort is normal. No respiratory distress.      Breath sounds: No wheezing or rales.   Abdominal:      General: There is no distension.      Palpations:  Abdomen is soft.   Lymphadenopathy:      Cervical: No cervical adenopathy.   Skin:     General: Skin is warm and dry.      Coloration: Skin is not pale.      Findings: No erythema.   Neurological:      Mental Status: He is alert.   Psychiatric:         Mood and Affect: Mood normal.         Behavior: Behavior normal.         Lab Visit on 06/06/2023   Component Date Value Ref Range Status    Potassium 06/06/2023 4.8  3.5 - 5.1 mmol/L Final     Assessment:       1. Fatigue, unspecified type    2. Essential hypertension    3. Non-ischemic cardiomyopathy    4. Benign essential tremor    5. Other cardiomyopathy    6. Hypothyroidism, unspecified type    7. Atrial fibrillation, chronic    8. B12 deficiency        Plan:     Blood pressure controlled medications reviewed lab was ordered.  He reports bruising of his hand tremor and joint pain have resolved since it has been taking over-the-counter product called super beets    Fatigue, unspecified type    Essential hypertension  -     CBC Auto Differential; Future; Expected date: 09/07/2023  -     Comprehensive Metabolic Panel; Future; Expected date: 09/07/2023    Non-ischemic cardiomyopathy    Benign essential tremor    Other cardiomyopathy    Hypothyroidism, unspecified type  -     TSH; Future; Expected date: 09/07/2023  -     T4, Free; Future; Expected date: 09/07/2023    Atrial fibrillation, chronic    B12 deficiency  -     Vitamin B12; Future; Expected date: 09/07/2023             to lower L LE/minimum assist (75% patients effort)

## 2023-09-10 DIAGNOSIS — E03.9 HYPOTHYROIDISM, UNSPECIFIED TYPE: Primary | ICD-10-CM

## 2023-10-09 ENCOUNTER — LAB VISIT (OUTPATIENT)
Dept: LAB | Facility: HOSPITAL | Age: 73
End: 2023-10-09
Attending: FAMILY MEDICINE
Payer: MEDICARE

## 2023-10-09 DIAGNOSIS — E03.9 HYPOTHYROIDISM, UNSPECIFIED TYPE: ICD-10-CM

## 2023-10-09 LAB
T4 FREE SERPL-MCNC: 0.94 NG/DL (ref 0.71–1.51)
TSH SERPL DL<=0.005 MIU/L-ACNC: 4.92 UIU/ML (ref 0.4–4)

## 2023-10-09 PROCEDURE — 84443 ASSAY THYROID STIM HORMONE: CPT | Performed by: FAMILY MEDICINE

## 2023-10-09 PROCEDURE — 84439 ASSAY OF FREE THYROXINE: CPT | Performed by: FAMILY MEDICINE

## 2023-10-09 PROCEDURE — 36415 COLL VENOUS BLD VENIPUNCTURE: CPT | Performed by: FAMILY MEDICINE

## 2023-10-30 ENCOUNTER — TELEPHONE (OUTPATIENT)
Dept: CARDIOLOGY | Facility: CLINIC | Age: 73
End: 2023-10-30
Payer: MEDICARE

## 2023-10-30 DIAGNOSIS — I10 ESSENTIAL HYPERTENSION: Primary | ICD-10-CM

## 2023-11-01 ENCOUNTER — OFFICE VISIT (OUTPATIENT)
Dept: CARDIOLOGY | Facility: CLINIC | Age: 73
End: 2023-11-01
Payer: MEDICARE

## 2023-11-01 ENCOUNTER — HOSPITAL ENCOUNTER (OUTPATIENT)
Dept: CARDIOLOGY | Facility: HOSPITAL | Age: 73
Discharge: HOME | End: 2023-11-01
Attending: INTERNAL MEDICINE
Payer: MEDICARE

## 2023-11-01 VITALS
WEIGHT: 198 LBS | OXYGEN SATURATION: 98 % | HEIGHT: 70 IN | BODY MASS INDEX: 28.35 KG/M2 | HEART RATE: 77 BPM | DIASTOLIC BLOOD PRESSURE: 80 MMHG | RESPIRATION RATE: 16 BRPM | SYSTOLIC BLOOD PRESSURE: 124 MMHG

## 2023-11-01 DIAGNOSIS — I51.7 LVH (LEFT VENTRICULAR HYPERTROPHY): ICD-10-CM

## 2023-11-01 DIAGNOSIS — E66.3 OVERWEIGHT (BMI 25.0-29.9): ICD-10-CM

## 2023-11-01 DIAGNOSIS — E78.5 DYSLIPIDEMIA: ICD-10-CM

## 2023-11-01 DIAGNOSIS — I10 ESSENTIAL HYPERTENSION: ICD-10-CM

## 2023-11-01 DIAGNOSIS — I42.8 NON-ISCHEMIC CARDIOMYOPATHY: ICD-10-CM

## 2023-11-01 DIAGNOSIS — R94.39 ABNORMAL NUCLEAR STRESS TEST: ICD-10-CM

## 2023-11-01 DIAGNOSIS — I34.0 NONRHEUMATIC MITRAL VALVE REGURGITATION: ICD-10-CM

## 2023-11-01 DIAGNOSIS — R06.09 DOE (DYSPNEA ON EXERTION): ICD-10-CM

## 2023-11-01 DIAGNOSIS — I51.7 LAE (LEFT ATRIAL ENLARGEMENT): ICD-10-CM

## 2023-11-01 DIAGNOSIS — I50.42 CHRONIC COMBINED SYSTOLIC AND DIASTOLIC CONGESTIVE HEART FAILURE: ICD-10-CM

## 2023-11-01 DIAGNOSIS — I48.20 ATRIAL FIBRILLATION, CHRONIC: Primary | ICD-10-CM

## 2023-11-01 DIAGNOSIS — I27.20 PULMONARY HYPERTENSION: ICD-10-CM

## 2023-11-01 DIAGNOSIS — E78.6 LOW HDL (UNDER 40): ICD-10-CM

## 2023-11-01 DIAGNOSIS — R94.31 ABNORMAL ECG: ICD-10-CM

## 2023-11-01 DIAGNOSIS — Z91.89 AT RISK FOR SLEEP APNEA: ICD-10-CM

## 2023-11-01 PROCEDURE — 99999 PR PBB SHADOW E&M-EST. PATIENT-LVL III: ICD-10-PCS | Mod: PBBFAC,,, | Performed by: INTERNAL MEDICINE

## 2023-11-01 PROCEDURE — 99214 OFFICE O/P EST MOD 30 MIN: CPT | Mod: S$GLB,,, | Performed by: INTERNAL MEDICINE

## 2023-11-01 PROCEDURE — 1159F PR MEDICATION LIST DOCUMENTED IN MEDICAL RECORD: ICD-10-PCS | Mod: CPTII,S$GLB,, | Performed by: INTERNAL MEDICINE

## 2023-11-01 PROCEDURE — 3079F PR MOST RECENT DIASTOLIC BLOOD PRESSURE 80-89 MM HG: ICD-10-PCS | Mod: CPTII,S$GLB,, | Performed by: INTERNAL MEDICINE

## 2023-11-01 PROCEDURE — 3074F SYST BP LT 130 MM HG: CPT | Mod: CPTII,S$GLB,, | Performed by: INTERNAL MEDICINE

## 2023-11-01 PROCEDURE — 4010F ACE/ARB THERAPY RXD/TAKEN: CPT | Mod: CPTII,S$GLB,, | Performed by: INTERNAL MEDICINE

## 2023-11-01 PROCEDURE — 4010F PR ACE/ARB THEARPY RXD/TAKEN: ICD-10-PCS | Mod: CPTII,S$GLB,, | Performed by: INTERNAL MEDICINE

## 2023-11-01 PROCEDURE — 3008F BODY MASS INDEX DOCD: CPT | Mod: CPTII,S$GLB,, | Performed by: INTERNAL MEDICINE

## 2023-11-01 PROCEDURE — 93010 ELECTROCARDIOGRAM REPORT: CPT | Mod: ,,, | Performed by: INTERNAL MEDICINE

## 2023-11-01 PROCEDURE — 99214 PR OFFICE/OUTPT VISIT, EST, LEVL IV, 30-39 MIN: ICD-10-PCS | Mod: S$GLB,,, | Performed by: INTERNAL MEDICINE

## 2023-11-01 PROCEDURE — 1159F MED LIST DOCD IN RCRD: CPT | Mod: CPTII,S$GLB,, | Performed by: INTERNAL MEDICINE

## 2023-11-01 PROCEDURE — 93005 ELECTROCARDIOGRAM TRACING: CPT

## 2023-11-01 PROCEDURE — 1160F RVW MEDS BY RX/DR IN RCRD: CPT | Mod: CPTII,S$GLB,, | Performed by: INTERNAL MEDICINE

## 2023-11-01 PROCEDURE — 3008F PR BODY MASS INDEX (BMI) DOCUMENTED: ICD-10-PCS | Mod: CPTII,S$GLB,, | Performed by: INTERNAL MEDICINE

## 2023-11-01 PROCEDURE — 99999 PR PBB SHADOW E&M-EST. PATIENT-LVL III: CPT | Mod: PBBFAC,,, | Performed by: INTERNAL MEDICINE

## 2023-11-01 PROCEDURE — 3074F PR MOST RECENT SYSTOLIC BLOOD PRESSURE < 130 MM HG: ICD-10-PCS | Mod: CPTII,S$GLB,, | Performed by: INTERNAL MEDICINE

## 2023-11-01 PROCEDURE — 1126F PR PAIN SEVERITY QUANTIFIED, NO PAIN PRESENT: ICD-10-PCS | Mod: CPTII,S$GLB,, | Performed by: INTERNAL MEDICINE

## 2023-11-01 PROCEDURE — 93010 EKG 12-LEAD: ICD-10-PCS | Mod: ,,, | Performed by: INTERNAL MEDICINE

## 2023-11-01 PROCEDURE — 1126F AMNT PAIN NOTED NONE PRSNT: CPT | Mod: CPTII,S$GLB,, | Performed by: INTERNAL MEDICINE

## 2023-11-01 PROCEDURE — 1160F PR REVIEW ALL MEDS BY PRESCRIBER/CLIN PHARMACIST DOCUMENTED: ICD-10-PCS | Mod: CPTII,S$GLB,, | Performed by: INTERNAL MEDICINE

## 2023-11-01 PROCEDURE — 3079F DIAST BP 80-89 MM HG: CPT | Mod: CPTII,S$GLB,, | Performed by: INTERNAL MEDICINE

## 2023-11-01 NOTE — PROGRESS NOTES
Subjective:    Patient ID:  Ezio James is a 72 y.o. male who presents for evaluation of Atrial Fibrillation, Hyperlipidemia, Hypertension, and Congestive Heart Failure        HPIPt presents for eval.   His current med conditions include  HTN, hyperlipidemia, CAD, LAE, MR, TR, PHTN, TR, CHF, LVH, non-ischemic cardiomyopathy, permanent a fib.   Nonsmoker.   Past hx pertinent for following:  He has declined anticoagulation multiple times in past (coumadin and NOAC discussed, see prior notes), wanted to stay on asa.   2014 Echo 40 - 45%.  Stress MPI 2014 EF 35%.  S/p [facility] 6/18 for abnl stress test and to more definitively assess LVEF.  Cath showed widely patent coronary arteries (only luminal irregularities) and LVEF 50 - 55%  Echo 7/21 EF 35%, LAE, mild MR/TR.  Echo 2/22 EF 45%, LAE, BLAZE, mod MR, mild TR.  Ecg 8/18/22 a fib with controlled VR, low precordial R waves.  Now here.  No angina.  Has DENNISON, chronic.  No pnd/orthopnea.  No syncope.  Minimal leg edema.  States resolved w OTC Beets chews.  Ecg today 11/1/23 a fib w controlled VR, low R waves with possible old anterior infarct.  No acute changes.  Echo April 2023: LVEF 45%, DD, LAE, normal RVEF, mod MR, mild-mod TR, BLAZE, PAP 44 mmHg.  Weight stable.           Past Medical History:   Diagnosis Date    Atrial fibrillation, chronic     Chronic systolic congestive heart failure 5/4/2018    Colon polyp 04,15    Essential hypertension 6/14/2017    Pulmonary hypertension 11/1/2023    Subclinical hypothyroidism      Current Outpatient Medications   Medication Instructions    aspirin (ECOTRIN) 81 mg, Oral, Daily    cholecalciferol (vitamin D3) 2,000 Units, Oral, Daily    cyanocobalamin (VITAMIN B-12) 1,000 mcg, Oral, Daily    levothyroxine (SYNTHROID) 88 MCG tablet TAKE 1 TABLET BEFORE BREAKFAST    lisinopriL 10 mg, Oral, Daily    metoprolol succinate (TOPROL-XL) 50 MG 24 hr tablet TAKE 1 TABLET DAILY    multivitamin capsule 1 capsule, Oral, Daily    primidone (MYSOLINE)  "50 mg, Oral, 2 times daily    rosuvastatin (CRESTOR) 10 mg, Oral, Nightly    tamsulosin (FLOMAX) 0.4 mg, Oral, Daily       Review of Systems   Constitutional: Positive for malaise/fatigue.   HENT: Negative.     Eyes: Negative.    Cardiovascular:  Positive for dyspnea on exertion.   Respiratory:  Positive for shortness of breath and snoring.    Endocrine: Negative.    Hematologic/Lymphatic: Negative.    Skin: Negative.    Musculoskeletal:  Positive for arthritis and joint pain.   Gastrointestinal: Negative.    Genitourinary: Negative.    Neurological: Negative.    Psychiatric/Behavioral: Negative.     Allergic/Immunologic: Negative.           /80 (BP Location: Left arm, Patient Position: Sitting, BP Method: Large (Manual))   Pulse 77   Resp 16   Ht 5' 10" (1.778 m)   Wt 89.8 kg (197 lb 15.6 oz)   SpO2 98%   BMI 28.41 kg/m²     Wt Readings from Last 3 Encounters:   11/01/23 89.8 kg (197 lb 15.6 oz)   09/07/23 89.2 kg (196 lb 10.4 oz)   06/06/23 92.1 kg (203 lb 0.7 oz)     Temp Readings from Last 3 Encounters:   09/07/23 96 °F (35.6 °C) (Tympanic)   06/06/23 98.3 °F (36.8 °C) (Temporal)   03/13/23 98 °F (36.7 °C)     BP Readings from Last 3 Encounters:   11/01/23 124/80   09/07/23 122/86   06/06/23 130/72     Pulse Readings from Last 3 Encounters:   11/01/23 77   09/07/23 83   06/06/23 71         Objective:    Physical Exam  Vitals and nursing note reviewed.   Constitutional:       Appearance: He is well-developed.   HENT:      Head: Normocephalic.   Neck:      Thyroid: No thyromegaly.      Vascular: No carotid bruit or JVD.   Cardiovascular:      Rate and Rhythm: Normal rate. Rhythm irregularly irregular.      Pulses:           Radial pulses are 2+ on the right side and 2+ on the left side.      Heart sounds: S1 normal and S2 normal. Heart sounds not distant. No midsystolic click and no opening snap. No murmur heard.     No friction rub. No S3 or S4 sounds.   Pulmonary:      Effort: Pulmonary effort is " "normal.      Breath sounds: Normal breath sounds. No wheezing or rales.   Abdominal:      General: Bowel sounds are normal. There is no distension or abdominal bruit.      Palpations: Abdomen is soft. There is no mass.      Tenderness: There is no abdominal tenderness.   Musculoskeletal:      Cervical back: Neck supple.      Right lower leg: Edema (trace) present.      Left lower leg: Edema (trace) present.   Skin:     General: Skin is warm.   Neurological:      Mental Status: He is alert and oriented to person, place, and time.   Psychiatric:         Behavior: Behavior normal.         I have reviewed all pertinent labs and cardiac studies.        Component      Latest Ref Rng 3/29/2023   BNP      0 - 99 pg/mL 68          Chemistry        Component Value Date/Time     09/07/2023 0738    K 4.6 09/07/2023 0738     09/07/2023 0738    CO2 24 09/07/2023 0738    BUN 14 09/07/2023 0738    CREATININE 0.9 09/07/2023 0738    GLU 88 09/07/2023 0738        Component Value Date/Time    CALCIUM 9.0 09/07/2023 0738    ALKPHOS 50 (L) 09/07/2023 0738    AST 20 09/07/2023 0738    ALT 11 09/07/2023 0738    BILITOT 0.7 09/07/2023 0738    ESTGFRAFRICA >60.0 02/01/2022 1020    EGFRNONAA >60.0 02/01/2022 1020        Lab Results   Component Value Date    WBC 5.98 09/07/2023    HGB 14.5 09/07/2023    HCT 44.0 09/07/2023    MCV 99 (H) 09/07/2023     09/07/2023       No results found for: "LABA1C", "HGBA1C"  Lab Results   Component Value Date    CHOL 130 02/01/2023    CHOL 124 02/01/2022    CHOL 120 02/18/2021     Lab Results   Component Value Date    HDL 42 02/01/2023    HDL 40 02/01/2022    HDL 41 02/18/2021     Lab Results   Component Value Date    LDLCALC 67.6 02/01/2023    LDLCALC 61.0 (L) 02/01/2022    LDLCALC 58.6 (L) 02/18/2021     Lab Results   Component Value Date    TRIG 102 02/01/2023    TRIG 115 02/01/2022    TRIG 102 02/18/2021     Lab Results   Component Value Date    CHOLHDL 32.3 02/01/2023    CHOLHDL 32.3 " 02/01/2022    CHOLHDL 34.2 02/18/2021           Results for orders placed during the hospital encounter of 04/21/23    Echo    Interpretation Summary  · The left ventricle is moderately enlarged with concentric hypertrophy and mildly decreased systolic function.  · Severe left atrial enlargement.  · The estimated ejection fraction is 45%.  · Left ventricular diastolic dysfunction.  · There is abnormal septal wall motion. There is systolic flattening of the interventricular septum consistent with right ventricle pressure overload.  · Normal right ventricular size with normal right ventricular systolic function.  · Severe right atrial enlargement.  · Moderate mitral regurgitation.  · Mild to moderate tricuspid regurgitation.  · Mild pulmonic regurgitation.  · Elevated central venous pressure (15 mmHg).  · The estimated PA systolic pressure is 44 mmHg.  · There is pulmonary hypertension.            Assessment:       1. Atrial fibrillation, chronic    2. Abnormal ECG    3. Chronic combined systolic and diastolic congestive heart failure    4. Dyslipidemia    5. DENNISON (dyspnea on exertion)    6. Essential hypertension    7. LVH (left ventricular hypertrophy)    8. Low HDL (under 40)    9. LAE (left atrial enlargement)    10. Nonrheumatic mitral valve regurgitation    11. Non-ischemic cardiomyopathy    12. Overweight (BMI 25.0-29.9)    13. Abnormal nuclear stress test    14. Pulmonary hypertension    15. At risk for sleep apnea           Plan:             Stable cardiovascular conditions at present time on current medical treatment.  Reviewed all tests and above medical conditions with patient in detail and formulated treatment plan.  Continue optimal medical treatment for cardiovascular conditions.  Nonischemic cardiomyopathy: stable. Med tx.   Leg edema: low salt, compression stockings, elevate legs.   Chronic a fib: rate controlled. Continue asa (has declined full anticoagulation in past on multiple discussions).  Monitor.  Once again, discussed indications for DOAC for a fib, risks/benefits and indicated over asa qd.  He is reluctant to switch but will think about it.  Cardiac low salt diet advised.  PHTN: Mild on April 2023 echo.    May be from [facility] -- offered Pulmonary consult.  Daily exercise encouraged, with the goal 30 +  minutes aerobic exercise as tolerated.  Maintaining healthy weight and weight loss goals (if needed) were discussed in clinic.  HTN: Stable. Need for BP control and HTN goals (if needed) were discussed and tx plan formulated.  Goal < 130/80.  Continue current HTN meds.   Hyperlipidemia:  Importance of optimal lipid control were discussed in detail as well as possible pharmacologic and lifestyle changes that may be needed.  Continue statin tx.   Valvular heart disease: monitor.  F/u echo in future.      F/u in 6 months.      I have reviewed all pertinent labs and cardiac studies independently. Plans and recommendations have been formulated under my direct supervision. All questions answered and patient voiced understanding.

## 2023-11-02 ENCOUNTER — PATIENT MESSAGE (OUTPATIENT)
Dept: PULMONOLOGY | Facility: CLINIC | Age: 73
End: 2023-11-02
Payer: MEDICARE

## 2023-12-28 ENCOUNTER — NURSE TRIAGE (OUTPATIENT)
Dept: ADMINISTRATIVE | Facility: CLINIC | Age: 73
End: 2023-12-28
Payer: MEDICARE

## 2023-12-29 DIAGNOSIS — G25.0 BENIGN ESSENTIAL TREMOR: ICD-10-CM

## 2023-12-29 RX ORDER — PRIMIDONE 50 MG/1
50 TABLET ORAL 2 TIMES DAILY
Qty: 60 TABLET | Refills: 11 | Status: SHIPPED | OUTPATIENT
Start: 2023-12-29 | End: 2024-01-19 | Stop reason: SDUPTHER

## 2023-12-29 NOTE — TELEPHONE ENCOUNTER
Caller states that he is almost out Mysoline 50mg tabs, caller states that he is out of refills after Sunday. Caller states that pharmacy states that pt is out of refills. Caller EMR states refills should be available. Caller advised to contact MD's office and pharmacy in AM. Pt states he may need medication sent to local pharmacy. Please contact pt for further assistance with prescription.  Pt advised per protocol and verbalized understanding.   Reason for Disposition   [1] Caller has NON-URGENT medicine question about med that PCP prescribed AND [2] triager unable to answer question    Additional Information   Negative: [1] Prescription refill request for ESSENTIAL medicine (i.e., likelihood of harm to patient if not taken) AND [2] triager unable to refill per department policy   Negative: [1] Prescription not at pharmacy AND [2] was prescribed by PCP recently  (Exception: Triager has access to EMR and prescription is recorded there. Go to Home Care and confirm for pharmacy.)   Negative: [1] Pharmacy calling with prescription questions AND [2] triager unable to answer question   Negative: Prescription request for new medicine (not a refill)   Negative: Caller requesting a CONTROLLED substance prescription refill (e.g., narcotics, ADHD medicines)   Negative: [1] Prescription refill request for NON-ESSENTIAL medicine (i.e., no harm to patient if med not taken) AND [2] triager unable to refill per department policy    Protocols used: Medication Refill and Renewal Call-A-

## 2023-12-29 NOTE — TELEPHONE ENCOUNTER
I attempted to contact patient in regards to seeing what local pharmacy he would like us to send the prescription to. No answer but I left a VM to contact our office to further assist.

## 2024-01-19 DIAGNOSIS — I10 ESSENTIAL HYPERTENSION: ICD-10-CM

## 2024-01-19 DIAGNOSIS — G25.0 BENIGN ESSENTIAL TREMOR: ICD-10-CM

## 2024-01-19 RX ORDER — PRIMIDONE 50 MG/1
50 TABLET ORAL 2 TIMES DAILY
Qty: 60 TABLET | Refills: 11 | Status: SHIPPED | OUTPATIENT
Start: 2024-01-19 | End: 2025-01-18

## 2024-01-19 RX ORDER — LISINOPRIL 10 MG/1
10 TABLET ORAL DAILY
Qty: 90 TABLET | Refills: 3 | Status: SHIPPED | OUTPATIENT
Start: 2024-01-19

## 2024-01-19 NOTE — TELEPHONE ENCOUNTER
----- Message from Sully Prince sent at 1/19/2024  8:31 AM CST -----  Type:  RX Refill Request    Who Called: pt  Refill or New Rx:refill  RX Name and Strength:primidone (MYSOLINE) 50 MG Tab  How is the patient currently taking it? (ex. 1XDay):  Is this a 30 day or 90 day RX:90  [facility]  Local or Mail Order:mail  Ordering Provider:  Would the patient rather a call back or a response via [facility]? call  Best Call Back Number:[phone]  Additional Information: New script with refills

## 2024-01-19 NOTE — TELEPHONE ENCOUNTER
----- Message from Sully Prince sent at 1/19/2024  8:31 AM CST -----  Type:  RX Refill Request    Who Called: pt  Refill or New Rx:refill  RX Name and Strength:primidone (MYSOLINE) 50 MG Tab  How is the patient currently taking it? (ex. 1XDay):  Is this a 30 day or 90 day RX:90  [facility]  Local or Mail Order:[facility]  Ordering Provider:  Would the patient rather a call back or a response via [facility]? call  Best Call Back Number:[phone]  Additional Information: New script with refills

## 2024-03-13 ENCOUNTER — OFFICE VISIT (OUTPATIENT)
Dept: UROLOGY | Facility: CLINIC | Age: 74
End: 2024-03-13
Payer: MEDICARE

## 2024-03-13 ENCOUNTER — LAB VISIT (OUTPATIENT)
Dept: LAB | Facility: HOSPITAL | Age: 74
End: 2024-03-13
Attending: NURSE PRACTITIONER
Payer: MEDICARE

## 2024-03-13 VITALS
HEART RATE: 80 BPM | WEIGHT: 198 LBS | RESPIRATION RATE: 16 BRPM | DIASTOLIC BLOOD PRESSURE: 93 MMHG | BODY MASS INDEX: 28.41 KG/M2 | SYSTOLIC BLOOD PRESSURE: 143 MMHG

## 2024-03-13 DIAGNOSIS — R31.0 HEMATURIA, GROSS: ICD-10-CM

## 2024-03-13 DIAGNOSIS — R35.0 BENIGN PROSTATIC HYPERPLASIA WITH URINARY FREQUENCY: Primary | ICD-10-CM

## 2024-03-13 DIAGNOSIS — N40.1 BENIGN PROSTATIC HYPERPLASIA WITH URINARY FREQUENCY: Primary | ICD-10-CM

## 2024-03-13 DIAGNOSIS — Z12.5 PROSTATE CANCER SCREENING: ICD-10-CM

## 2024-03-13 PROBLEM — Z02.89 ENCOUNTER FOR COMPLETION OF FORM WITH PATIENT: Status: RESOLVED | Noted: 2018-04-24 | Resolved: 2024-03-13

## 2024-03-13 PROBLEM — R53.83 FATIGUE: Status: RESOLVED | Noted: 2023-09-07 | Resolved: 2024-03-13

## 2024-03-13 LAB
COMPLEXED PSA SERPL-MCNC: 0.25 NG/ML (ref 0–4)
CREAT SERPL-MCNC: 0.9 MG/DL (ref 0.5–1.4)
EST. GFR  (NO RACE VARIABLE): >60 ML/MIN/1.73 M^2

## 2024-03-13 PROCEDURE — 84153 ASSAY OF PSA TOTAL: CPT | Performed by: NURSE PRACTITIONER

## 2024-03-13 PROCEDURE — 3080F DIAST BP >= 90 MM HG: CPT | Mod: CPTII,S$GLB,, | Performed by: NURSE PRACTITIONER

## 2024-03-13 PROCEDURE — 3288F FALL RISK ASSESSMENT DOCD: CPT | Mod: CPTII,S$GLB,, | Performed by: NURSE PRACTITIONER

## 2024-03-13 PROCEDURE — 1159F MED LIST DOCD IN RCRD: CPT | Mod: CPTII,S$GLB,, | Performed by: NURSE PRACTITIONER

## 2024-03-13 PROCEDURE — 1101F PT FALLS ASSESS-DOCD LE1/YR: CPT | Mod: CPTII,S$GLB,, | Performed by: NURSE PRACTITIONER

## 2024-03-13 PROCEDURE — 82565 ASSAY OF CREATININE: CPT | Performed by: NURSE PRACTITIONER

## 2024-03-13 PROCEDURE — 3008F BODY MASS INDEX DOCD: CPT | Mod: CPTII,S$GLB,, | Performed by: NURSE PRACTITIONER

## 2024-03-13 PROCEDURE — 4010F ACE/ARB THERAPY RXD/TAKEN: CPT | Mod: CPTII,S$GLB,, | Performed by: NURSE PRACTITIONER

## 2024-03-13 PROCEDURE — 99215 OFFICE O/P EST HI 40 MIN: CPT | Mod: S$GLB,,, | Performed by: NURSE PRACTITIONER

## 2024-03-13 PROCEDURE — 36415 COLL VENOUS BLD VENIPUNCTURE: CPT | Performed by: NURSE PRACTITIONER

## 2024-03-13 PROCEDURE — 99999 PR PBB SHADOW E&M-EST. PATIENT-LVL III: CPT | Mod: PBBFAC,,, | Performed by: NURSE PRACTITIONER

## 2024-03-13 PROCEDURE — 3077F SYST BP >= 140 MM HG: CPT | Mod: CPTII,S$GLB,, | Performed by: NURSE PRACTITIONER

## 2024-03-13 RX ORDER — TAMSULOSIN HYDROCHLORIDE 0.4 MG/1
0.4 CAPSULE ORAL DAILY
Qty: 90 CAPSULE | Refills: 3 | Status: SHIPPED | OUTPATIENT
Start: 2024-03-13 | End: 2025-03-08

## 2024-03-13 NOTE — PROGRESS NOTES
Chief Complaint:   BPH  Prostate cancer screening  Gross hematuria    HPI:   Patient is presenting for his annual prostate exam.  Is due for PSA.  No past elevated PSAs or prostate biopsies.  States that he was taking over-the-counter cold medication for 3 days and had several episodes of gross hematuria.  Patient is concerned that cold medication caused the gross hematuria.  Urine in clinic is negative and PVR is 17 mL.  No history of smoking cigarettes.  No  cancers in his family.  Had a negative gross hematuria workup, in 2016, by Dr. Garces.  No history of stones.  Is currently on tamsulosin with a complete resolution to all BPH symptoms without adverse side effects.  03/13/2023  Patient is a 72-year-old male that is presenting as a follow-up to tamsulosin.  Patient was started on tamsulosin secondary to complaints of nocturia and a slow urinary stream.  Reports that nocturia has decreased to once nightly and daytime stream is strong.  Denies adverse side effects to tamsulosin.  Recent PSA was normal.  Urine in clinic is negative and PVR was 19 mL.  02/13/2023  Patient is a 72-year-old male that is presenting with an increase in nocturia, 3-4 times nightly in a slow daytime urinary stream.  No BPH meds.  Recent PSA was 0.20 urine in clinic is negative and PVR is 22 mL.No abd/pelvic pain and no exac/rel factors.  No hematuria.  No urolithiasis.        Allergies:  Patient has no known allergies.    Medications:  has a current medication list which includes the following prescription(s): aspirin, cholecalciferol (vitamin d3), cyanocobalamin, levothyroxine, lisinopril, metoprolol succinate, multivitamin, primidone, rosuvastatin, and tamsulosin.    Review of Systems:  General: No fever, chills, fatigability, or weight loss.  Skin: No rashes, itching, or changes in color or texture of skin.  Chest: Denies DENNISON, cyanosis, wheezing, cough, and sputum production.  Abdomen: Appetite fine. No weight loss. Denies  diarrhea, abdominal pain, hematemesis, or blood in stool.  Musculoskeletal: No joint stiffness or swelling. Denies back pain.  : As above.  All other review of systems negative.    PMH:   has a past medical history of Atrial fibrillation, chronic, Chronic systolic congestive heart failure (5/4/2018), Colon polyp (04,15), Essential hypertension (6/14/2017), Pulmonary hypertension (11/1/2023), and Subclinical hypothyroidism.    PSH:   has a past surgical history that includes Colonoscopy (2004,5/15) and Left heart catheterization (Left, 6/21/2018).    FamHx: family history includes Cataracts in his mother; Emphysema in his father; Heart disease in his father; Hypertension in his mother; Lung cancer in his mother; Obesity in his mother.    SocHx:  reports that he has never smoked. He has never used smokeless tobacco. He reports current alcohol use. He reports that he does not use drugs.      Physical Exam:  General: A&Ox3, no apparent distress, no deformities  Neck: No masses, normal thyroid  Lungs: normal inspiration, no use of accessory muscles  Heart: normal pulse, no arrhythmias  Abdomen: Soft, NT, ND, no masses, no hernias, no hepatosplenomegaly  Lymphatic: Neck and groin nodes negative  Skin: The skin is warm and dry. No jaundice.  Ext: No c/c/e.  : Test desc chris, no abnormalities of epididymus. Penis uncircumcised with normal penile and scrotal skin. Meatus normal. Normal rectal tone, no hemorrhoids. Prost 35 gm no nodules or masses appreciated. SV not palpable. Perineum and anus normal.    Labs/Studies:    Latest Reference Range & Units 03/13/14 12:43 05/20/16 14:48 04/24/18 13:51 11/07/19 09:17 02/18/21 09:18 02/01/23 08:31   Prostate Specific Antigen 0.00 - 4.00 ng/mL 0.33 0.39 0.34 0.31 0.21 0.20       Impression/Plan:   1. Prostate cancer screening  LEANNE is unremarkable and patient was sent to lab for PSA.  Will contact him with results and needed follow-up    2. BPH  Patient to remain on current dose of  tamsulosin.  Refill for 12 months was sent to his pharmacy.    3.Gross hematuria  I reviewed the definition of gross hematuria and the etiology of microscopic hematuria with the patient including benign conditions such as kidney, ureteral, and bladder stones, kidney and bladder infections, large prostates(if she were male), but also noted that the most concerning etiology that we must rule out a is a renal or bladder neoplasm.  I reviewed the typical workup of  hematuria which includes an imaging study, typically a CT urogram, and an office cystoscopy. We will move forward with scheduling a CT urogram with a office cystoscopy to follow.      F/u 1-2 weeks with CT urogram and office cystoscopy

## 2024-03-17 DIAGNOSIS — E78.5 DYSLIPIDEMIA: ICD-10-CM

## 2024-03-18 RX ORDER — ROSUVASTATIN CALCIUM 10 MG/1
10 TABLET, COATED ORAL NIGHTLY
Qty: 90 TABLET | Refills: 3 | Status: SHIPPED | OUTPATIENT
Start: 2024-03-18

## 2024-03-18 NOTE — TELEPHONE ENCOUNTER
Refill Routing Note   Medication(s) are not appropriate for processing by [facility] for the following reason(s):        Required labs outdated    [facility] action(s):  Defer     Requires labs : Yes             Appointments  past 12m or future 3m with PCP    Date Provider   Last Visit   9/7/2023 Darren Nova MD   Next Visit   Visit date not found Darren Nova MD   ED visits in past 90 days: 0        Note composed:10:17 AM 03/18/2024

## 2024-03-18 NOTE — TELEPHONE ENCOUNTER
Care Due:                  Date            Visit Type   Department     Provider  --------------------------------------------------------------------------------                                EP -                              PRIMARY      [facility]  Last Visit: 09-      CARE ([facility])   MEDICINE       Darren Nova  Next Visit: None Scheduled  None         None Found                                                            Last  Test          Frequency    Reason                     Performed    Due Date  --------------------------------------------------------------------------------    Lipid Panel.  12 months..  rosuvastatin.............  02- 01-    [facility] Embedded Care Due Messages. Reference number: [id number].   3/17/2024 11:49:48 PM CDT

## 2024-03-28 ENCOUNTER — HOSPITAL ENCOUNTER (OUTPATIENT)
Dept: RADIOLOGY | Facility: HOSPITAL | Age: 74
Discharge: HOME | End: 2024-03-28
Attending: NURSE PRACTITIONER
Payer: MEDICARE

## 2024-03-28 DIAGNOSIS — R31.0 HEMATURIA, GROSS: ICD-10-CM

## 2024-03-28 PROCEDURE — 74178 CT ABD&PLV WO CNTR FLWD CNTR: CPT | Mod: 26,,, | Performed by: RADIOLOGY

## 2024-03-28 PROCEDURE — 25500020 PHARM REV CODE 255: Performed by: NURSE PRACTITIONER

## 2024-03-28 PROCEDURE — 74178 CT ABD&PLV WO CNTR FLWD CNTR: CPT | Mod: TC

## 2024-03-28 RX ADMIN — IOHEXOL 100 ML: 350 INJECTION, SOLUTION INTRAVENOUS at 10:03

## 2024-04-15 ENCOUNTER — OFFICE VISIT (OUTPATIENT)
Dept: UROLOGY | Facility: CLINIC | Age: 74
End: 2024-04-15
Payer: MEDICARE

## 2024-04-15 VITALS
HEART RATE: 93 BPM | HEIGHT: 70 IN | RESPIRATION RATE: 16 BRPM | SYSTOLIC BLOOD PRESSURE: 141 MMHG | BODY MASS INDEX: 28.81 KG/M2 | WEIGHT: 201.25 LBS | DIASTOLIC BLOOD PRESSURE: 93 MMHG

## 2024-04-15 DIAGNOSIS — R31.0 HEMATURIA, GROSS: ICD-10-CM

## 2024-04-15 DIAGNOSIS — N40.1 BENIGN PROSTATIC HYPERPLASIA WITH URINARY FREQUENCY: Primary | ICD-10-CM

## 2024-04-15 DIAGNOSIS — R35.0 BENIGN PROSTATIC HYPERPLASIA WITH URINARY FREQUENCY: Primary | ICD-10-CM

## 2024-04-15 LAB
BILIRUB UR QL STRIP: NEGATIVE
GLUCOSE UR QL STRIP: NEGATIVE
KETONES UR QL STRIP: NEGATIVE
LEUKOCYTE ESTERASE UR QL STRIP: NEGATIVE
PH, POC UA: 5.5
POC BLOOD, URINE: NEGATIVE
POC NITRATES, URINE: NEGATIVE
PROT UR QL STRIP: NEGATIVE
SP GR UR STRIP: 1.01 (ref 1–1.03)
UROBILINOGEN UR STRIP-ACNC: 0.2 (ref 0.3–2.2)

## 2024-04-15 PROCEDURE — 1101F PT FALLS ASSESS-DOCD LE1/YR: CPT | Mod: CPTII,S$GLB,, | Performed by: UROLOGY

## 2024-04-15 PROCEDURE — 81003 URINALYSIS AUTO W/O SCOPE: CPT | Mod: QW,S$GLB,, | Performed by: UROLOGY

## 2024-04-15 PROCEDURE — 3080F DIAST BP >= 90 MM HG: CPT | Mod: CPTII,S$GLB,, | Performed by: UROLOGY

## 2024-04-15 PROCEDURE — 99999 PR PBB SHADOW E&M-EST. PATIENT-LVL III: CPT | Mod: PBBFAC,,, | Performed by: UROLOGY

## 2024-04-15 PROCEDURE — 4010F ACE/ARB THERAPY RXD/TAKEN: CPT | Mod: CPTII,S$GLB,, | Performed by: UROLOGY

## 2024-04-15 PROCEDURE — 1126F AMNT PAIN NOTED NONE PRSNT: CPT | Mod: CPTII,S$GLB,, | Performed by: UROLOGY

## 2024-04-15 PROCEDURE — 3288F FALL RISK ASSESSMENT DOCD: CPT | Mod: CPTII,S$GLB,, | Performed by: UROLOGY

## 2024-04-15 PROCEDURE — 3077F SYST BP >= 140 MM HG: CPT | Mod: CPTII,S$GLB,, | Performed by: UROLOGY

## 2024-04-15 PROCEDURE — 99214 OFFICE O/P EST MOD 30 MIN: CPT | Mod: S$GLB,,, | Performed by: UROLOGY

## 2024-04-15 NOTE — PROGRESS NOTES
Chief Complaint:   Encounter Diagnoses   Name Primary?    Benign prostatic hyperplasia with urinary frequency Yes    Hematuria, gross        HPI:  HPI Ezio James jordi 73 y.o. male who presents with follow up to gross hematuria.  Patient was seen by Ame after he was self treating himself for a cough and cold and noticed some gross hematuria for 2-3 days.  This has resolved.  Patient did undergo a CT urogram which was unrevealing.  He returns today stating that he thinks the medication caused the problem.  He has had a cystoscopy in 2016 by Dr. Garces due to gross hematuria.  At that time he was found to have a 10 Pashto urethral stricture.  Patient states his stream currently is good.  He did have some urinary urgency but this seems improved on tamsulosin which was recently started.    History:  Social History     Tobacco Use    Smoking status: Never    Smokeless tobacco: Never   Substance Use Topics    Alcohol use: Yes     Comment: Rarely    Drug use: No     Past Medical History:   Diagnosis Date    Atrial fibrillation, chronic     Chronic systolic congestive heart failure 5/4/2018    Colon polyp 04,15    Essential hypertension 6/14/2017    Pulmonary hypertension 11/1/2023    Subclinical hypothyroidism      Past Surgical History:   Procedure Laterality Date    COLONOSCOPY  2004,5/15    with polyps per Chart    LEFT HEART CATHETERIZATION Left 6/21/2018    Procedure: HEART CATH-LEFT;  Surgeon: Leon James MD;  Location: [facility] CATH LAB;  Service: Cardiology;  Laterality: Left;  7:30 start time  right radial approach     Family History   Problem Relation Name Age of Onset    Lung cancer Mother      Hypertension Mother      Obesity Mother      Cataracts Mother      Emphysema Father      Heart disease Father         Current Outpatient Medications on File Prior to Visit   Medication Sig Dispense Refill    aspirin (ECOTRIN) 81 MG EC tablet Take 1 tablet (81 mg total) by mouth once daily. 90 tablet 2     "cholecalciferol, vitamin D3, 125 mcg (5,000 unit) Tab Take 2,000 Units by mouth once daily.      cyanocobalamin (VITAMIN B-12) 1000 MCG tablet Take 1,000 mcg by mouth once daily.      levothyroxine (SYNTHROID) 88 MCG tablet TAKE 1 TABLET BEFORE BREAKFAST 90 tablet 3    lisinopriL 10 MG tablet Take 1 tablet (10 mg total) by mouth once daily. 90 tablet 3    metoprolol succinate (TOPROL-XL) 50 MG 24 hr tablet TAKE 1 TABLET DAILY 90 tablet 3    multivitamin capsule Take 1 capsule by mouth once daily.      primidone (MYSOLINE) 50 MG Tab Take 1 tablet (50 mg total) by mouth 2 (two) times a day. 60 tablet 11    rosuvastatin (CRESTOR) 10 MG tablet TAKE 1 TABLET EVERY EVENING 90 tablet 3    tamsulosin (FLOMAX) 0.4 mg Cap Take 1 capsule (0.4 mg total) by mouth once daily. 90 capsule 3     No current facility-administered medications on file prior to visit.        Objective:     Vitals:    04/15/24 0942   BP: (!) 141/93   BP Location: Left arm   Patient Position: Sitting   Pulse: 93   Resp: 16   Weight: 91.3 kg (201 lb 4.5 oz)   Height: 5' 10" (1.778 m)      BMI Readings from Last 1 Encounters:   04/15/24 28.88 kg/m²          Physical Exam  No acute distress alert and oriented  Respirations even unlabored   Abdomen is soft nontender     CT scan was independently reviewed.  Lab Results   Component Value Date    PSA 0.25 03/13/2024    PSA 0.20 02/01/2023    PSA 0.21 02/18/2021    PSA 0.31 11/07/2019    PSA 0.34 04/24/2018    PSA 0.39 05/20/2016    PSA 0.33 03/13/2014        Lab Results   Component Value Date    CREATININE 0.9 03/13/2024      Assessment:       1. Benign prostatic hyperplasia with urinary frequency    2. Hematuria, gross        Plan:     1. Benign prostatic hyperplasia with urinary frequency    2. Hematuria, gross       Orders Placed This Encounter    POCT Urinalysis, Dipstick, Automated, W/O Scope      BPH with history of gross hematuria.  I do suspect he likely had prostatitis due to decongestants and " antihistamines.  I did discuss with him that I recommended cystoscopy today for ruling out a bladder tumor.  Patient states he would like to forego the test unless he has a another episode of hematuria.  I will schedule follow up with the Ame in 4 months to re-evaluate his urine.  His urinalysis today was clear.  Continue tamsulosin for BPH and urinary urgency.

## 2024-04-29 DIAGNOSIS — E03.9 HYPOTHYROIDISM, UNSPECIFIED TYPE: ICD-10-CM

## 2024-04-29 DIAGNOSIS — I42.9 CARDIOMYOPATHY, UNSPECIFIED TYPE: ICD-10-CM

## 2024-04-30 RX ORDER — METOPROLOL SUCCINATE 50 MG/1
TABLET, EXTENDED RELEASE ORAL
Qty: 90 TABLET | Refills: 3 | Status: SHIPPED | OUTPATIENT
Start: 2024-04-30

## 2024-04-30 RX ORDER — LEVOTHYROXINE SODIUM 88 UG/1
TABLET ORAL
Qty: 90 TABLET | Refills: 1 | Status: SHIPPED | OUTPATIENT
Start: 2024-04-30

## 2024-04-30 NOTE — TELEPHONE ENCOUNTER
Refill Decision Note   Ezio James  is requesting a refill authorization.  Brief Assessment and Rationale for Refill:  Approve     Medication Therapy Plan:         Comments:     Note composed:6:23 PM 04/30/2024

## 2024-04-30 NOTE — TELEPHONE ENCOUNTER
No care due was identified.  Health [facility] Embedded Care Due Messages. Reference number: [id number].   4/29/2024 11:35:11 PM CDT

## 2024-05-09 PROBLEM — I70.0 AORTIC ATHEROSCLEROSIS: Status: ACTIVE | Noted: 2024-05-09

## 2024-05-09 NOTE — PROGRESS NOTES
Subjective:    Patient ID:  Ezio James is a 73 y.o. male who presents for evaluation of Valvular Heart Disease, Atrial Fibrillation, Hyperlipidemia, Hypertension, and Congestive Heart Failure        HPIPt presents for eval.   His current med conditions include  HTN, hyperlipidemia, CAD, LAE, MR, TR, PHTN, TR, CHF, LVH, non-ischemic cardiomyopathy, permanent a fib.   Nonsmoker.   Past hx pertinent for following:  He has declined anticoagulation multiple times in past (coumadin and NOAC discussed, see prior notes), wanted to stay on asa.   2014 Echo 40 - 45%.  Stress MPI 2014 EF 35%.  S/p [facility] 6/18 for abnl stress test and to more definitively assess LVEF.  Cath showed widely patent coronary arteries (only luminal irregularities) and LVEF 50 - 55%  Echo 7/21 EF 35%, LAE, mild MR/TR.  Echo 2/22 EF 45%, LAE, BLAZE, mod MR, mild TR.  Ecg 8/18/22 a fib with controlled VR, low precordial R waves.  Ecg 11/1/23 a fib w controlled VR, low R waves with possible old anterior infarct.  No acute changes.  Echo April 2023: LVEF 45%, DD, LAE, normal RVEF, mod MR, mild-mod TR, BLAZE, PAP 44 mmHg.  Now here for 6 month f/u appt.  No acute CV sxs.  HTN above goal today.  Not checking his BP at home.  No angina.  Has DENNISON, chronic and stable.  No pnd/orthopnea.  No syncope.  Minimal leg edema.  OTC beets chews has helped edema.  Weight stable.   Compliant w meds.  No TIA/CVA sxs.          Past Medical History:   Diagnosis Date    Atrial fibrillation, chronic     Chronic systolic congestive heart failure 5/4/2018    Colon polyp 04,15    Essential hypertension 6/14/2017    Pulmonary hypertension 11/1/2023    Subclinical hypothyroidism      Current Outpatient Medications   Medication Instructions    aspirin (ECOTRIN) 81 mg, Oral, Daily    cholecalciferol (vitamin D3) 2,000 Units, Oral, Daily    cyanocobalamin (VITAMIN B-12) 1,000 mcg, Oral, Daily    levothyroxine (SYNTHROID) 88 MCG tablet TAKE 1 TABLET BEFORE BREAKFAST    lisinopriL 10 mg,  "Oral, Daily    metoprolol succinate (TOPROL-XL) 50 MG 24 hr tablet TAKE 1 TABLET DAILY    multivitamin capsule 1 capsule, Oral, Daily    primidone (MYSOLINE) 50 mg, Oral, 2 times daily    rosuvastatin (CRESTOR) 10 mg, Oral, Nightly    tamsulosin (FLOMAX) 0.4 mg, Oral, Daily       Review of Systems   Constitutional: Positive for malaise/fatigue.   HENT: Negative.     Eyes: Negative.    Cardiovascular:  Positive for dyspnea on exertion.   Respiratory:  Positive for shortness of breath and snoring.    Endocrine: Negative.    Hematologic/Lymphatic: Negative.    Skin: Negative.    Musculoskeletal:  Positive for arthritis and joint pain.   Gastrointestinal: Negative.    Genitourinary: Negative.    Neurological: Negative.    Psychiatric/Behavioral: Negative.     Allergic/Immunologic: Negative.           BP (!) 140/90 (BP Location: Left arm, Patient Position: Sitting, BP Method: Large (Manual))   Pulse 88   Resp 16   Ht 5' 10" (1.778 m)   Wt 91.5 kg (201 lb 11.5 oz)   SpO2 97%   BMI 28.94 kg/m²     Wt Readings from Last 3 Encounters:   05/10/24 91.5 kg (201 lb 11.5 oz)   04/15/24 91.3 kg (201 lb 4.5 oz)   03/13/24 89.8 kg (197 lb 15.6 oz)     Temp Readings from Last 3 Encounters:   09/07/23 96 °F (35.6 °C) (Tympanic)   06/06/23 98.3 °F (36.8 °C) (Temporal)   03/13/23 98 °F (36.7 °C)     BP Readings from Last 3 Encounters:   05/10/24 (!) 140/90   04/15/24 (!) 141/93   03/13/24 (!) 143/93     Pulse Readings from Last 3 Encounters:   05/10/24 88   04/15/24 93   03/13/24 80         Objective:    Physical Exam  Vitals and nursing note reviewed.   Constitutional:       Appearance: He is well-developed.   HENT:      Head: Normocephalic.   Neck:      Thyroid: No thyromegaly.      Vascular: No carotid bruit or JVD.   Cardiovascular:      Rate and Rhythm: Normal rate. Rhythm irregularly irregular.      Pulses:           Radial pulses are 2+ on the right side and 2+ on the left side.      Heart sounds: S1 normal and S2 normal. " "Heart sounds not distant. No midsystolic click and no opening snap. No murmur heard.     No friction rub. No S3 or S4 sounds.   Pulmonary:      Effort: Pulmonary effort is normal.      Breath sounds: Normal breath sounds. No wheezing or rales.   Abdominal:      General: Bowel sounds are normal. There is no distension or abdominal bruit.      Palpations: Abdomen is soft. There is no mass.      Tenderness: There is no abdominal tenderness.   Musculoskeletal:      Cervical back: Neck supple.      Right lower leg: Edema (trace) present.      Left lower leg: Edema (trace) present.   Skin:     General: Skin is warm.   Neurological:      Mental Status: He is alert and oriented to person, place, and time.   Psychiatric:         Behavior: Behavior normal.         I have reviewed all pertinent labs and cardiac studies.        Component      Latest Ref Rng 3/29/2023   BNP      0 - 99 pg/mL 68          Chemistry        Component Value Date/Time     09/07/2023 0738    K 4.6 09/07/2023 0738     09/07/2023 0738    CO2 24 09/07/2023 0738    BUN 14 09/07/2023 0738    CREATININE 0.9 03/13/2024 1105    GLU 88 09/07/2023 0738        Component Value Date/Time    CALCIUM 9.0 09/07/2023 0738    ALKPHOS 50 (L) 09/07/2023 0738    AST 20 09/07/2023 0738    ALT 11 09/07/2023 0738    BILITOT 0.7 09/07/2023 0738    ESTGFRAFRICA >60.0 02/01/2022 1020    EGFRNONAA >60.0 02/01/2022 1020        Lab Results   Component Value Date    WBC 5.98 09/07/2023    HGB 14.5 09/07/2023    HCT 44.0 09/07/2023    MCV 99 (H) 09/07/2023     09/07/2023       No results found for: "LABA1C", "HGBA1C"  Lab Results   Component Value Date    CHOL 130 02/01/2023    CHOL 124 02/01/2022    CHOL 120 02/18/2021     Lab Results   Component Value Date    HDL 42 02/01/2023    HDL 40 02/01/2022    HDL 41 02/18/2021     Lab Results   Component Value Date    LDLCALC 67.6 02/01/2023    LDLCALC 61.0 (L) 02/01/2022    LDLCALC 58.6 (L) 02/18/2021     Lab Results "   Component Value Date    TRIG 102 02/01/2023    TRIG 115 02/01/2022    TRIG 102 02/18/2021     Lab Results   Component Value Date    CHOLHDL 32.3 02/01/2023    CHOLHDL 32.3 02/01/2022    CHOLHDL 34.2 02/18/2021           Results for orders placed during the hospital encounter of 04/21/23    Echo    Interpretation Summary  · The left ventricle is moderately enlarged with concentric hypertrophy and mildly decreased systolic function.  · Severe left atrial enlargement.  · The estimated ejection fraction is 45%.  · Left ventricular diastolic dysfunction.  · There is abnormal septal wall motion. There is systolic flattening of the interventricular septum consistent with right ventricle pressure overload.  · Normal right ventricular size with normal right ventricular systolic function.  · Severe right atrial enlargement.  · Moderate mitral regurgitation.  · Mild to moderate tricuspid regurgitation.  · Mild pulmonic regurgitation.  · Elevated central venous pressure (15 mmHg).  · The estimated PA systolic pressure is 44 mmHg.  · There is pulmonary hypertension.            Assessment:       1. Atrial fibrillation, chronic    2. Aortic atherosclerosis    3. Pulmonary hypertension    4. Overweight (BMI 25.0-29.9)    5. Non-ischemic cardiomyopathy    6. Nonrheumatic mitral valve regurgitation    7. LVH (left ventricular hypertrophy)    8. Low HDL (under 40)    9. LAE (left atrial enlargement)    10. Essential hypertension    11. Dyslipidemia    12. Abnormal ECG    13. Chronic combined systolic and diastolic congestive heart failure           Plan:             Stable cardiovascular conditions at present time on current medical treatment.  Reviewed all tests and above medical conditions with patient in detail and formulated treatment plan.  Continue optimal medical treatment for cardiovascular conditions.  Nonischemic cardiomyopathy: stable. Med tx.   Leg edema: low salt, compression stockings prn, elevate legs.   Chronic a  fib: rate controlled. Continue asa (has declined full anticoagulation in past on multiple discussions). Monitor.  Have discussed indications for DOAC for a fib numerous times, risks/benefits and indicated over asa qd.  He has declined DOAC and preferred asa tx.   Cardiac low salt diet advised.  PHTN: Mild on April 2023 echo.    May be from TREE -- offered Pulmonary consult in past.  Daily exercise encouraged, with the goal 30 +  minutes aerobic exercise as tolerated.  Maintaining healthy weight and weight loss goals (if needed) were discussed in clinic.  HTN: Stable. Need for BP control and HTN goals (if needed) were discussed and tx plan formulated.  Goal < 130/80.  Continue current HTN meds.   Needs to check BP regularly at home.  If BP runs above goal, he is advised to double up Lisinopril dose to 20 mg qd.  Hyperlipidemia:  Importance of optimal lipid control were discussed in detail as well as possible pharmacologic and lifestyle changes that may be needed.  Continue statin tx.   Valvular heart disease: monitor.  F/u echo in future.      F/u in 6 months.      I have reviewed all pertinent labs and cardiac studies independently. Plans and recommendations have been formulated under my direct supervision. All questions answered and patient voiced understanding.

## 2024-05-10 ENCOUNTER — OFFICE VISIT (OUTPATIENT)
Dept: CARDIOLOGY | Facility: CLINIC | Age: 74
End: 2024-05-10
Payer: MEDICARE

## 2024-05-10 VITALS
SYSTOLIC BLOOD PRESSURE: 140 MMHG | HEIGHT: 70 IN | OXYGEN SATURATION: 97 % | BODY MASS INDEX: 28.88 KG/M2 | RESPIRATION RATE: 16 BRPM | DIASTOLIC BLOOD PRESSURE: 90 MMHG | WEIGHT: 201.75 LBS | HEART RATE: 88 BPM

## 2024-05-10 DIAGNOSIS — I48.20 ATRIAL FIBRILLATION, CHRONIC: Primary | ICD-10-CM

## 2024-05-10 DIAGNOSIS — E66.3 OVERWEIGHT (BMI 25.0-29.9): ICD-10-CM

## 2024-05-10 DIAGNOSIS — E78.6 LOW HDL (UNDER 40): ICD-10-CM

## 2024-05-10 DIAGNOSIS — I70.0 AORTIC ATHEROSCLEROSIS: ICD-10-CM

## 2024-05-10 DIAGNOSIS — I27.20 PULMONARY HYPERTENSION: ICD-10-CM

## 2024-05-10 DIAGNOSIS — E78.5 DYSLIPIDEMIA: ICD-10-CM

## 2024-05-10 DIAGNOSIS — I34.0 NONRHEUMATIC MITRAL VALVE REGURGITATION: ICD-10-CM

## 2024-05-10 DIAGNOSIS — I10 ESSENTIAL HYPERTENSION: ICD-10-CM

## 2024-05-10 DIAGNOSIS — I51.7 LAE (LEFT ATRIAL ENLARGEMENT): ICD-10-CM

## 2024-05-10 DIAGNOSIS — I50.42 CHRONIC COMBINED SYSTOLIC AND DIASTOLIC CONGESTIVE HEART FAILURE: ICD-10-CM

## 2024-05-10 DIAGNOSIS — I42.8 NON-ISCHEMIC CARDIOMYOPATHY: ICD-10-CM

## 2024-05-10 DIAGNOSIS — R94.31 ABNORMAL ECG: ICD-10-CM

## 2024-05-10 DIAGNOSIS — I51.7 LVH (LEFT VENTRICULAR HYPERTROPHY): ICD-10-CM

## 2024-05-10 PROCEDURE — 1159F MED LIST DOCD IN RCRD: CPT | Mod: CPTII,S$GLB,, | Performed by: INTERNAL MEDICINE

## 2024-05-10 PROCEDURE — 4010F ACE/ARB THERAPY RXD/TAKEN: CPT | Mod: CPTII,S$GLB,, | Performed by: INTERNAL MEDICINE

## 2024-05-10 PROCEDURE — 3080F DIAST BP >= 90 MM HG: CPT | Mod: CPTII,S$GLB,, | Performed by: INTERNAL MEDICINE

## 2024-05-10 PROCEDURE — 99999 PR PBB SHADOW E&M-EST. PATIENT-LVL III: CPT | Mod: PBBFAC,,, | Performed by: INTERNAL MEDICINE

## 2024-05-10 PROCEDURE — 3077F SYST BP >= 140 MM HG: CPT | Mod: CPTII,S$GLB,, | Performed by: INTERNAL MEDICINE

## 2024-05-10 PROCEDURE — 1126F AMNT PAIN NOTED NONE PRSNT: CPT | Mod: CPTII,S$GLB,, | Performed by: INTERNAL MEDICINE

## 2024-05-10 PROCEDURE — 1160F RVW MEDS BY RX/DR IN RCRD: CPT | Mod: CPTII,S$GLB,, | Performed by: INTERNAL MEDICINE

## 2024-05-10 PROCEDURE — 99214 OFFICE O/P EST MOD 30 MIN: CPT | Mod: S$GLB,,, | Performed by: INTERNAL MEDICINE

## 2024-05-24 ENCOUNTER — PATIENT MESSAGE (OUTPATIENT)
Dept: CARDIOLOGY | Facility: CLINIC | Age: 74
End: 2024-05-24
Payer: MEDICARE

## 2024-06-20 ENCOUNTER — OFFICE VISIT (OUTPATIENT)
Dept: INTERNAL MEDICINE | Facility: CLINIC | Age: 74
End: 2024-06-20
Payer: MEDICARE

## 2024-06-20 ENCOUNTER — LAB VISIT (OUTPATIENT)
Dept: LAB | Facility: HOSPITAL | Age: 74
End: 2024-06-20
Attending: FAMILY MEDICINE
Payer: MEDICARE

## 2024-06-20 VITALS
OXYGEN SATURATION: 99 % | DIASTOLIC BLOOD PRESSURE: 84 MMHG | BODY MASS INDEX: 28.41 KG/M2 | TEMPERATURE: 98 F | WEIGHT: 198.44 LBS | HEIGHT: 70 IN | SYSTOLIC BLOOD PRESSURE: 136 MMHG | HEART RATE: 82 BPM

## 2024-06-20 DIAGNOSIS — I10 ESSENTIAL HYPERTENSION: Primary | ICD-10-CM

## 2024-06-20 DIAGNOSIS — E03.9 HYPOTHYROIDISM, UNSPECIFIED TYPE: ICD-10-CM

## 2024-06-20 DIAGNOSIS — R25.1 TREMOR: ICD-10-CM

## 2024-06-20 DIAGNOSIS — I48.20 ATRIAL FIBRILLATION, CHRONIC: ICD-10-CM

## 2024-06-20 DIAGNOSIS — R53.83 FATIGUE, UNSPECIFIED TYPE: ICD-10-CM

## 2024-06-20 LAB
T4 FREE SERPL-MCNC: 0.92 NG/DL (ref 0.71–1.51)
TSH SERPL DL<=0.005 MIU/L-ACNC: 2.08 UIU/ML (ref 0.4–4)

## 2024-06-20 PROCEDURE — 3008F BODY MASS INDEX DOCD: CPT | Mod: CPTII,S$GLB,, | Performed by: FAMILY MEDICINE

## 2024-06-20 PROCEDURE — 3079F DIAST BP 80-89 MM HG: CPT | Mod: CPTII,S$GLB,, | Performed by: FAMILY MEDICINE

## 2024-06-20 PROCEDURE — 1101F PT FALLS ASSESS-DOCD LE1/YR: CPT | Mod: CPTII,S$GLB,, | Performed by: FAMILY MEDICINE

## 2024-06-20 PROCEDURE — 1126F AMNT PAIN NOTED NONE PRSNT: CPT | Mod: CPTII,S$GLB,, | Performed by: FAMILY MEDICINE

## 2024-06-20 PROCEDURE — 84443 ASSAY THYROID STIM HORMONE: CPT | Performed by: FAMILY MEDICINE

## 2024-06-20 PROCEDURE — 99214 OFFICE O/P EST MOD 30 MIN: CPT | Mod: S$GLB,,, | Performed by: FAMILY MEDICINE

## 2024-06-20 PROCEDURE — 36415 COLL VENOUS BLD VENIPUNCTURE: CPT | Performed by: FAMILY MEDICINE

## 2024-06-20 PROCEDURE — 99999 PR PBB SHADOW E&M-EST. PATIENT-LVL III: CPT | Mod: PBBFAC,,, | Performed by: FAMILY MEDICINE

## 2024-06-20 PROCEDURE — 4010F ACE/ARB THERAPY RXD/TAKEN: CPT | Mod: CPTII,S$GLB,, | Performed by: FAMILY MEDICINE

## 2024-06-20 PROCEDURE — 3288F FALL RISK ASSESSMENT DOCD: CPT | Mod: CPTII,S$GLB,, | Performed by: FAMILY MEDICINE

## 2024-06-20 PROCEDURE — 3075F SYST BP GE 130 - 139MM HG: CPT | Mod: CPTII,S$GLB,, | Performed by: FAMILY MEDICINE

## 2024-06-20 PROCEDURE — 84439 ASSAY OF FREE THYROXINE: CPT | Performed by: FAMILY MEDICINE

## 2024-06-20 RX ORDER — LISINOPRIL 10 MG/1
20 TABLET ORAL DAILY
Start: 2024-06-20

## 2024-06-20 NOTE — PROGRESS NOTES
Subjective:       Patient ID: Ezio James is a 73 y.o. male.    Chief Complaint: Other (Discuss thyroid)    Follow-up hypothyroidism hypertension atrial fib fatigue.  He is also followed by Cardiology.  Lisinopril was recently increased.  Also he is followed by neurology for tremor.  Primidone was recently increased.  Tremor has improved.  He does not feel that increase medications have increase his fatigue.  He denies headache chest pain palpitations shortness breath or edema.  BPH is stable followed by urology.      Review of Systems   Constitutional:  Positive for fatigue. Negative for activity change, appetite change and unexpected weight change.   Respiratory:  Negative for cough, chest tightness, shortness of breath and wheezing.    Cardiovascular:  Negative for chest pain, palpitations and leg swelling.   Gastrointestinal:  Negative for abdominal distention and abdominal pain.   Neurological:  Negative for dizziness, weakness, light-headedness and headaches.       Objective:      Physical Exam  Constitutional:       General: He is not in acute distress.     Appearance: He is not ill-appearing or diaphoretic.   Cardiovascular:      Rate and Rhythm: Normal rate. Rhythm irregular.      Heart sounds: No murmur heard.     No gallop.   Pulmonary:      Effort: Pulmonary effort is normal. No respiratory distress.      Breath sounds: No wheezing, rhonchi or rales.   Abdominal:      General: There is no distension.   Lymphadenopathy:      Cervical: No cervical adenopathy.   Skin:     General: Skin is warm and dry.   Neurological:      Mental Status: He is alert.   Psychiatric:         Mood and Affect: Mood normal.         Behavior: Behavior normal.         Office Visit on 04/15/2024   Component Date Value Ref Range Status    POC Blood, Urine 04/15/2024 Negative  Negative Final    POC Bilirubin, Urine 04/15/2024 Negative  Negative Final    POC Urobilinogen, Urine 04/15/2024 0.2 (A)  0.3 - 2.2 Final    POC Ketones,  Urine 04/15/2024 Negative  Negative Final    POC Protein, Urine 04/15/2024 Negative  Negative Final    POC Nitrates, Urine 04/15/2024 Negative  Negative Final    POC Glucose, Urine 04/15/2024 Negative  Negative Final    pH, UA 04/15/2024 5.5   Final    POC Specific Gravity, Urine 04/15/2024 1.015  1.003 - 1.029 Final    POC Leukocytes, Urine 04/15/2024 Negative  Negative Final     Assessment:       1. Essential hypertension    2. Hypothyroidism, unspecified type    3. Atrial fibrillation, chronic    4. Fatigue, unspecified type    5. Tremor        Plan:   Blood pressure controlled.  Atrial fib with controlled rate.  TSH free T4 was ordered.  Currently on levothyroxine 88 mcg a day.  Discussed increased aerobic activity maintaining good hydration.  Follow-up in 6 months      Essential hypertension  -     lisinopriL 10 MG tablet; Take 2 tablets (20 mg total) by mouth once daily.    Hypothyroidism, unspecified type  -     Cancel: TSH; Future; Expected date: 06/20/2024  -     Cancel: T4, Free; Future; Expected date: 06/20/2024  -     TSH; Future; Expected date: 06/20/2024  -     T4, Free; Future; Expected date: 06/20/2024    Atrial fibrillation, chronic    Fatigue, unspecified type    Tremor

## 2024-08-15 ENCOUNTER — OFFICE VISIT (OUTPATIENT)
Dept: UROLOGY | Facility: CLINIC | Age: 74
End: 2024-08-15
Payer: MEDICARE

## 2024-08-15 VITALS
DIASTOLIC BLOOD PRESSURE: 94 MMHG | SYSTOLIC BLOOD PRESSURE: 135 MMHG | WEIGHT: 202.81 LBS | HEIGHT: 70 IN | HEART RATE: 86 BPM | BODY MASS INDEX: 29.03 KG/M2

## 2024-08-15 DIAGNOSIS — Z87.448 HISTORY OF URETHRAL STRICTURE: ICD-10-CM

## 2024-08-15 DIAGNOSIS — R31.0 HEMATURIA, GROSS: Primary | ICD-10-CM

## 2024-08-15 DIAGNOSIS — N40.1 BENIGN PROSTATIC HYPERPLASIA WITH URINARY FREQUENCY: ICD-10-CM

## 2024-08-15 DIAGNOSIS — R35.0 BENIGN PROSTATIC HYPERPLASIA WITH URINARY FREQUENCY: ICD-10-CM

## 2024-08-15 LAB
BILIRUB UR QL STRIP: NEGATIVE
GLUCOSE UR QL STRIP: NEGATIVE
KETONES UR QL STRIP: NEGATIVE
LEUKOCYTE ESTERASE UR QL STRIP: NEGATIVE
PH, POC UA: 5.5
POC BLOOD, URINE: NEGATIVE
POC NITRATES, URINE: NEGATIVE
PROT UR QL STRIP: NEGATIVE
SP GR UR STRIP: 1.03 (ref 1–1.03)
UROBILINOGEN UR STRIP-ACNC: 0.2 (ref 0.3–2.2)

## 2024-08-15 PROCEDURE — 3008F BODY MASS INDEX DOCD: CPT | Mod: CPTII,S$GLB,, | Performed by: UROLOGY

## 2024-08-15 PROCEDURE — 99214 OFFICE O/P EST MOD 30 MIN: CPT | Mod: S$GLB,,, | Performed by: UROLOGY

## 2024-08-15 PROCEDURE — 81003 URINALYSIS AUTO W/O SCOPE: CPT | Mod: QW,S$GLB,, | Performed by: UROLOGY

## 2024-08-15 PROCEDURE — 3288F FALL RISK ASSESSMENT DOCD: CPT | Mod: CPTII,S$GLB,, | Performed by: UROLOGY

## 2024-08-15 PROCEDURE — 3075F SYST BP GE 130 - 139MM HG: CPT | Mod: CPTII,S$GLB,, | Performed by: UROLOGY

## 2024-08-15 PROCEDURE — 99999 PR PBB SHADOW E&M-EST. PATIENT-LVL III: CPT | Mod: PBBFAC,,, | Performed by: UROLOGY

## 2024-08-15 PROCEDURE — 1159F MED LIST DOCD IN RCRD: CPT | Mod: CPTII,S$GLB,, | Performed by: UROLOGY

## 2024-08-15 PROCEDURE — 3080F DIAST BP >= 90 MM HG: CPT | Mod: CPTII,S$GLB,, | Performed by: UROLOGY

## 2024-08-15 PROCEDURE — 4010F ACE/ARB THERAPY RXD/TAKEN: CPT | Mod: CPTII,S$GLB,, | Performed by: UROLOGY

## 2024-08-15 PROCEDURE — 1101F PT FALLS ASSESS-DOCD LE1/YR: CPT | Mod: CPTII,S$GLB,, | Performed by: UROLOGY

## 2024-08-15 PROCEDURE — 1160F RVW MEDS BY RX/DR IN RCRD: CPT | Mod: CPTII,S$GLB,, | Performed by: UROLOGY

## 2024-08-15 PROCEDURE — 1126F AMNT PAIN NOTED NONE PRSNT: CPT | Mod: CPTII,S$GLB,, | Performed by: UROLOGY

## 2024-08-15 RX ORDER — TAMSULOSIN HYDROCHLORIDE 0.4 MG/1
0.4 CAPSULE ORAL DAILY
Qty: 90 CAPSULE | Refills: 3 | Status: SHIPPED | OUTPATIENT
Start: 2024-08-15 | End: 2025-08-10

## 2024-08-15 NOTE — PROGRESS NOTES
Chief Complaint:   Encounter Diagnoses   Name Primary?    Hematuria, gross Yes    Benign prostatic hyperplasia with urinary frequency     History of urethral stricture        HPI:  HPI Ezio James jordi 73 y.o. male who presents with follow up to BPH and hematuria.  Patient was had a cystoscopy in 2016 showing urethral stricture disease.  He had a recurrent episode of hematuria about 4 months ago.  He wished to forego a cystoscopy at that time.  He has not had any trouble urinating.  He was on Flomax for lower urinary tract symptoms.  He has not had any further hematuria.    History:  Social History     Tobacco Use    Smoking status: Never    Smokeless tobacco: Never   Substance Use Topics    Alcohol use: Yes     Comment: Rarely    Drug use: No     Past Medical History:   Diagnosis Date    Atrial fibrillation, chronic     Chronic systolic congestive heart failure 5/4/2018    Colon polyp 04,15    Essential hypertension 6/14/2017    Pulmonary hypertension 11/1/2023    Subclinical hypothyroidism      Past Surgical History:   Procedure Laterality Date    COLONOSCOPY  2004,5/15    with polyps per Chart    LEFT HEART CATHETERIZATION Left 6/21/2018    Procedure: HEART CATH-LEFT;  Surgeon: Leon James MD;  Location: [facility] CATH LAB;  Service: Cardiology;  Laterality: Left;  7:30 start time  right radial approach     Family History   Problem Relation Name Age of Onset    Lung cancer Mother      Hypertension Mother      Obesity Mother      Cataracts Mother      Emphysema Father      Heart disease Father         Current Outpatient Medications on File Prior to Visit   Medication Sig Dispense Refill    aspirin (ECOTRIN) 81 MG EC tablet Take 1 tablet (81 mg total) by mouth once daily. 90 tablet 2    cholecalciferol, vitamin D3, 125 mcg (5,000 unit) Tab Take 2,000 Units by mouth once daily.      cyanocobalamin (VITAMIN B-12) 1000 MCG tablet Take 1,000 mcg by mouth once daily.      levothyroxine (SYNTHROID) 88 MCG tablet TAKE 1  "TABLET BEFORE BREAKFAST 90 tablet 1    lisinopriL 10 MG tablet Take 2 tablets (20 mg total) by mouth once daily.      metoprolol succinate (TOPROL-XL) 50 MG 24 hr tablet TAKE 1 TABLET DAILY 90 tablet 3    multivitamin capsule Take 1 capsule by mouth once daily.      primidone (MYSOLINE) 50 MG Tab Take 1 tablet (50 mg total) by mouth 2 (two) times a day. 60 tablet 11    rosuvastatin (CRESTOR) 10 MG tablet TAKE 1 TABLET EVERY EVENING 90 tablet 3    [DISCONTINUED] tamsulosin (FLOMAX) 0.4 mg Cap Take 1 capsule (0.4 mg total) by mouth once daily. 90 capsule 3     No current facility-administered medications on file prior to visit.        Objective:     Vitals:    08/15/24 0938   BP: (!) 135/94   BP Location: Left arm   Patient Position: Sitting   Pulse: 86   Weight: 92 kg (202 lb 13.2 oz)   Height: 5' 10" (1.778 m)      BMI Readings from Last 1 Encounters:   08/15/24 29.10 kg/m²          Physical Exam  No acute distress alert and oriented   Respirations even unlabored   Abdomen is soft nontender  Lab Results   Component Value Date    PSA 0.25 03/13/2024    PSA 0.20 02/01/2023    PSA 0.21 02/18/2021    PSA 0.31 11/07/2019    PSA 0.34 04/24/2018    PSA 0.39 05/20/2016    PSA 0.33 03/13/2014        Lab Results   Component Value Date    CREATININE 0.9 03/13/2024      Assessment:       1. Hematuria, gross    2. Benign prostatic hyperplasia with urinary frequency    3. History of urethral stricture        Plan:     1. Hematuria, gross    2. Benign prostatic hyperplasia with urinary frequency    3. History of urethral stricture       Orders Placed This Encounter    POCT Urinalysis, Dipstick, Automated, W/O Scope    tamsulosin (FLOMAX) 0.4 mg Cap      BPH with a history of hematuria.  Urinalysis is clear today.  Reassured.  Plan digital rectal exam and PSA in March of next year.  Have refilled his Flomax.  "

## 2024-08-26 DIAGNOSIS — I10 ESSENTIAL HYPERTENSION: ICD-10-CM

## 2024-08-26 RX ORDER — LISINOPRIL 10 MG/1
20 TABLET ORAL DAILY
Qty: 180 TABLET | Refills: 3 | Status: SHIPPED | OUTPATIENT
Start: 2024-08-26

## 2024-10-19 ENCOUNTER — PATIENT MESSAGE (OUTPATIENT)
Dept: ADMINISTRATIVE | Facility: CLINIC | Age: 74
End: 2024-10-19
Payer: MEDICARE

## 2024-11-01 DIAGNOSIS — E03.9 HYPOTHYROIDISM, UNSPECIFIED TYPE: ICD-10-CM

## 2024-11-01 RX ORDER — LEVOTHYROXINE SODIUM 88 UG/1
TABLET ORAL
Qty: 90 TABLET | Refills: 2 | Status: SHIPPED | OUTPATIENT
Start: 2024-11-01

## 2024-11-06 DIAGNOSIS — I42.8 OTHER CARDIOMYOPATHY: ICD-10-CM

## 2024-11-06 DIAGNOSIS — I48.20 ATRIAL FIBRILLATION, CHRONIC: Primary | ICD-10-CM

## 2024-11-18 ENCOUNTER — HOSPITAL ENCOUNTER (OUTPATIENT)
Dept: RADIOLOGY | Facility: HOSPITAL | Age: 74
Discharge: HOME | End: 2024-11-18
Attending: PHYSICIAN ASSISTANT
Payer: MEDICARE

## 2024-11-18 ENCOUNTER — NURSE TRIAGE (OUTPATIENT)
Dept: ADMINISTRATIVE | Facility: CLINIC | Age: 74
End: 2024-11-18
Payer: MEDICARE

## 2024-11-18 ENCOUNTER — OFFICE VISIT (OUTPATIENT)
Dept: INTERNAL MEDICINE | Facility: CLINIC | Age: 74
End: 2024-11-18
Payer: MEDICARE

## 2024-11-18 VITALS
SYSTOLIC BLOOD PRESSURE: 138 MMHG | WEIGHT: 199.06 LBS | HEIGHT: 70 IN | BODY MASS INDEX: 28.5 KG/M2 | DIASTOLIC BLOOD PRESSURE: 88 MMHG | HEART RATE: 80 BPM | OXYGEN SATURATION: 96 %
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
## 2024-11-18 DIAGNOSIS — M54.2 NECK PAIN: ICD-10-CM

## 2024-11-18 PROCEDURE — 71100 X-RAY EXAM RIBS UNI 2 VIEWS: CPT | Mod: TC,RT

## 2024-11-18 PROCEDURE — 72070 X-RAY EXAM THORAC SPINE 2VWS: CPT | Mod: TC

## 2024-11-18 PROCEDURE — 72040 X-RAY EXAM NECK SPINE 2-3 VW: CPT | Mod: TC

## 2024-11-18 PROCEDURE — 99999 PR PBB SHADOW E&M-EST. PATIENT-LVL V: CPT | Mod: PBBFAC,,, | Performed by: PHYSICIAN ASSISTANT

## 2024-11-18 PROCEDURE — 72100 X-RAY EXAM L-S SPINE 2/3 VWS: CPT | Mod: TC

## 2024-11-18 RX ORDER — TIZANIDINE 2 MG/1
2 TABLET ORAL NIGHTLY PRN
Qty: 5 TABLET | Refills: 0 | Status: SHIPPED | OUTPATIENT
Start: 2024-11-18 | End: 2024-11-23
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
## 2024-12-06 ENCOUNTER — HOSPITAL ENCOUNTER (OUTPATIENT)
Dept: CARDIOLOGY | Facility: HOSPITAL | Age: 74
Discharge: HOME | End: 2024-12-06
Attending: INTERNAL MEDICINE
Payer: MEDICARE

## 2024-12-06 ENCOUNTER — OFFICE VISIT (OUTPATIENT)
Dept: CARDIOLOGY | Facility: CLINIC | Age: 74
End: 2024-12-06
Payer: MEDICARE

## 2024-12-06 VITALS
WEIGHT: 200.19 LBS | OXYGEN SATURATION: 99 % | SYSTOLIC BLOOD PRESSURE: 124 MMHG | DIASTOLIC BLOOD PRESSURE: 82 MMHG | BODY MASS INDEX: 28.72 KG/M2 | HEART RATE: 65 BPM

## 2024-12-06 DIAGNOSIS — I48.20 ATRIAL FIBRILLATION, CHRONIC: ICD-10-CM

## 2024-12-06 DIAGNOSIS — I51.7 LAE (LEFT ATRIAL ENLARGEMENT): ICD-10-CM

## 2024-12-06 DIAGNOSIS — I42.8 NON-ISCHEMIC CARDIOMYOPATHY: ICD-10-CM

## 2024-12-06 DIAGNOSIS — I50.42 CHRONIC COMBINED SYSTOLIC AND DIASTOLIC CONGESTIVE HEART FAILURE: Primary | ICD-10-CM

## 2024-12-06 DIAGNOSIS — I34.0 NONRHEUMATIC MITRAL VALVE REGURGITATION: ICD-10-CM

## 2024-12-06 DIAGNOSIS — E78.6 LOW HDL (UNDER 40): ICD-10-CM

## 2024-12-06 DIAGNOSIS — I42.8 OTHER CARDIOMYOPATHY: ICD-10-CM

## 2024-12-06 DIAGNOSIS — I51.7 LVH (LEFT VENTRICULAR HYPERTROPHY): ICD-10-CM

## 2024-12-06 DIAGNOSIS — E66.3 OVERWEIGHT (BMI 25.0-29.9): ICD-10-CM

## 2024-12-06 DIAGNOSIS — I27.20 PULMONARY HYPERTENSION: ICD-10-CM

## 2024-12-06 DIAGNOSIS — I10 ESSENTIAL HYPERTENSION: ICD-10-CM

## 2024-12-06 DIAGNOSIS — R06.09 DOE (DYSPNEA ON EXERTION): ICD-10-CM

## 2024-12-06 DIAGNOSIS — I70.0 AORTIC ATHEROSCLEROSIS: ICD-10-CM

## 2024-12-06 DIAGNOSIS — R94.31 ABNORMAL ECG: ICD-10-CM

## 2024-12-06 DIAGNOSIS — E78.5 DYSLIPIDEMIA: ICD-10-CM

## 2024-12-06 LAB
OHS QRS DURATION: 106 MS
OHS QTC CALCULATION: 434 MS

## 2024-12-06 PROCEDURE — 93010 ELECTROCARDIOGRAM REPORT: CPT | Mod: ,,, | Performed by: INTERNAL MEDICINE

## 2024-12-06 PROCEDURE — 93005 ELECTROCARDIOGRAM TRACING: CPT

## 2024-12-06 PROCEDURE — 99999 PR PBB SHADOW E&M-EST. PATIENT-LVL III: CPT | Mod: PBBFAC,,, | Performed by: INTERNAL MEDICINE

## 2024-12-06 RX ORDER — LISINOPRIL 20 MG/1
20 TABLET ORAL DAILY
Qty: 90 TABLET | Refills: 3 | Status: SHIPPED | OUTPATIENT
Start: 2024-12-06 | End: 2025-12-06

## 2024-12-06 NOTE — PROGRESS NOTES
Subjective:    Patient ID:  Ezio James is a 73 y.o. male who presents for evaluation of Atrial Fibrillation, Hypertension, Hyperlipidemia, and Congestive Heart Failure        HPIPt presents for eval.   His current med conditions include  HTN, hyperlipidemia, CAD, LAE, MR, TR, PHTN, TR, CHF, LVH, non-ischemic cardiomyopathy, permanent a fib.   Nonsmoker.   Past hx pertinent for following:  He has declined anticoagulation multiple times in past (coumadin and NOAC discussed, see prior notes), wanted to stay on asa.   2014 Echo 40 - 45%.  Stress MPI 2014 EF 35%.  S/p [facility] 6/18 for abnl stress test and to more definitively assess LVEF.  Cath showed widely patent coronary arteries (only luminal irregularities) and LVEF 50 - 55%  Echo 7/21 EF 35%, LAE, mild MR/TR.  Echo 2/22 EF 45%, LAE, BLAZE, mod MR, mild TR.  Ecg 8/18/22 a fib with controlled VR, low precordial R waves.  Ecg 11/1/23 a fib w controlled VR, low R waves with possible old anterior infarct.  No acute changes.  Echo April 2023: LVEF 45%, DD, LAE, normal RV EF, mod MR, mild-mod TR, BLAZE, PAP 44 mmHg.  Now here for 6 month f/u appt.  No acute CV sxs.  Ecg today 12/6/24 personally reviewed: a fib w controlled VR, possible old anterolateral infarct.  No acute changes noted.  No angina/CP sxs.  Has chronic DENNISON, stable.  No syncope.  BP controlled.  Compliant w meds.  Labs reviewed.  Minimal leg edema.  OTC beets chews has helped edema.  Weight stable.   Compliant w meds.  No TIA/CVA sxs.          Past Medical History:   Diagnosis Date    Atrial fibrillation, chronic     Chronic systolic congestive heart failure 5/4/2018    Colon polyp 04,15    Essential hypertension 6/14/2017    Pulmonary hypertension 11/1/2023    Subclinical hypothyroidism      Current Outpatient Medications   Medication Instructions    aspirin (ECOTRIN) 81 mg, Oral, Daily    cholecalciferol (vitamin D3) 2,000 Units, Daily    cyanocobalamin (VITAMIN B-12) 1,000 mcg, Daily    levothyroxine  (SYNTHROID) 88 MCG tablet TAKE 1 TABLET BEFORE BREAKFAST    lisinopriL (PRINIVIL,ZESTRIL) 20 mg, Oral, Daily    metoprolol succinate (TOPROL-XL) 50 MG 24 hr tablet TAKE 1 TABLET DAILY    multivitamin capsule 1 capsule, Daily    primidone (MYSOLINE) 50 mg, Oral, 2 times daily    rosuvastatin (CRESTOR) 10 mg, Oral, Nightly    tamsulosin (FLOMAX) 0.4 mg, Oral, Daily       Review of Systems   Constitutional: Positive for malaise/fatigue.   HENT: Negative.     Eyes: Negative.    Cardiovascular:  Positive for dyspnea on exertion and leg swelling.   Respiratory:  Positive for shortness of breath and snoring.    Endocrine: Negative.    Hematologic/Lymphatic: Negative.    Skin: Negative.    Musculoskeletal:  Positive for arthritis and joint pain.   Gastrointestinal: Negative.    Genitourinary: Negative.    Neurological: Negative.    Psychiatric/Behavioral: Negative.     Allergic/Immunologic: Negative.           /82 (BP Location: Left arm, Patient Position: Sitting)   Pulse 65   Wt 90.8 kg (200 lb 2.8 oz)   SpO2 99%   BMI 28.72 kg/m²     Wt Readings from Last 3 Encounters:   12/06/24 90.8 kg (200 lb 2.8 oz)   11/18/24 90.3 kg (199 lb 1.2 oz)   08/15/24 92 kg (202 lb 13.2 oz)     Temp Readings from Last 3 Encounters:   06/20/24 98.2 °F (36.8 °C) (Temporal)   09/07/23 96 °F (35.6 °C) (Tympanic)   06/06/23 98.3 °F (36.8 °C) (Temporal)     BP Readings from Last 3 Encounters:   12/06/24 124/82   11/18/24 138/88   08/15/24 (!) 135/94     Pulse Readings from Last 3 Encounters:   12/06/24 65   11/18/24 80   08/15/24 86         Objective:    Physical Exam  Vitals and nursing note reviewed.   Constitutional:       Appearance: He is well-developed.   HENT:      Head: Normocephalic.   Neck:      Thyroid: No thyromegaly.      Vascular: No carotid bruit or JVD.   Cardiovascular:      Rate and Rhythm: Normal rate. Rhythm irregularly irregular.      Pulses:           Radial pulses are 2+ on the right side and 2+ on the left side.  "     Heart sounds: S1 normal and S2 normal. Heart sounds not distant. No midsystolic click and no opening snap. No murmur heard.     No friction rub. No S3 or S4 sounds.   Pulmonary:      Effort: Pulmonary effort is normal.      Breath sounds: Normal breath sounds. No wheezing or rales.   Abdominal:      General: Bowel sounds are normal. There is no distension or abdominal bruit.      Palpations: Abdomen is soft. There is no mass.      Tenderness: There is no abdominal tenderness.   Musculoskeletal:      Cervical back: Neck supple.      Right lower leg: Edema (trace) present.      Left lower leg: Edema (trace) present.   Skin:     General: Skin is warm.   Neurological:      Mental Status: He is alert and oriented to person, place, and time.   Psychiatric:         Behavior: Behavior normal.         I have reviewed all pertinent labs and cardiac studies.        Component      Latest Ref Rng 3/29/2023   BNP      0 - 99 pg/mL 68          Chemistry        Component Value Date/Time     09/07/2023 0738    K 4.6 09/07/2023 0738     09/07/2023 0738    CO2 24 09/07/2023 0738    BUN 14 09/07/2023 0738    CREATININE 0.9 03/13/2024 1105    GLU 88 09/07/2023 0738        Component Value Date/Time    CALCIUM 9.0 09/07/2023 0738    ALKPHOS 50 (L) 09/07/2023 0738    AST 20 09/07/2023 0738    ALT 11 09/07/2023 0738    BILITOT 0.7 09/07/2023 0738    ESTGFRAFRICA >60.0 02/01/2022 1020    EGFRNONAA >60.0 02/01/2022 1020        Lab Results   Component Value Date    WBC 5.98 09/07/2023    HGB 14.5 09/07/2023    HCT 44.0 09/07/2023    MCV 99 (H) 09/07/2023     09/07/2023       No results found for: "LABA1C", "HGBA1C"  Lab Results   Component Value Date    CHOL 130 02/01/2023    CHOL 124 02/01/2022    CHOL 120 02/18/2021     Lab Results   Component Value Date    HDL 42 02/01/2023    HDL 40 02/01/2022    HDL 41 02/18/2021     Lab Results   Component Value Date    LDLCALC 67.6 02/01/2023    LDLCALC 61.0 (L) 02/01/2022    " LDLCALC 58.6 (L) 02/18/2021     Lab Results   Component Value Date    TRIG 102 02/01/2023    TRIG 115 02/01/2022    TRIG 102 02/18/2021     Lab Results   Component Value Date    CHOLHDL 32.3 02/01/2023    CHOLHDL 32.3 02/01/2022    CHOLHDL 34.2 02/18/2021           Results for orders placed during the hospital encounter of 04/21/23    Echo    Interpretation Summary  · The left ventricle is moderately enlarged with concentric hypertrophy and mildly decreased systolic function.  · Severe left atrial enlargement.  · The estimated ejection fraction is 45%.  · Left ventricular diastolic dysfunction.  · There is abnormal septal wall motion. There is systolic flattening of the interventricular septum consistent with right ventricle pressure overload.  · Normal right ventricular size with normal right ventricular systolic function.  · Severe right atrial enlargement.  · Moderate mitral regurgitation.  · Mild to moderate tricuspid regurgitation.  · Mild pulmonic regurgitation.  · Elevated central venous pressure (15 mmHg).  · The estimated PA systolic pressure is 44 mmHg.  · There is pulmonary hypertension.            Assessment:       1. Chronic combined systolic and diastolic congestive heart failure    2. Aortic atherosclerosis    3. Abnormal ECG    4. Atrial fibrillation, chronic    5. Low HDL (under 40)    6. LAE (left atrial enlargement)    7. Essential hypertension    8. Dyslipidemia    9. Pulmonary hypertension    10. Overweight (BMI 25.0-29.9)    11. Nonrheumatic mitral valve regurgitation    12. Non-ischemic cardiomyopathy    13. LVH (left ventricular hypertrophy)    14. DENNISON (dyspnea on exertion)           Plan:             Stable cardiovascular conditions at present time on current medical treatment.  Reviewed all tests and above medical conditions with patient in detail and formulated treatment plan.  Continue optimal medical treatment for cardiovascular conditions.  Nonischemic cardiomyopathy: stable. Med tx.    Leg edema: low salt, compression stockings prn, elevate legs.   Chronic a fib: rate controlled. Continue asa (has declined full anticoagulation in past on multiple discussions). Monitor.  Have discussed indications for DOAC for a fib numerous times, risks/benefits and indicated over asa qd.  He has declined DOAC and preferred asa tx.   Cardiac low salt diet advised.  PHTN: Mild on April 2023 echo.    May be from [facility] -- offered Pulmonary consult in past.  Daily exercise encouraged, with the goal 30 +  minutes aerobic exercise as tolerated.  Maintaining healthy weight and weight loss goals (if needed) were discussed in clinic.  HTN: Stable. Need for BP control and HTN goals (if needed) were discussed and tx plan formulated.  Goal < 130/80.  Continue current HTN meds.   Home BP monitoring encouraged.  Hyperlipidemia:  Importance of optimal lipid control were discussed in detail as well as possible pharmacologic and lifestyle changes that may be needed.  Continue statin tx.   Valvular heart disease: monitor.  F/u echo in future.      F/u in 6 months w echocardiogram.      I have reviewed all pertinent labs and cardiac studies independently. Plans and recommendations have been formulated under my direct supervision. All questions answered and patient voiced understanding.

## 2025-01-01 DIAGNOSIS — G25.0 BENIGN ESSENTIAL TREMOR: ICD-10-CM

## 2025-01-02 ENCOUNTER — TELEPHONE (OUTPATIENT)
Dept: NEUROLOGY | Facility: CLINIC | Age: 75
End: 2025-01-02
Payer: MEDICARE

## 2025-01-02 DIAGNOSIS — G25.0 BENIGN ESSENTIAL TREMOR: ICD-10-CM

## 2025-01-02 RX ORDER — PRIMIDONE 50 MG/1
50 TABLET ORAL 2 TIMES DAILY
Qty: 180 TABLET | Refills: 3 | Status: SHIPPED | OUTPATIENT
Start: 2025-01-02 | End: 2026-01-02

## 2025-01-02 RX ORDER — PRIMIDONE 50 MG/1
50 TABLET ORAL 2 TIMES DAILY
Qty: 60 TABLET | Refills: 11 | Status: SHIPPED | OUTPATIENT
Start: 2025-01-02 | End: 2025-01-02 | Stop reason: SDUPTHER

## 2025-01-02 NOTE — TELEPHONE ENCOUNTER
----- Message from [employer] sent at 1/2/2025 10:49 AM CST -----  Contact: BABAK GOODWIN [[id number]]  .Type:  RX Refill Request    Who Called: BABAK GOODWIN [[id number]]  Refill or New Rx:Refill  RX Name and Strength:primidone (MYSOLINE) 50 MG Tab  Is this a 30 day or 90 day RX:30  Preferred Pharmacy with phone number:.  [facility] - [address] [address]  Phone: [phone] Fax: [phone]  Local or Mail Order:Mail  Ordering Provider:Shaye Maddox  Would the patient rather a call back or a response via [facility]? Call  Best Call Back Number:.[phone] (home)   Patient would prefer 90 days if possible

## 2025-01-30 DIAGNOSIS — Z00.00 ENCOUNTER FOR MEDICARE ANNUAL WELLNESS EXAM: ICD-10-CM

## 2025-02-06 ENCOUNTER — TELEPHONE (OUTPATIENT)
Dept: CARDIOLOGY | Facility: CLINIC | Age: 75
End: 2025-02-06
Payer: MEDICARE

## 2025-02-06 DIAGNOSIS — I10 ESSENTIAL HYPERTENSION: ICD-10-CM

## 2025-02-06 NOTE — TELEPHONE ENCOUNTER
Please advise    PT stated he accidentally put the wrong dosage of his medication he and was taking 2 of the 20 mg for about 10 days and wanted to know if he needs to stop taking the correct dosage or keep with current dosage PT stated no adverse affects except he was tired BP and HR normal.        ----- Message from [employer] sent at 2/6/2025 12:53 PM CST -----  Contact: [phone]  Patient is returning a phone call.  Who left a message for the patient: Nikki  Does patient know what this is regarding:    Would you like a call back, or a response through your [facility] portal?:   call back   Comments:        Pt missed a call

## 2025-02-06 NOTE — TELEPHONE ENCOUNTER
The chart states Lisinopril 20 mg daily so I am not sure why there is any confusion with pt or staff.    Please call pt and sort it out.      Thanks    Dr James

## 2025-02-06 NOTE — TELEPHONE ENCOUNTER
VM to call the clinic to review what PT needs    ----- Message from [employer] sent at 2/6/2025 12:03 PM CST -----  Contact: BABAK GOODWIN [[id number]]  .Type:  Patient Requesting Call    Who Called:BABAK GOODWIN [[id number]]  Does the patient know what this is regarding?:increase of dosage meds.. lisinopriL (PRINIVIL,ZESTRIL) 20 MG tablet  Would the patient rather a call back or a response via [facility]? call  Best Call Back Number:.[phone] (home)     Additional Information:

## 2025-03-11 ENCOUNTER — LAB VISIT (OUTPATIENT)
Dept: LAB | Facility: HOSPITAL | Age: 75
End: 2025-03-11
Attending: FAMILY MEDICINE
Payer: MEDICARE

## 2025-03-11 ENCOUNTER — OFFICE VISIT (OUTPATIENT)
Dept: INTERNAL MEDICINE | Facility: CLINIC | Age: 75
End: 2025-03-11
Payer: MEDICARE

## 2025-03-11 VITALS
BODY MASS INDEX: 28.35 KG/M2 | OXYGEN SATURATION: 96 % | HEART RATE: 106 BPM | WEIGHT: 198 LBS | SYSTOLIC BLOOD PRESSURE: 140 MMHG | DIASTOLIC BLOOD PRESSURE: 86 MMHG | HEIGHT: 70 IN

## 2025-03-11 DIAGNOSIS — I50.42 CHRONIC COMBINED SYSTOLIC AND DIASTOLIC CONGESTIVE HEART FAILURE: ICD-10-CM

## 2025-03-11 DIAGNOSIS — G25.0 BENIGN ESSENTIAL TREMOR: ICD-10-CM

## 2025-03-11 DIAGNOSIS — I42.8 NON-ISCHEMIC CARDIOMYOPATHY: ICD-10-CM

## 2025-03-11 DIAGNOSIS — E03.9 HYPOTHYROIDISM, UNSPECIFIED TYPE: ICD-10-CM

## 2025-03-11 DIAGNOSIS — E78.5 DYSLIPIDEMIA: ICD-10-CM

## 2025-03-11 DIAGNOSIS — I10 ESSENTIAL HYPERTENSION: ICD-10-CM

## 2025-03-11 DIAGNOSIS — I10 ESSENTIAL HYPERTENSION: Primary | ICD-10-CM

## 2025-03-11 DIAGNOSIS — I48.20 ATRIAL FIBRILLATION, CHRONIC: ICD-10-CM

## 2025-03-11 LAB
ALBUMIN SERPL BCP-MCNC: 4.1 G/DL (ref 3.5–5.2)
ALP SERPL-CCNC: 50 U/L (ref 40–150)
ALT SERPL W/O P-5'-P-CCNC: 16 U/L (ref 10–44)
ANION GAP SERPL CALC-SCNC: 10 MMOL/L (ref 8–16)
AST SERPL-CCNC: 24 U/L (ref 10–40)
BILIRUB SERPL-MCNC: 0.9 MG/DL (ref 0.1–1)
BUN SERPL-MCNC: 15 MG/DL (ref 8–23)
CALCIUM SERPL-MCNC: 9.2 MG/DL (ref 8.7–10.5)
CHLORIDE SERPL-SCNC: 105 MMOL/L (ref 95–110)
CHOLEST SERPL-MCNC: 125 MG/DL (ref 120–199)
CHOLEST/HDLC SERPL: 3 {RATIO} (ref 2–5)
CO2 SERPL-SCNC: 26 MMOL/L (ref 23–29)
CREAT SERPL-MCNC: 0.8 MG/DL (ref 0.5–1.4)
EST. GFR  (NO RACE VARIABLE): >60 ML/MIN/1.73 M^2
GLUCOSE SERPL-MCNC: 85 MG/DL (ref 70–110)
HDLC SERPL-MCNC: 41 MG/DL (ref 40–75)
HDLC SERPL: 32.8 % (ref 20–50)
LDLC SERPL CALC-MCNC: 62.4 MG/DL (ref 63–159)
NONHDLC SERPL-MCNC: 84 MG/DL
POTASSIUM SERPL-SCNC: 4.4 MMOL/L (ref 3.5–5.1)
PROT SERPL-MCNC: 7 G/DL (ref 6–8.4)
SODIUM SERPL-SCNC: 141 MMOL/L (ref 136–145)
T4 FREE SERPL-MCNC: 1.05 NG/DL (ref 0.71–1.51)
TRIGL SERPL-MCNC: 108 MG/DL (ref 30–150)
TSH SERPL DL<=0.005 MIU/L-ACNC: 4.45 UIU/ML (ref 0.4–4)

## 2025-03-11 PROCEDURE — 3288F FALL RISK ASSESSMENT DOCD: CPT | Mod: CPTII,S$GLB,, | Performed by: FAMILY MEDICINE

## 2025-03-11 PROCEDURE — 84443 ASSAY THYROID STIM HORMONE: CPT | Performed by: FAMILY MEDICINE

## 2025-03-11 PROCEDURE — 3079F DIAST BP 80-89 MM HG: CPT | Mod: CPTII,S$GLB,, | Performed by: FAMILY MEDICINE

## 2025-03-11 PROCEDURE — 80053 COMPREHEN METABOLIC PANEL: CPT | Performed by: FAMILY MEDICINE

## 2025-03-11 PROCEDURE — 84439 ASSAY OF FREE THYROXINE: CPT | Performed by: FAMILY MEDICINE

## 2025-03-11 PROCEDURE — 36415 COLL VENOUS BLD VENIPUNCTURE: CPT | Performed by: FAMILY MEDICINE

## 2025-03-11 PROCEDURE — 3077F SYST BP >= 140 MM HG: CPT | Mod: CPTII,S$GLB,, | Performed by: FAMILY MEDICINE

## 2025-03-11 PROCEDURE — 99999 PR PBB SHADOW E&M-EST. PATIENT-LVL III: CPT | Mod: PBBFAC,,, | Performed by: FAMILY MEDICINE

## 2025-03-11 PROCEDURE — 80061 LIPID PANEL: CPT | Performed by: FAMILY MEDICINE

## 2025-03-11 PROCEDURE — 1126F AMNT PAIN NOTED NONE PRSNT: CPT | Mod: CPTII,S$GLB,, | Performed by: FAMILY MEDICINE

## 2025-03-11 PROCEDURE — 1159F MED LIST DOCD IN RCRD: CPT | Mod: CPTII,S$GLB,, | Performed by: FAMILY MEDICINE

## 2025-03-11 PROCEDURE — 1101F PT FALLS ASSESS-DOCD LE1/YR: CPT | Mod: CPTII,S$GLB,, | Performed by: FAMILY MEDICINE

## 2025-03-11 PROCEDURE — 85025 COMPLETE CBC W/AUTO DIFF WBC: CPT | Performed by: FAMILY MEDICINE

## 2025-03-11 PROCEDURE — 99214 OFFICE O/P EST MOD 30 MIN: CPT | Mod: S$GLB,,, | Performed by: FAMILY MEDICINE

## 2025-03-11 NOTE — PROGRESS NOTES
Patient ID: Ezio James is a 74 y.o. male.    Chief Complaint: Follow-up    History of Present Illness    Mr. James presents today for follow up    He reports experiencing decreased energy levels.    He takes thyroid medication in the morning along with vitamin B12 and vitamin D supplements. The vitamin supplementation was based on recommendation rather than diagnosed deficiency.    He has a colonoscopy scheduled for the 20th. He has not eaten today in preparation.    B12 level was normal on previous testing.      ROS:  General: -fever, -chills, -fatigue, -weight gain, -weight loss  Eyes: -vision changes, -redness, -discharge  ENT: -ear pain, -nasal congestion, -sore throat  Cardiovascular: -chest pain, -palpitations, -lower extremity edema  Respiratory: -cough, -shortness of breath  Gastrointestinal: -abdominal pain, -nausea, -vomiting, -diarrhea, -constipation, -blood in stool  Genitourinary: -dysuria, -hematuria, -frequency  Musculoskeletal: -joint pain, -muscle pain  Skin: -rash, -lesion  Neurological: -headache, -dizziness, -numbness, -tingling, +weakness         Physical Exam    General: In no acute distress. Not ill-appearing or diaphoretic.  Neck: Normal thyroid. No cervical lymphadenopathy. 2+ carotid pulses.  Cardiovascular: Normal rate and irregular  rhythm. No murmur heard. No gallop.  Pulmonary: Pulmonary effort is normal. No respiratory distress. No wheezing. No rhonchi. No rales.  Abdominal: Soft. Nontender. Nondistended. No mass.  Musculoskeletal: No swelling. No tenderness. No deformity.  Neurological: Alert.  Psychiatric: Mood normal. Behavior normal.         Assessment & Plan    FATIGUE:  - Evaluated the patient's energy levels, noting a slight decrease.  - Mr. James reports decreased energy levels.  - Discussed increase physical activities walking    THYROID DISORDER:  - Continued thyroid medication at current dose.  - Mr. James is taking thyroid medication as prescribed.  - Ordered labs  including thyroid function to assess current status.        NUTRITIONAL ASSESSMENT:  - Assessed need for nutritional supplements, determining patient likely does not require if eating 3 meals daily.  - Clarified that nutritional drinks like Boost are beneficial supplements but unnecessary if maintaining a balanced diet.  - Discussed potential redundancy of supplements if the patient is using nutritional drinks.              SHINGLES VACCINATION:  - Considered immunizations, offering shingles vaccine.    RSV VACCINATION:  - Considered immunizations, offering RSV vaccine.  - Explained RSV as a virus similar to influenza, potentially causing severe symptoms in some individuals.    LABS:  - Ordered labs: complete blood count, blood sugar, kidney function, liver function.    FOLLOW UP:  - Follow up on the 20th as scheduled for colonoscopy.          He declined immunizations follow-up in 6 months    Follow up in about 6 months (around 9/11/2025).    This note was generated with the assistance of ambient listening technology. Verbal consent was obtained by the patient and accompanying visitor(s) for the recording of patient appointment to facilitate this note. I attest to having reviewed and edited the generated note for accuracy, though some syntax or spelling errors may persist. Please contact the author of this note for any clarification.

## 2025-03-12 ENCOUNTER — RESULTS FOLLOW-UP (OUTPATIENT)
Dept: INTERNAL MEDICINE | Facility: CLINIC | Age: 75
End: 2025-03-12

## 2025-03-12 LAB
BASOPHILS # BLD AUTO: 0.02 K/UL (ref 0–0.2)
BASOPHILS NFR BLD: 0.3 % (ref 0–1.9)
DIFFERENTIAL METHOD BLD: ABNORMAL
EOSINOPHIL # BLD AUTO: 0.1 K/UL (ref 0–0.5)
EOSINOPHIL NFR BLD: 2.1 % (ref 0–8)
ERYTHROCYTE [DISTWIDTH] IN BLOOD BY AUTOMATED COUNT: 12.1 % (ref 11.5–14.5)
HCT VFR BLD AUTO: 43.9 % (ref 40–54)
HGB BLD-MCNC: 14.6 G/DL (ref 14–18)
IMM GRANULOCYTES # BLD AUTO: 0.01 K/UL (ref 0–0.04)
IMM GRANULOCYTES NFR BLD AUTO: 0.2 % (ref 0–0.5)
LYMPHOCYTES # BLD AUTO: 1.9 K/UL (ref 1–4.8)
LYMPHOCYTES NFR BLD: 30 % (ref 18–48)
MCH RBC QN AUTO: 33 PG (ref 27–31)
MCHC RBC AUTO-ENTMCNC: 33.3 G/DL (ref 32–36)
MCV RBC AUTO: 99 FL (ref 82–98)
MONOCYTES # BLD AUTO: 0.4 K/UL (ref 0.3–1)
MONOCYTES NFR BLD: 6.2 % (ref 4–15)
NEUTROPHILS # BLD AUTO: 3.8 K/UL (ref 1.8–7.7)
NEUTROPHILS NFR BLD: 61.2 % (ref 38–73)
NRBC BLD-RTO: 0 /100 WBC
PLATELET # BLD AUTO: 227 K/UL (ref 150–450)
PMV BLD AUTO: 10.8 FL (ref 9.2–12.9)
RBC # BLD AUTO: 4.42 M/UL (ref 4.6–6.2)
WBC # BLD AUTO: 6.26 K/UL (ref 3.9–12.7)

## 2025-03-13 ENCOUNTER — TELEPHONE (OUTPATIENT)
Dept: INTERNAL MEDICINE | Facility: CLINIC | Age: 75
End: 2025-03-13
Payer: MEDICARE

## 2025-03-13 NOTE — TELEPHONE ENCOUNTER
Talked to pt test results given      ----- Message from [employer] sent at 3/13/2025  8:30 AM CDT -----  Contact: patient  Type:  Patient Returning CallWho Called:Ezio James Who Left Message for Patient: nurse Does the patient know what this is regarding?: Test results Would the patient rather a call back or a response via [facility]?  Call Best Call Back Number: [phone] Additional Information:

## 2025-03-17 ENCOUNTER — LAB VISIT (OUTPATIENT)
Dept: LAB | Facility: HOSPITAL | Age: 75
End: 2025-03-17
Attending: UROLOGY
Payer: MEDICARE

## 2025-03-17 ENCOUNTER — OFFICE VISIT (OUTPATIENT)
Dept: UROLOGY | Facility: CLINIC | Age: 75
End: 2025-03-17
Payer: MEDICARE

## 2025-03-17 VITALS
SYSTOLIC BLOOD PRESSURE: 121 MMHG | HEIGHT: 70 IN | HEART RATE: 76 BPM | DIASTOLIC BLOOD PRESSURE: 79 MMHG | WEIGHT: 196.19 LBS | BODY MASS INDEX: 28.09 KG/M2

## 2025-03-17 DIAGNOSIS — R35.0 BENIGN PROSTATIC HYPERPLASIA WITH URINARY FREQUENCY: ICD-10-CM

## 2025-03-17 DIAGNOSIS — N40.1 BENIGN PROSTATIC HYPERPLASIA WITH URINARY FREQUENCY: Primary | ICD-10-CM

## 2025-03-17 DIAGNOSIS — N40.1 BENIGN PROSTATIC HYPERPLASIA WITH URINARY FREQUENCY: ICD-10-CM

## 2025-03-17 DIAGNOSIS — R35.0 BENIGN PROSTATIC HYPERPLASIA WITH URINARY FREQUENCY: Primary | ICD-10-CM

## 2025-03-17 DIAGNOSIS — R31.0 HEMATURIA, GROSS: ICD-10-CM

## 2025-03-17 LAB — COMPLEXED PSA SERPL-MCNC: 0.28 NG/ML (ref 0–4)

## 2025-03-17 PROCEDURE — 99999 PR PBB SHADOW E&M-EST. PATIENT-LVL III: CPT | Mod: PBBFAC,,, | Performed by: UROLOGY

## 2025-03-17 PROCEDURE — 84153 ASSAY OF PSA TOTAL: CPT | Performed by: UROLOGY

## 2025-03-17 PROCEDURE — 1101F PT FALLS ASSESS-DOCD LE1/YR: CPT | Mod: CPTII,S$GLB,, | Performed by: UROLOGY

## 2025-03-17 PROCEDURE — 1126F AMNT PAIN NOTED NONE PRSNT: CPT | Mod: CPTII,S$GLB,, | Performed by: UROLOGY

## 2025-03-17 PROCEDURE — 3288F FALL RISK ASSESSMENT DOCD: CPT | Mod: CPTII,S$GLB,, | Performed by: UROLOGY

## 2025-03-17 PROCEDURE — 99214 OFFICE O/P EST MOD 30 MIN: CPT | Mod: S$GLB,,, | Performed by: UROLOGY

## 2025-03-17 PROCEDURE — 3074F SYST BP LT 130 MM HG: CPT | Mod: CPTII,S$GLB,, | Performed by: UROLOGY

## 2025-03-17 PROCEDURE — 36415 COLL VENOUS BLD VENIPUNCTURE: CPT | Performed by: UROLOGY

## 2025-03-17 PROCEDURE — 3078F DIAST BP <80 MM HG: CPT | Mod: CPTII,S$GLB,, | Performed by: UROLOGY

## 2025-03-17 PROCEDURE — 1159F MED LIST DOCD IN RCRD: CPT | Mod: CPTII,S$GLB,, | Performed by: UROLOGY

## 2025-03-17 RX ORDER — TAMSULOSIN HYDROCHLORIDE 0.4 MG/1
0.4 CAPSULE ORAL DAILY
Qty: 90 CAPSULE | Refills: 3 | Status: SHIPPED | OUTPATIENT
Start: 2025-03-17 | End: 2026-03-12

## 2025-03-17 NOTE — PROGRESS NOTES
"Chief Complaint:   Encounter Diagnoses   Name Primary?    Benign prostatic hyperplasia with urinary frequency Yes    Hematuria, gross        HPI:  HPI Ezio James jordi 74 y.o. male who presents with follow up from BPH and history of hematuria.  Patient is maintained on tamsulosin for lower urinary tract symptoms.  He has been on this for many years with no real complaints today.  He has not had any further hematuria.  He did undergo a cystoscopy about 9 years ago which was unrevealing.  He had a recurrent episode about a year or 2 ago which has not recurred.    History:  Social History[1]  Past Medical History:   Diagnosis Date    Atrial fibrillation, chronic     Chronic systolic congestive heart failure 5/4/2018    Colon polyp 04,15    Essential hypertension 6/14/2017    Pulmonary hypertension 11/1/2023    Subclinical hypothyroidism      Past Surgical History:   Procedure Laterality Date    COLONOSCOPY  2004,5/15    with polyps per Chart    LEFT HEART CATHETERIZATION Left 6/21/2018    Procedure: HEART CATH-LEFT;  Surgeon: Leon James MD;  Location: [facility] CATH LAB;  Service: Cardiology;  Laterality: Left;  7:30 start time  right radial approach     Family History   Problem Relation Name Age of Onset    Lung cancer Mother      Hypertension Mother      Obesity Mother      Cataracts Mother      Emphysema Father      Heart disease Father         Medications Ordered Prior to Encounter[2]     Objective:     Vitals:    03/17/25 0829   BP: 121/79   Pulse: 76   Weight: 89 kg (196 lb 3.4 oz)   Height: 5' 10" (1.778 m)      BMI Readings from Last 1 Encounters:   03/17/25 28.15 kg/m²          Physical Exam  No acute distress alert and oriented   Respirations even unlabored   Abdomen is soft nontender   Digital rectal exam reveals a 20 g smooth prostate     Urinalysis clear     Postvoid residual 38 cc  Lab Results   Component Value Date    PSA 0.25 03/13/2024    PSA 0.20 02/01/2023    PSA 0.21 02/18/2021    PSA 0.31 " 11/07/2019    PSA 0.34 04/24/2018    PSA 0.39 05/20/2016    PSA 0.33 03/13/2014        Lab Results   Component Value Date    CREATININE 0.8 03/11/2025      Assessment:       1. Benign prostatic hyperplasia with urinary frequency    2. Hematuria, gross        Plan:     1. Benign prostatic hyperplasia with urinary frequency    2. Hematuria, gross       Orders Placed This Encounter    Prostate Specific Antigen, Diagnostic    tamsulosin (FLOMAX) 0.4 mg Cap      BPH with a history of hematuria in the past.  We will check PSA today.  Discussed PSA screening.  We will continue to screen as long as his health is maintained.  We will re-evaluate him in 1 year or earlier as needed.  Continue tamsulosin.       [1]   Social History  Tobacco Use    Smoking status: Never    Smokeless tobacco: Never   Substance Use Topics    Alcohol use: Yes     Comment: Rarely    Drug use: No   [2]   Current Outpatient Medications on File Prior to Visit   Medication Sig Dispense Refill    aspirin (ECOTRIN) 81 MG EC tablet Take 1 tablet (81 mg total) by mouth once daily. 90 tablet 2    cholecalciferol, vitamin D3, 125 mcg (5,000 unit) Tab Take 2,000 Units by mouth once daily.      cyanocobalamin (VITAMIN B-12) 1000 MCG tablet Take 1,000 mcg by mouth once daily.      levothyroxine (SYNTHROID) 88 MCG tablet TAKE 1 TABLET BEFORE BREAKFAST 90 tablet 2    lisinopriL (PRINIVIL,ZESTRIL) 20 MG tablet Take 1 tablet (20 mg total) by mouth once daily. 90 tablet 3    metoprolol succinate (TOPROL-XL) 50 MG 24 hr tablet TAKE 1 TABLET DAILY 90 tablet 3    multivitamin capsule Take 1 capsule by mouth once daily.      primidone (MYSOLINE) 50 MG Tab Take 1 tablet (50 mg total) by mouth 2 (two) times daily. 180 tablet 3    rosuvastatin (CRESTOR) 10 MG tablet TAKE 1 TABLET EVERY EVENING 90 tablet 3    [DISCONTINUED] tamsulosin (FLOMAX) 0.4 mg Cap Take 1 capsule (0.4 mg total) by mouth once daily. 90 capsule 3     No current facility-administered medications on file  prior to visit.      Never smoker

## 2025-04-07 DIAGNOSIS — E78.5 DYSLIPIDEMIA: ICD-10-CM

## 2025-04-07 RX ORDER — ROSUVASTATIN CALCIUM 10 MG/1
10 TABLET, COATED ORAL NIGHTLY
Qty: 90 TABLET | Refills: 3 | Status: SHIPPED | OUTPATIENT
Start: 2025-04-07

## 2025-04-07 NOTE — TELEPHONE ENCOUNTER
Refill Decision Note   Ezio James  is requesting a refill authorization.  Brief Assessment and Rationale for Refill:  Approve     Medication Therapy Plan:         Comments:     Note composed:6:41 AM 04/07/2025

## 2025-04-07 NOTE — TELEPHONE ENCOUNTER
No care due was identified.  [facility] Embedded Care Due Messages. Reference number: [id number].   4/07/2025 12:53:01 AM CDT

## 2025-04-28 ENCOUNTER — OFFICE VISIT (OUTPATIENT)
Dept: NEUROLOGY | Facility: CLINIC | Age: 75
End: 2025-04-28
Payer: MEDICARE

## 2025-04-28 VITALS
HEIGHT: 70 IN | BODY MASS INDEX: 28.62 KG/M2 | DIASTOLIC BLOOD PRESSURE: 88 MMHG | WEIGHT: 199.94 LBS | RESPIRATION RATE: 16 BRPM | SYSTOLIC BLOOD PRESSURE: 129 MMHG | HEART RATE: 77 BPM

## 2025-04-28 DIAGNOSIS — M25.50 ARTHRALGIA, UNSPECIFIED JOINT: ICD-10-CM

## 2025-04-28 DIAGNOSIS — I51.7 LAE (LEFT ATRIAL ENLARGEMENT): ICD-10-CM

## 2025-04-28 DIAGNOSIS — Z86.0100 HISTORY OF COLON POLYPS: ICD-10-CM

## 2025-04-28 DIAGNOSIS — I42.8 OTHER CARDIOMYOPATHY: ICD-10-CM

## 2025-04-28 DIAGNOSIS — R25.1 TREMOR: ICD-10-CM

## 2025-04-28 DIAGNOSIS — I42.8 NON-ISCHEMIC CARDIOMYOPATHY: ICD-10-CM

## 2025-04-28 DIAGNOSIS — Z82.0 FAMILY HISTORY OF PARKINSON DISEASE: ICD-10-CM

## 2025-04-28 DIAGNOSIS — I10 ESSENTIAL HYPERTENSION: ICD-10-CM

## 2025-04-28 DIAGNOSIS — E66.3 OVERWEIGHT (BMI 25.0-29.9): ICD-10-CM

## 2025-04-28 DIAGNOSIS — E03.8 SUBCLINICAL HYPOTHYROIDISM: ICD-10-CM

## 2025-04-28 DIAGNOSIS — E53.8 B12 DEFICIENCY: ICD-10-CM

## 2025-04-28 DIAGNOSIS — E03.9 HYPOTHYROIDISM, UNSPECIFIED TYPE: ICD-10-CM

## 2025-04-28 DIAGNOSIS — G25.0 BENIGN ESSENTIAL TREMOR: Primary | ICD-10-CM

## 2025-04-28 DIAGNOSIS — I48.20 ATRIAL FIBRILLATION, CHRONIC: ICD-10-CM

## 2025-04-28 DIAGNOSIS — E78.6 LOW HDL (UNDER 40): ICD-10-CM

## 2025-04-28 DIAGNOSIS — J32.9 SINUSITIS, UNSPECIFIED CHRONICITY, UNSPECIFIED LOCATION: ICD-10-CM

## 2025-04-28 DIAGNOSIS — H91.90 HEARING LOSS, UNSPECIFIED HEARING LOSS TYPE, UNSPECIFIED LATERALITY: ICD-10-CM

## 2025-04-28 DIAGNOSIS — I51.7 LVH (LEFT VENTRICULAR HYPERTROPHY): ICD-10-CM

## 2025-04-28 PROCEDURE — 99999 PR PBB SHADOW E&M-EST. PATIENT-LVL IV: CPT | Mod: PBBFAC,,, | Performed by: NURSE PRACTITIONER

## 2025-04-28 RX ORDER — POLYETHYLENE GLYCOL 3350, SODIUM SULFATE, SODIUM CHLORIDE, POTASSIUM CHLORIDE, ASCORBIC ACID, SODIUM ASCORBATE 140-9-5.2G
1 KIT ORAL
COMMUNITY
Start: 2025-02-04

## 2025-04-28 RX ORDER — SODIUM, POTASSIUM,MAG SULFATES 17.5-3.13G
SOLUTION, RECONSTITUTED, ORAL ORAL
COMMUNITY
Start: 2025-03-17

## 2025-04-28 RX ORDER — AZITHROMYCIN 250 MG/1
TABLET, FILM COATED ORAL
Qty: 6 TABLET | Refills: 0 | Status: SHIPPED | OUTPATIENT
Start: 2025-04-28 | End: 2025-05-03

## 2025-04-28 RX ORDER — PRIMIDONE 50 MG/1
50 TABLET ORAL 3 TIMES DAILY
Qty: 270 TABLET | Refills: 3 | Status: SHIPPED | OUTPATIENT
Start: 2025-04-28 | End: 2026-04-28

## 2025-04-28 NOTE — PROGRESS NOTES
Subjective:       Patient ID: Ezio James is a 74 y.o. male.    Chief Complaint: Benign essential tremor        HPI     The patient is presenting with tremors that started 7 years ago (2015). The tremors started insidiously and progressed slowly over the last 7 years. The tremors are mainly in both arms (RT>LT). The tremors do emerge during action like writing or holding things. No rest tremors. No neck tremors. No leg tremors upon standing. No voice tremors. No muscle rigidity of muscle stiffness. No postural instability. No memory loss or dementia. Cannot tell if alcohol improves the tremors because the patient does not drink enough. Anxiety and emotional stress exacerbates the tremor. No significant diurnal variation in the tremors. No falls. No history of strokes. No head injury. No stimulants on board. No new meds started recently. Multiple family members have similar tremors, especially his father. One of his brothers was diagnosed with Parkinsons disease. He noted that coffee made the tremors much worse. Patient started primidone 50 mg QHS without adverse effects. He states his tremors have improved on the medication. He states he is happy with his current regiment. 02- CTH No significant abnormalities. Atrophy and chronic white matter changes.    INTERVAL HISTORY 04-: Patient presents to follow up for ET management. Patient is unaccompanied. Patient continues to take Primidone 50 mg AM/ 50 mg PM with greater  tremor reduction. No adverse effects. Discuss updating plan of care.  Will try  increasing frequency to TID. Patient agree to plan.          Review of Systems   Constitutional:  Negative for appetite change and fatigue.   HENT:  Positive for hearing loss, sinus pressure, sinus pain and sneezing. Negative for tinnitus.    Eyes:  Positive for redness. Negative for photophobia and visual disturbance.   Respiratory:  Negative for apnea and shortness of breath.    Cardiovascular:  Negative  for chest pain and palpitations.   Gastrointestinal:  Negative for nausea and vomiting.   Endocrine: Negative for cold intolerance and heat intolerance.   Genitourinary:  Negative for difficulty urinating and urgency.   Musculoskeletal:  Positive for gait problem. Negative for arthralgias, back pain, joint swelling, myalgias, neck pain and neck stiffness.   Skin:  Negative for color change and rash.   Allergic/Immunologic: Negative for environmental allergies and immunocompromised state.   Neurological:  Positive for tremors. Negative for dizziness, seizures, syncope, facial asymmetry, speech difficulty, weakness, light-headedness, numbness and headaches.   Hematological:  Negative for adenopathy. Does not bruise/bleed easily.   Psychiatric/Behavioral:  Negative for agitation, behavioral problems, confusion, decreased concentration, dysphoric mood, hallucinations, self-injury, sleep disturbance and suicidal ideas. The patient is not hyperactive.                Current Outpatient Medications:     aspirin (ECOTRIN) 81 MG EC tablet, Take 1 tablet (81 mg total) by mouth once daily., Disp: 90 tablet, Rfl: 2    cholecalciferol, vitamin D3, 125 mcg (5,000 unit) Tab, Take 2,000 Units by mouth once daily., Disp: , Rfl:     cyanocobalamin (VITAMIN B-12) 1000 MCG tablet, Take 1,000 mcg by mouth once daily., Disp: , Rfl:     levothyroxine (SYNTHROID) 88 MCG tablet, TAKE 1 TABLET BEFORE BREAKFAST, Disp: 90 tablet, Rfl: 2    lisinopriL (PRINIVIL,ZESTRIL) 20 MG tablet, Take 1 tablet (20 mg total) by mouth once daily., Disp: 90 tablet, Rfl: 3    metoprolol succinate (TOPROL-XL) 50 MG 24 hr tablet, TAKE 1 TABLET DAILY, Disp: 90 tablet, Rfl: 3    multivitamin capsule, Take 1 capsule by mouth once daily., Disp: , Rfl:     PLENVU 140-9-5.2 gram PPkS, Take 1 kit by mouth., Disp: , Rfl:     rosuvastatin (CRESTOR) 10 MG tablet, TAKE 1 TABLET EVERY EVENING, Disp: 90 tablet, Rfl: 3    sodium,potassium,mag sulfates (SUPREP BOWEL PREP KIT)  17.5-3.13-1.6 gram SolR, , Disp: , Rfl:     tamsulosin (FLOMAX) 0.4 mg Cap, Take 1 capsule (0.4 mg total) by mouth once daily., Disp: 90 capsule, Rfl: 3    azithromycin (Z-EVARISTO) 250 MG tablet, Take 2 tablets by mouth on day 1; Take 1 tablet by mouth on days 2-5, Disp: 6 tablet, Rfl: 0    primidone (MYSOLINE) 50 MG Tab, Take 1 tablet (50 mg total) by mouth 3 (three) times daily., Disp: 270 tablet, Rfl: 3         Past Medical History:   Diagnosis Date    Atrial fibrillation, chronic     Chronic systolic congestive heart failure 5/4/2018    Colon polyp 04,15    Essential hypertension 6/14/2017    Pulmonary hypertension 11/1/2023    Subclinical hypothyroidism      Past Surgical History:   Procedure Laterality Date    COLONOSCOPY  2004,5/15    with polyps per Chart    LEFT HEART CATHETERIZATION Left 6/21/2018    Procedure: HEART CATH-LEFT;  Surgeon: Leon James MD;  Location: [facility] CATH LAB;  Service: Cardiology;  Laterality: Left;  7:30 start time  right radial approach     Social History     Socioeconomic History    Marital status:    Tobacco Use    Smoking status: Never    Smokeless tobacco: Never   Substance and Sexual Activity    Alcohol use: Yes     Comment: Rarely    Drug use: No    Sexual activity: Yes     Partners: Female     Birth control/protection: None     Social Drivers of Health     Financial Resource Strain: Low Risk  (12/5/2024)    Overall Financial Resource Strain (CARDIA)     Difficulty of Paying Living Expenses: Not hard at all   Food Insecurity: No Food Insecurity (12/5/2024)    Hunger Vital Sign     Worried About Running Out of Food in the Last Year: Never true     Ran Out of Food in the Last Year: Never true   Physical Activity: Insufficiently Active (12/5/2024)    Exercise Vital Sign     Days of Exercise per Week: 4 days     Minutes of Exercise per Session: 10 min   Stress: No Stress Concern Present (12/5/2024)    [employer] - Occupational Stress  Questionnaire     Feeling of Stress : Only a little   Housing Stability: Unknown (12/5/2024)    Housing Stability Vital Sign     Unable to Pay for Housing in the Last Year: No             Past/Current Medical/Surgical History, Past/Current Social History, Past/Current Family History and Past/Current Medications were reviewed in detail.        Objective:     VITAL SIGNS WERE REVIEWED      GENERAL APPEARANCE:     The patient looks comfortable.    BMI 28.69    No signs of respiratory distress.    Normal breathing pattern.    No dysmorphic features    Normal eye contact.     GENERAL MEDICAL EXAM:    HEENT:  Head is atraumatic normocephalic. Fundoscopic (Ophthalmoscopic) exam showed no disc edema.      Neck and Axillae: No JVD. No visible lesions.    Cardiopulmonary: No cyanosis. No tachypnea. Normal respiratory effort.    Gastrointestinal/Urogenital:  No jaundice. No stomas or lesions. No visible hernias. No catheters.     Skin, Hair and Nails: No pathognonomic skin rash. No neurofibromatosis. No visible lesions.No stigmata of autoimmune disease. No clubbing.    Limbs: No varicose veins. No visible swelling.    Muskoskeletal: No visible deformities.No visible lesions. Ortho boot on right foot-fracture           Neurologic Exam     Mental Status   Oriented to person, place, and time.   Follows 3 step commands.   Attention: normal. Concentration: normal.   Speech: speech is normal   Level of consciousness: alert  Knowledge: good.   Able to name object. Able to repeat. Normal comprehension.     Cranial Nerves   Cranial nerves II through XII intact.     CN II   Visual fields full to confrontation.   Visual acuity: normal  Right visual field deficit: none  Left visual field deficit: none     CN III, IV, VI   Pupils are equal, round, and reactive to light.  Extraocular motions are normal.   Right pupil: Size: 2 mm. Shape: regular. Reactivity: brisk. Consensual response: intact. Accommodation: intact.   Left pupil: Size: 2 mm.  Shape: regular. Reactivity: brisk. Consensual response: intact. Accommodation: intact.   CN III: no CN III palsy  CN VI: no CN VI palsy  Nystagmus: none   Diplopia: none  Ophthalmoparesis: none  Upgaze: normal  Downgaze: normal  Conjugate gaze: present  Vestibulo-ocular reflex: present    CN V   Facial sensation intact.   Right facial sensation deficit: none  Left facial sensation deficit: none    CN VII   Facial expression full, symmetric.   Right facial weakness: none  Left facial weakness: none    CN VIII   CN VIII normal.   Hearing: intact    CN IX, X   CN IX normal.   CN X normal.   Palate: symmetric    CN XI   CN XI normal.   Right sternocleidomastoid strength: normal  Left sternocleidomastoid strength: normal  Right trapezius strength: normal  Left trapezius strength: normal    CN XII   CN XII normal.   Tongue: not atrophic  Fasciculations: absent  Tongue deviation: none    Motor Exam   Muscle bulk: normal  Overall muscle tone: normal  Right arm tone: normal  Left arm tone: normal  Right arm pronator drift: absent  Left arm pronator drift: absent  Right leg tone: normal  Left leg tone: normal    Strength   Strength 5/5 throughout.   Right strength:   Ortho boot on right foot-fracture  Right neck flexion: 5/5  Left neck flexion: 5/5  Right neck extension: 5/5  Left neck extension: 5/5  Right deltoid: 5/5  Left deltoid: 5/5  Right biceps: 5/5  Left biceps: 5/5  Right triceps: 5/5  Left triceps: 5/5  Right wrist flexion: 5/5  Left wrist flexion: 5/5  Right wrist extension: 5/5  Left wrist extension: 5/5  Right interossei: 5/5  Left interossei: 5/5  Right iliopsoas: 5/5  Left iliopsoas: 5/5  Right quadriceps: 5/5  Left quadriceps: 5/5  Right hamstrin/5  Left hamstrin/5  Right glutei: 5/5  Left glutei: 5/5  Left anterior tibial: 5/5  Left posterior tibial: 5/5  Left peroneal: 5/5  Left gastroc: 5/5    Sensory Exam   Light touch normal.   Right arm light touch: normal  Left arm light touch: normal  Right  leg light touch: normal  Left leg light touch: normal  Right arm vibration: normal  Left arm vibration: normal  Right leg vibration: decreased from toes  Left leg vibration: decreased from toes  Right arm proprioception: normal  Left arm proprioception: normal  Right leg proprioception: decreased from toes  Left leg proprioception: decreased from toes  Pinprick normal.   Right arm pinprick: normal  Left arm pinprick: normal  Right leg pinprick: normal  Left leg pinprick: normal  Graphesthesia: normal  Stereognosis: normal    Gait, Coordination, and Reflexes     Gait  Gait: normal    Coordination   Romberg: negative  Finger to nose coordination: normal  Heel to shin coordination: normal  Tandem walking coordination: normal    Tremor   Resting tremor: absent  Intention tremor: absent  Action tremor: left arm and right arm    Reflexes   Right brachioradialis: 2+  Left brachioradialis: 2+  Right biceps: 2+  Left biceps: 2+  Right triceps: 2+  Left triceps: 2+  Right patellar: 1+  Left patellar: 1+  Right achilles: 0  Left achilles: 0  Right plantar: normal  Left plantar: normal  Right Henderson: absent  Left Henderson: absent  Right ankle clonus: absent  Left ankle clonus: absent  Right pendular knee jerk: absent  Left pendular knee jerk: absent  BUE AP 8 Hz Tremors     RT>LT    IMPROVED      Lab Results   Component Value Date    WBC 6.26 03/11/2025    HGB 14.6 03/11/2025    HCT 43.9 03/11/2025    MCV 99 (H) 03/11/2025     03/11/2025     Sodium   Date Value Ref Range Status   03/11/2025 141 136 - 145 mmol/L Final     Potassium   Date Value Ref Range Status   03/11/2025 4.4 3.5 - 5.1 mmol/L Final     Chloride   Date Value Ref Range Status   03/11/2025 105 95 - 110 mmol/L Final     CO2   Date Value Ref Range Status   03/11/2025 26 23 - 29 mmol/L Final     Glucose   Date Value Ref Range Status   03/11/2025 85 70 - 110 mg/dL Final     BUN   Date Value Ref Range Status   03/11/2025 15 8 - 23 mg/dL Final     Creatinine    Date Value Ref Range Status   03/11/2025 0.8 0.5 - 1.4 mg/dL Final     Calcium   Date Value Ref Range Status   03/11/2025 9.2 8.7 - 10.5 mg/dL Final     Total Protein   Date Value Ref Range Status   03/11/2025 7.0 6.0 - 8.4 g/dL Final     Albumin   Date Value Ref Range Status   03/11/2025 4.1 3.5 - 5.2 g/dL Final     Total Bilirubin   Date Value Ref Range Status   03/11/2025 0.9 0.1 - 1.0 mg/dL Final     Comment:     For infants and newborns, interpretation of results should be based  on gestational age, weight and in agreement with clinical  observations.    Premature Infant recommended reference ranges:  Up to 24 hours.............<8.0 mg/dL  Up to 48 hours............<12.0 mg/dL  3-5 days..................<15.0 mg/dL  6-29 days.................<15.0 mg/dL       Alkaline Phosphatase   Date Value Ref Range Status   03/11/2025 50 40 - 150 U/L Final     AST   Date Value Ref Range Status   03/11/2025 24 10 - 40 U/L Final     ALT   Date Value Ref Range Status   03/11/2025 16 10 - 44 U/L Final     Anion Gap   Date Value Ref Range Status   03/11/2025 10 8 - 16 mmol/L Final     eGFR if    Date Value Ref Range Status   02/01/2022 >60.0 >60 mL/min/1.73 m^2 Final     eGFR if non    Date Value Ref Range Status   02/01/2022 >60.0 >60 mL/min/1.73 m^2 Final     Comment:     Calculation used to obtain the estimated glomerular filtration  rate (eGFR) is the CKD-EPI equation.        Lab Results   Component Value Date    [id number] 737 02/01/2023     Lab Results   Component Value Date    TSH 4.449 (H) 03/11/2025    FREET4 1.05 03/11/2025 02-    Free T4 NL      02-    CTH No significant abnormalities     Atrophy and chronic white matter changes      Reviewed the neuroimaging independently       Assessment:       1. Benign essential tremor    2. Sinusitis, unspecified chronicity, unspecified location    3. Tremor    4. Other cardiomyopathy    5. Atrial fibrillation, chronic    6.  Subclinical hypothyroidism    7. Hypothyroidism, unspecified type    8. LVH (left ventricular hypertrophy)    9. Non-ischemic cardiomyopathy    10. History of colon polyps    11. LAE (left atrial enlargement)    12. Hearing loss, unspecified hearing loss type, unspecified laterality    13. Family history of Parkinson disease    14. Overweight (BMI 25.0-29.9)    15. B12 deficiency    16. Arthralgia, unspecified joint    17. Essential hypertension    18. Low HDL (under 40)              Plan:       ESSENTIAL TREMOR (ET), BENIGN, BUE, RT >LT, ONSET 2015       Will order MIGUEL scan     Avoid stimulants like caffeine, nicotineetc.    Avoid hot drinks, drink from half empty glasses and wear heavy bracelet/watch.     Change Primidone   50 mg po BID .     I also counseled the patient about the fact the medication decreases the amplitude and not the rate of the tremor and less effective for titubition.  I also counseled the patient that the disease is slowly progressive.       NEXT OPTIONS:    Topiramate (Topamax)TPM titration to  mg BID which can cause mental slowing, transient tingling, kidney stones, weight loss, cleft lip and palate and rarely glaucoma and visual field defects . The patient was encouraged to drink a lot of fluids.     Methazolamide (Neptazane) 50 mg TID (100 mg BID) which can cause sedation, numbness, gastrointestinal upset and rarely kidney stones. Safety during pregnancy is unknown.    He is already on Metoprolol.         INTERVENTIONAL OPTIONS:      Neuravive, Gamma Knife and DBS.         MEDICAL/SURGICAL COMORBIDITIES     ACUTE SINUSITIS     Give Z-pack if worsening contact PCP for further management       All relevant medical comorbidities noted and managed by primary care physician and medical care team.          MISCELLANEOUS MEDICAL PROBLEMS       HEALTHY LIFESTYLE AND PREVENTATIVE CARE    The patient to adhere to the age-appropriate health maintenance guidelines including screening tests and  vaccinations. The patient to adhere to  healthy lifestyle, optimal weight, exercise, healthy diet, good sleep hygiene and avoiding drugs including smoking, alcohol and recreational drugs.      I spent a total of 30 minutes on the day of the visit.    This includes face to face time and non-face to face time preparing to see the patient (eg, review of tests), obtaining and/or reviewing separately obtained history, documenting clinical information in the electronic or other health record, independently interpreting results and communicating results to the patient/family/caregiver, or care coordinator.        RTC  3 months          Alcon Maddox, MSN NP      Collaborating Provider: Geri Meeks MD, FAAN Neurologist/Epileptologist

## 2025-04-30 DIAGNOSIS — I42.9 CARDIOMYOPATHY, UNSPECIFIED TYPE: ICD-10-CM

## 2025-04-30 RX ORDER — METOPROLOL SUCCINATE 50 MG/1
50 TABLET, EXTENDED RELEASE ORAL
Qty: 90 TABLET | Refills: 3 | Status: SHIPPED | OUTPATIENT
Start: 2025-04-30

## 2025-05-09 ENCOUNTER — HOSPITAL ENCOUNTER (OUTPATIENT)
Dept: RADIOLOGY | Facility: HOSPITAL | Age: 75
Discharge: HOME | End: 2025-05-09
Attending: PHYSICIAN ASSISTANT
Payer: MEDICARE

## 2025-05-09 ENCOUNTER — OFFICE VISIT (OUTPATIENT)
Dept: INTERNAL MEDICINE | Facility: CLINIC | Age: 75
End: 2025-05-09
Payer: MEDICARE

## 2025-05-09 VITALS
TEMPERATURE: 96 F | RESPIRATION RATE: 20 BRPM | SYSTOLIC BLOOD PRESSURE: 128 MMHG | HEIGHT: 70 IN | HEART RATE: 59 BPM | OXYGEN SATURATION: 98 % | BODY MASS INDEX: 28.41 KG/M2 | DIASTOLIC BLOOD PRESSURE: 84 MMHG | WEIGHT: 198.44 LBS

## 2025-05-09 DIAGNOSIS — M25.561 ACUTE PAIN OF RIGHT KNEE: ICD-10-CM

## 2025-05-09 DIAGNOSIS — M25.461 EFFUSION, RIGHT KNEE: ICD-10-CM
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
## 2025-05-09 DIAGNOSIS — M25.561 ACUTE PAIN OF RIGHT KNEE: Primary | ICD-10-CM

## 2025-05-09 DIAGNOSIS — I10 ESSENTIAL HYPERTENSION: ICD-10-CM

## 2025-05-09 PROCEDURE — 73562 X-RAY EXAM OF KNEE 3: CPT | Mod: 26,RT,, | Performed by: RADIOLOGY

## 2025-05-09 PROCEDURE — 73560 X-RAY EXAM OF KNEE 1 OR 2: CPT | Mod: 26,59,LT, | Performed by: RADIOLOGY

## 2025-05-09 PROCEDURE — 99999 PR PBB SHADOW E&M-EST. PATIENT-LVL V: CPT | Mod: PBBFAC,,, | Performed by: PHYSICIAN ASSISTANT

## 2025-05-09 PROCEDURE — 73562 X-RAY EXAM OF KNEE 3: CPT | Mod: TC,RT

## 2025-05-09 RX ORDER — DICLOFENAC SODIUM 10 MG/G
2 GEL TOPICAL DAILY
Qty: 100 G | Refills: 0 | Status: SHIPPED | OUTPATIENT
Start: 2025-05-09

## 2025-05-09 NOTE — PROGRESS NOTES
"Subjective:      Patient ID: Ezio James is a 74 y.o. male.    Chief Complaint: Joint Swelling (He is here due to swelling in his right knee. States he fell on 4/28 and still has some swelling in his knee, tenderness has eased up some. )    Patient is new to me, being seen today for knee pain.   Fall on 4/28 and landed on knees on concrete, with swelling and tenderness since that time   Treatment includes ice packs, Tylenol, elevation with mild-mod improvement   Denies swelling of leg  Denies numbness/tingling  No other injury from fall     Takes baby aspirin     Last visit March 2025 w PCP.       Review of Systems   Constitutional:  Negative for chills, diaphoresis and fever.   HENT:  Negative for congestion, rhinorrhea and sore throat.    Respiratory:  Negative for cough, shortness of breath and wheezing.    Gastrointestinal:  Negative for abdominal pain, constipation, diarrhea, nausea and vomiting.   Musculoskeletal:  Positive for arthralgias and joint swelling.   Skin:  Negative for rash.   Neurological:  Negative for dizziness, light-headedness and headaches.       Objective:   /84 (BP Location: Left arm, Patient Position: Sitting)   Pulse (!) 59   Temp 96.1 °F (35.6 °C) (Tympanic)   Resp 20   Ht 5' 10" (1.778 m)   Wt 90 kg (198 lb 6.6 oz)   SpO2 98%   BMI 28.47 kg/m²   Physical Exam  Constitutional:       General: He is not in acute distress.     Appearance: He is well-developed. He is not ill-appearing or diaphoretic.   HENT:      Head: Normocephalic and atraumatic.      Right Ear: External ear normal.      Left Ear: External ear normal.   Eyes:      General: Lids are normal.         Right eye: No discharge.         Left eye: No discharge.      Conjunctiva/sclera: Conjunctivae normal.      Right eye: Right conjunctiva is not injected.      Left eye: Left conjunctiva is not injected.   Pulmonary:      Effort: Pulmonary effort is normal. No respiratory distress.   Musculoskeletal:      Right " knee: Swelling (localized medial superior area of swelling) and bony tenderness (minimal) present. No crepitus. Normal range of motion.        Legs:    Skin:     General: Skin is warm and dry.      Findings: No rash.   Neurological:      Mental Status: He is alert and oriented to person, place, and time.   Psychiatric:         Speech: Speech normal.         Behavior: Behavior normal.         Thought Content: Thought content normal.         Judgment: Judgment normal.       Assessment:      1. Acute pain of right knee    2. Essential hypertension    3. Fall, initial encounter    4. Effusion, right knee       Plan:   Acute pain of right knee  -     X-Ray Knee Complete 4 Or More Views Right; Future; Expected date: 05/09/2025  -     Ambulatory referral/consult to Orthopedics; Future; Expected date: 05/16/2025  -     diclofenac sodium (VOLTAREN ARTHRITIS PAIN) 1 % Gel; Apply 2 g topically once daily.  Dispense: 100 g; Refill: 0    Fall, initial encounter  -     X-Ray Knee Complete 4 Or More Views Right; Future; Expected date: 05/09/2025    Effusion, right knee  -     Ambulatory referral/consult to Orthopedics; Future; Expected date: 05/16/2025    Essential hypertension    Discussed RICE, topical NSAIDs, Tylenol    Discussed worsening signs/symptoms and when to return to clinic or go to ED.   Patient expresses understanding and agrees with treatment plan.

## 2025-05-12 ENCOUNTER — RESULTS FOLLOW-UP (OUTPATIENT)
Dept: INTERNAL MEDICINE | Facility: CLINIC | Age: 75
End: 2025-05-12

## 2025-05-16 ENCOUNTER — OFFICE VISIT (OUTPATIENT)
Dept: ORTHOPEDICS | Facility: CLINIC | Age: 75
End: 2025-05-16
Payer: MEDICARE

## 2025-05-16 VITALS
HEART RATE: 88 BPM | BODY MASS INDEX: 28.41 KG/M2 | DIASTOLIC BLOOD PRESSURE: 95 MMHG | HEIGHT: 70 IN | WEIGHT: 198.44 LBS | SYSTOLIC BLOOD PRESSURE: 149 MMHG

## 2025-05-16 DIAGNOSIS — M25.561 ACUTE PAIN OF RIGHT KNEE: ICD-10-CM

## 2025-05-16 DIAGNOSIS — S80.01XA HEMATOMA OF RIGHT KNEE REGION: Primary | ICD-10-CM

## 2025-05-16 PROCEDURE — 3288F FALL RISK ASSESSMENT DOCD: CPT | Mod: CPTII,S$GLB,, | Performed by: PHYSICIAN ASSISTANT

## 2025-05-16 PROCEDURE — 3077F SYST BP >= 140 MM HG: CPT | Mod: CPTII,S$GLB,, | Performed by: PHYSICIAN ASSISTANT

## 2025-05-16 PROCEDURE — 1125F AMNT PAIN NOTED PAIN PRSNT: CPT | Mod: CPTII,S$GLB,, | Performed by: PHYSICIAN ASSISTANT

## 2025-05-16 PROCEDURE — 99999 PR PBB SHADOW E&M-EST. PATIENT-LVL IV: CPT | Mod: PBBFAC,,, | Performed by: PHYSICIAN ASSISTANT

## 2025-05-16 PROCEDURE — 99204 OFFICE O/P NEW MOD 45 MIN: CPT | Mod: S$GLB,,, | Performed by: PHYSICIAN ASSISTANT

## 2025-05-16 PROCEDURE — 3080F DIAST BP >= 90 MM HG: CPT | Mod: CPTII,S$GLB,, | Performed by: PHYSICIAN ASSISTANT

## 2025-05-16 PROCEDURE — 1100F PTFALLS ASSESS-DOCD GE2>/YR: CPT | Mod: CPTII,S$GLB,, | Performed by: PHYSICIAN ASSISTANT

## 2025-05-16 RX ORDER — DICLOFENAC SODIUM 10 MG/G
2 GEL TOPICAL 4 TIMES DAILY
Qty: 450 G | Refills: 0 | Status: SHIPPED | OUTPATIENT
Start: 2025-05-16 | End: 2025-06-06

## 2025-05-16 NOTE — PROGRESS NOTES
"Subjective:      Patient ID: Ezio James is a 74 y.o. male.    History of Present Illness    CHIEF COMPLAINT:  - Left knee pain and swelling    HPI:  Ezio presents for evaluation of his knee following a fall on April 28th. His knee initially had a stinging sensation for about a week when moving the joint. He noted significant bruising, describing a large circular bruise and some additional bruising in another area. The swelling has decreased but has not fully resolved. Currently, knee pain is minimal in the morning. However, his knee still swells slightly with activity.    He was seen by his primary care provider on May 9th, who prescribed a topical medication for his knee, but he did not receive it due to a pharmacy mix-up. He denies being on any anticoagulants other than baby aspirin.       Past Medical History:   Diagnosis Date    Atrial fibrillation, chronic     Chronic systolic congestive heart failure 5/4/2018    Colon polyp 04,15    Essential hypertension 6/14/2017    Pulmonary hypertension 11/1/2023    Subclinical hypothyroidism    Current Medications[1]    Review of patient's allergies indicates:  No Known Allergies    BP (!) 149/95 (BP Location: Left arm, Patient Position: Sitting)   Pulse 88   Ht 5' 10" (1.778 m)   Wt 90 kg (198 lb 6.6 oz)   BMI 28.47 kg/m²       Objective:    Ortho Exam   Right knee:   Skin is intact   Mild edema   No significant effusion   TTP diffusely   ROM: Extension 0°, flexion 105°   Calf and compartments are soft and compressible   Motor exam normal   Sensation and pulses intact  Cap refill brisk    GEN: Well developed, well nourished male. AAOX3. No acute distress.   Head: Normocephalic, atraumatic.   Eyes: YANN  Neck: Trachea is midline, no adenopathy  Resp: Breathing unlabored.  Neuro: Motor function normal, Cranial nerves intact  Psych: Mood and affect appropriate.    Assessment:     Imaging:  X-ray right knee obtained today was reviewed and shows minimal degenerative " changes of the right knee.  Soft tissue swelling anterior and superior to the patella.      1. Hematoma of right knee region    2. Acute pain of right knee        Plan:     Reviewed the radiographs with the patient.    Encouraged him that there are no significant findings other than the soft tissue swelling anterior and superior to the patella.    Explained that this is consistent with a hematoma.    Recommend the begin applying Voltaren topically 3-4 times daily.    Apply heat to the affected area.    Encouraged ROM as tolerated.    We will see him back in this clinic if symptoms worsen or fail to improve.      Orders Placed This Encounter    diclofenac sodium (VOLTAREN ARTHRITIS PAIN) 1 % Gel     Follow up if symptoms worsen or fail to improve.      Patient note was created using [employer] Dictation.  Any errors in syntax or even information may not have been identified and edited on initial review prior to signing this note.    This note was generated with the assistance of ambient listening technology. Verbal consent was obtained by the patient and accompanying visitor(s) for the recording of patient appointment to facilitate this note. I attest to having reviewed and edited the generated note for accuracy, though some syntax or spelling errors may persist. Please contact the author of this note for any clarification.         [1]   Current Outpatient Medications:     aspirin (ECOTRIN) 81 MG EC tablet, Take 1 tablet (81 mg total) by mouth once daily., Disp: 90 tablet, Rfl: 2    cholecalciferol, vitamin D3, 125 mcg (5,000 unit) Tab, Take 2,000 Units by mouth once daily., Disp: , Rfl:     cyanocobalamin (VITAMIN B-12) 1000 MCG tablet, Take 1,000 mcg by mouth once daily., Disp: , Rfl:     levothyroxine (SYNTHROID) 88 MCG tablet, TAKE 1 TABLET BEFORE BREAKFAST, Disp: 90 tablet, Rfl: 2    lisinopriL (PRINIVIL,ZESTRIL) 20 MG tablet, Take 1 tablet (20 mg total) by mouth once daily., Disp: 90 tablet, Rfl: 3    metoprolol  succinate (TOPROL-XL) 50 MG 24 hr tablet, TAKE 1 TABLET DAILY, Disp: 90 tablet, Rfl: 3    multivitamin capsule, Take 1 capsule by mouth once daily., Disp: , Rfl:     primidone (MYSOLINE) 50 MG Tab, Take 1 tablet (50 mg total) by mouth 3 (three) times daily., Disp: 270 tablet, Rfl: 3    rosuvastatin (CRESTOR) 10 MG tablet, TAKE 1 TABLET EVERY EVENING, Disp: 90 tablet, Rfl: 3    tamsulosin (FLOMAX) 0.4 mg Cap, Take 1 capsule (0.4 mg total) by mouth once daily., Disp: 90 capsule, Rfl: 3    diclofenac sodium (VOLTAREN ARTHRITIS PAIN) 1 % Gel, Apply 2 g topically 4 (four) times daily., Disp: 450 g, Rfl: 0

## 2025-05-19 ENCOUNTER — TELEPHONE (OUTPATIENT)
Dept: CARDIOLOGY | Facility: CLINIC | Age: 75
End: 2025-05-19
Payer: MEDICARE

## 2025-05-19 DIAGNOSIS — I10 ESSENTIAL HYPERTENSION: ICD-10-CM

## 2025-05-19 RX ORDER — LISINOPRIL 20 MG/1
20 TABLET ORAL DAILY
Qty: 90 TABLET | Refills: 3 | Status: SHIPPED | OUTPATIENT
Start: 2025-05-19 | End: 2026-05-19

## 2025-05-19 NOTE — TELEPHONE ENCOUNTER
----- Message from Zuleima sent at 5/19/2025 10:49 AM CDT -----  Contact: self  Patient is requesting a call back regarding going out of town asking for a 30 day lisinopriL (PRINIVIL,ZESTRIL) 20 MG tablet to be sent to local pharmacy. Please call back at [phone] [id number] - [facility] [address] - [address] AT [facility] [id number] [id number]: [phone] Fax: [phone]

## 2025-05-28 ENCOUNTER — HOSPITAL ENCOUNTER (OUTPATIENT)
Dept: CARDIOLOGY | Facility: HOSPITAL | Age: 75
Discharge: HOME | End: 2025-05-28
Attending: INTERNAL MEDICINE
Payer: MEDICARE

## 2025-05-28 VITALS
WEIGHT: 198 LBS | DIASTOLIC BLOOD PRESSURE: 95 MMHG | HEART RATE: 84 BPM | SYSTOLIC BLOOD PRESSURE: 149 MMHG | HEIGHT: 70 IN | BODY MASS INDEX: 28.35 KG/M2

## 2025-05-28 DIAGNOSIS — I48.20 ATRIAL FIBRILLATION, CHRONIC: ICD-10-CM

## 2025-05-28 DIAGNOSIS — I50.42 CHRONIC COMBINED SYSTOLIC AND DIASTOLIC CONGESTIVE HEART FAILURE: ICD-10-CM

## 2025-05-28 DIAGNOSIS — R94.31 ABNORMAL ECG: ICD-10-CM

## 2025-05-28 DIAGNOSIS — E66.3 OVERWEIGHT (BMI 25.0-29.9): ICD-10-CM

## 2025-05-28 DIAGNOSIS — I27.20 PULMONARY HYPERTENSION: ICD-10-CM

## 2025-05-28 DIAGNOSIS — E78.5 DYSLIPIDEMIA: ICD-10-CM

## 2025-05-28 DIAGNOSIS — I10 ESSENTIAL HYPERTENSION: ICD-10-CM

## 2025-05-28 DIAGNOSIS — R06.09 DOE (DYSPNEA ON EXERTION): ICD-10-CM

## 2025-05-28 DIAGNOSIS — I42.8 NON-ISCHEMIC CARDIOMYOPATHY: ICD-10-CM

## 2025-05-28 DIAGNOSIS — E78.6 LOW HDL (UNDER 40): ICD-10-CM

## 2025-05-28 DIAGNOSIS — I51.7 LAE (LEFT ATRIAL ENLARGEMENT): ICD-10-CM

## 2025-05-28 DIAGNOSIS — I34.0 NONRHEUMATIC MITRAL VALVE REGURGITATION: ICD-10-CM

## 2025-05-28 DIAGNOSIS — I70.0 AORTIC ATHEROSCLEROSIS: ICD-10-CM

## 2025-05-28 DIAGNOSIS — I51.7 LVH (LEFT VENTRICULAR HYPERTROPHY): ICD-10-CM

## 2025-05-28 LAB
AORTIC ROOT ANNULUS: 3.6 CM
AORTIC SIZE INDEX (SOV): 1.7 CM/M2
AORTIC SIZE INDEX: 1.7 CM/M2
ASCENDING AORTA: 3.6 CM
AV INDEX (PROSTH): 0.48
AV MEAN GRADIENT: 5 MMHG
AV PEAK GRADIENT: 9 MMHG
AV VALVE AREA BY VELOCITY RATIO: 1.8 CM²
AV VALVE AREA: 1.6 CM²
AV VELOCITY RATIO: 0.53
BSA FOR ECHO PROCEDURE: 2.11 M2
CV ECHO LV RWT: 0.42 CM
DOP CALC AO PEAK VEL: 1.5 M/S
DOP CALC AO VTI: 31.1 CM
DOP CALC LVOT AREA: 3.5 CM2
DOP CALC LVOT DIAMETER: 2.1 CM
DOP CALC LVOT PEAK VEL: 0.8 M/S
DOP CALC LVOT STROKE VOLUME: 51.2 CM3
DOP CALC RVOT PEAK VEL: 0.81 M/S
DOP CALC RVOT VTI: 14 CM
DOP CALCLVOT PEAK VEL VTI: 14.8 CM
E WAVE DECELERATION TIME: 187 MSEC
E/A RATIO: 2.25
E/E' RATIO: 9 M/S
ECHO LV POSTERIOR WALL: 1.1 CM (ref 0.6–1.1)
FRACTIONAL SHORTENING: 26.9 % (ref 28–44)
INTERVENTRICULAR SEPTUM: 1.1 CM (ref 0.6–1.1)
IVC DIAMETER: 1.71 CM
IVRT: 65 MSEC
LA MAJOR: 7.5 CM
LA MINOR: 6.6 CM
LA WIDTH: 5.6 CM
LEFT ATRIUM AREA SYSTOLIC (APICAL 2 CHAMBER): 33.42 CM2
LEFT ATRIUM AREA SYSTOLIC (APICAL 4 CHAMBER): 37.13 CM2
LEFT ATRIUM SIZE: 5.2 CM
LEFT ATRIUM VOLUME INDEX MOD: 69 ML/M2
LEFT ATRIUM VOLUME INDEX: 84 ML/M2
LEFT ATRIUM VOLUME MOD: 144 ML
LEFT ATRIUM VOLUME: 174 CM3
LEFT INTERNAL DIMENSION IN SYSTOLE: 3.8 CM (ref 2.1–4)
LEFT VENTRICLE DIASTOLIC VOLUME INDEX: 62.02 ML/M2
LEFT VENTRICLE DIASTOLIC VOLUME: 129 ML
LEFT VENTRICLE END SYSTOLIC VOLUME APICAL 2 CHAMBER: 131.06 ML
LEFT VENTRICLE END SYSTOLIC VOLUME APICAL 4 CHAMBER: 137.6 ML
LEFT VENTRICLE MASS INDEX: 106.1 G/M2
LEFT VENTRICLE SYSTOLIC VOLUME INDEX: 29.8 ML/M2
LEFT VENTRICLE SYSTOLIC VOLUME: 62 ML
LEFT VENTRICULAR INTERNAL DIMENSION IN DIASTOLE: 5.2 CM (ref 3.5–6)
LEFT VENTRICULAR MASS: 220.8 G
LV LATERAL E/E' RATIO: 7.4 M/S
LV SEPTAL E/E' RATIO: 11.6 M/S
LVED V (TEICH): 128.92 ML
LVES V (TEICH): 61.6 ML
LVOT MG: 1.54 MMHG
LVOT MV: 0.6 CM/S
MV PEAK A VEL: 0.36 M/S
MV PEAK E VEL: 0.81 M/S
MV STENOSIS PRESSURE HALF TIME: 54.23 MS
MV VALVE AREA P 1/2 METHOD: 4.06 CM2
OHS CV RV/LV RATIO: 0.83 CM
PISA TR MAX VEL: 2.6 M/S
PULM VEIN S/D RATIO: 0.75
PV MEAN GRADIENT: 1 MMHG
PV PEAK D VEL: 0.63 M/S
PV PEAK GRADIENT: 6 MMHG
PV PEAK S VEL: 0.47 M/S
PV PEAK VELOCITY: 1.2 M/S
RA MAJOR: 6.12 CM
RA PRESSURE ESTIMATED: 3 MMHG
RA WIDTH: 5.32 CM
RIGHT VENTRICLE DIASTOLIC BASEL DIMENSION: 4.3 CM
RIGHT VENTRICULAR END-DIASTOLIC DIMENSION: 4.27 CM
RV TB RVSP: 6 MMHG
SINUS: 3.6 CM
STJ: 3.4 CM
TDI LATERAL: 0.11 M/S
TDI SEPTAL: 0.07 M/S
TDI: 0.09 M/S
TR MAX PG: 28 MMHG
TRICUSPID ANNULAR PLANE SYSTOLIC EXCURSION: 1.9 CM
TV REST PULMONARY ARTERY PRESSURE: 30 MMHG
Z-SCORE OF LEFT VENTRICULAR DIMENSION IN END DIASTOLE: -2.03
Z-SCORE OF LEFT VENTRICULAR DIMENSION IN END SYSTOLE: -0.2

## 2025-05-28 PROCEDURE — 93306 TTE W/DOPPLER COMPLETE: CPT

## 2025-05-28 PROCEDURE — 93306 TTE W/DOPPLER COMPLETE: CPT | Mod: 26,,, | Performed by: INTERNAL MEDICINE

## 2025-06-06 ENCOUNTER — OFFICE VISIT (OUTPATIENT)
Dept: CARDIOLOGY | Facility: CLINIC | Age: 75
End: 2025-06-06
Payer: MEDICARE

## 2025-06-06 VITALS
WEIGHT: 199.63 LBS | OXYGEN SATURATION: 98 % | BODY MASS INDEX: 28.64 KG/M2 | DIASTOLIC BLOOD PRESSURE: 80 MMHG | HEART RATE: 82 BPM | SYSTOLIC BLOOD PRESSURE: 122 MMHG

## 2025-06-06 DIAGNOSIS — R29.898 LEG FATIGUE: ICD-10-CM

## 2025-06-06 DIAGNOSIS — I51.7 LVH (LEFT VENTRICULAR HYPERTROPHY): ICD-10-CM

## 2025-06-06 DIAGNOSIS — E66.3 OVERWEIGHT (BMI 25.0-29.9): ICD-10-CM

## 2025-06-06 DIAGNOSIS — I51.7 LAE (LEFT ATRIAL ENLARGEMENT): ICD-10-CM

## 2025-06-06 DIAGNOSIS — E78.5 DYSLIPIDEMIA: ICD-10-CM

## 2025-06-06 DIAGNOSIS — I27.20 PULMONARY HYPERTENSION: ICD-10-CM

## 2025-06-06 DIAGNOSIS — I10 ESSENTIAL HYPERTENSION: ICD-10-CM

## 2025-06-06 DIAGNOSIS — R06.09 DOE (DYSPNEA ON EXERTION): ICD-10-CM

## 2025-06-06 DIAGNOSIS — E78.6 LOW HDL (UNDER 40): ICD-10-CM

## 2025-06-06 DIAGNOSIS — I70.0 AORTIC ATHEROSCLEROSIS: ICD-10-CM

## 2025-06-06 DIAGNOSIS — R94.39 ABNORMAL NUCLEAR STRESS TEST: ICD-10-CM

## 2025-06-06 DIAGNOSIS — I42.8 NON-ISCHEMIC CARDIOMYOPATHY: ICD-10-CM

## 2025-06-06 DIAGNOSIS — I48.20 ATRIAL FIBRILLATION, CHRONIC: Primary | ICD-10-CM

## 2025-06-06 DIAGNOSIS — R94.31 ABNORMAL ECG: ICD-10-CM

## 2025-06-06 DIAGNOSIS — I50.42 CHRONIC COMBINED SYSTOLIC AND DIASTOLIC CONGESTIVE HEART FAILURE: ICD-10-CM

## 2025-06-06 DIAGNOSIS — I34.0 NONRHEUMATIC MITRAL VALVE REGURGITATION: ICD-10-CM

## 2025-06-06 DIAGNOSIS — R41.89 SLUGGISHNESS: ICD-10-CM

## 2025-06-06 DIAGNOSIS — R60.9 EDEMA, UNSPECIFIED TYPE: ICD-10-CM

## 2025-06-06 DIAGNOSIS — R41.3 OTHER AMNESIA: ICD-10-CM

## 2025-06-06 PROCEDURE — 99999 PR PBB SHADOW E&M-EST. PATIENT-LVL II: CPT | Mod: PBBFAC,,, | Performed by: INTERNAL MEDICINE

## 2025-06-16 ENCOUNTER — HOSPITAL ENCOUNTER (OUTPATIENT)
Dept: RADIOLOGY | Facility: HOSPITAL | Age: 75
Discharge: HOME | End: 2025-06-16
Attending: INTERNAL MEDICINE
Payer: MEDICARE

## 2025-06-16 ENCOUNTER — HOSPITAL ENCOUNTER (OUTPATIENT)
Dept: CARDIOLOGY | Facility: HOSPITAL | Age: 75
Discharge: HOME | End: 2025-06-16
Attending: INTERNAL MEDICINE
Payer: MEDICARE

## 2025-06-16 ENCOUNTER — RESULTS FOLLOW-UP (OUTPATIENT)
Dept: CARDIOLOGY | Facility: CLINIC | Age: 75
End: 2025-06-16

## 2025-06-16 VITALS
SYSTOLIC BLOOD PRESSURE: 119 MMHG | HEIGHT: 70 IN | BODY MASS INDEX: 28.49 KG/M2 | DIASTOLIC BLOOD PRESSURE: 67 MMHG | WEIGHT: 199 LBS

## 2025-06-16 VITALS
HEIGHT: 70 IN | BODY MASS INDEX: 28.49 KG/M2 | SYSTOLIC BLOOD PRESSURE: 122 MMHG | WEIGHT: 199 LBS | DIASTOLIC BLOOD PRESSURE: 80 MMHG

## 2025-06-16 DIAGNOSIS — I48.20 ATRIAL FIBRILLATION, CHRONIC: ICD-10-CM

## 2025-06-16 DIAGNOSIS — I50.42 CHRONIC COMBINED SYSTOLIC AND DIASTOLIC CONGESTIVE HEART FAILURE: ICD-10-CM

## 2025-06-16 DIAGNOSIS — I70.0 AORTIC ATHEROSCLEROSIS: ICD-10-CM

## 2025-06-16 DIAGNOSIS — R41.89 SLUGGISHNESS: ICD-10-CM

## 2025-06-16 DIAGNOSIS — I42.8 NON-ISCHEMIC CARDIOMYOPATHY: ICD-10-CM

## 2025-06-16 DIAGNOSIS — R41.3 OTHER AMNESIA: ICD-10-CM

## 2025-06-16 DIAGNOSIS — R29.898 LEG FATIGUE: ICD-10-CM

## 2025-06-16 DIAGNOSIS — I51.7 LVH (LEFT VENTRICULAR HYPERTROPHY): ICD-10-CM

## 2025-06-16 LAB
LEFT ABI: 1.45
LEFT ANT TIBIAL SYS PSV: 54 CM/S
LEFT ARM BP: 116 MMHG
LEFT CFA PSV: 74 CM/S
LEFT DORSALIS PEDIS: 143 MMHG
LEFT PERONEAL SYS PSV: 41 CM/S
LEFT POPLITEAL PSV: 32 CM/S
LEFT POST TIBIAL SYS PSV: 66 CM/S
LEFT POSTERIOR TIBIAL: 172 MMHG
LEFT PROFUNDA SYS PSV: 65 CM/S
LEFT SUPER FEMORAL DIST SYS PSV: 52 CM/S
LEFT SUPER FEMORAL MID SYS PSV: 58 CM/S
LEFT SUPER FEMORAL OSTIAL SYS PSV: 77 CM/S
LEFT SUPER FEMORAL PROX SYS PSV: 76 CM/S
LEFT TBI: 0.86
LEFT TIB/PER TRUNK SYS PSV: 34 CM/S
LEFT TOE PRESSURE: 102 MMHG
OHS CV LEFT LOWER EXTREMITY ABI (NO CALC): 1.45
OHS CV RIGHT ABI LOWER EXTREMITY (NO CALC): 1.36
RIGHT ABI: 1.36
RIGHT ANT TIBIAL SYS PSV: 64 CM/S
RIGHT ARM BP: 119 MMHG
RIGHT CFA PSV: 82 CM/S
RIGHT DORSALIS PEDIS: 162 MMHG
RIGHT PERONEAL SYS PSV: 55 CM/S
RIGHT POPLITEAL PSV: 31 CM/S
RIGHT POST TIBIAL SYS PSV: 61 CM/S
RIGHT POSTERIOR TIBIAL: 162 MMHG
RIGHT PROFUNDA SYS PSV: 44 CM/S
RIGHT SUPER FEMORAL DIST SYS PSV: 81 CM/S
RIGHT SUPER FEMORAL MID SYS PSV: 73 CM/S
RIGHT SUPER FEMORAL OSTIAL SYS PSV: 68 CM/S
RIGHT SUPER FEMORAL PROX SYS PSV: 89 CM/S
RIGHT TBI: 1.02
RIGHT TIB/PER TRUNK SYS PSV: 38 CM/S
RIGHT TOE PRESSURE: 121 MMHG

## 2025-06-16 PROCEDURE — 93925 LOWER EXTREMITY STUDY: CPT

## 2025-06-16 PROCEDURE — 93925 LOWER EXTREMITY STUDY: CPT | Mod: 26,,, | Performed by: INTERNAL MEDICINE

## 2025-06-16 PROCEDURE — 70450 CT HEAD/BRAIN W/O DYE: CPT | Mod: TC

## 2025-06-16 PROCEDURE — 93922 UPR/L XTREMITY ART 2 LEVELS: CPT

## 2025-06-16 PROCEDURE — 93922 UPR/L XTREMITY ART 2 LEVELS: CPT | Mod: 26,,, | Performed by: INTERNAL MEDICINE

## 2025-06-16 PROCEDURE — 70450 CT HEAD/BRAIN W/O DYE: CPT | Mod: 26,,, | Performed by: RADIOLOGY

## 2025-07-29 DIAGNOSIS — E03.9 HYPOTHYROIDISM, UNSPECIFIED TYPE: ICD-10-CM

## 2025-07-29 RX ORDER — LEVOTHYROXINE SODIUM 88 UG/1
88 TABLET ORAL
Qty: 90 TABLET | Refills: 2 | Status: SHIPPED | OUTPATIENT
Start: 2025-07-29

## 2025-07-29 NOTE — TELEPHONE ENCOUNTER
Refill Decision Note   Ezio James  is requesting a refill authorization.  Brief Assessment and Rationale for Refill:  Approve     Medication Therapy Plan: FREE T4-WNL      Comments:     Note composed:8:13 AM 07/29/2025

## 2025-07-29 NOTE — TELEPHONE ENCOUNTER
No care due was identified.  [facility] Embedded Care Due Messages. Reference number: [id number].   7/29/2025 12:16:16 AM CDT

## 2025-08-25 DIAGNOSIS — I10 ESSENTIAL HYPERTENSION: ICD-10-CM

## 2025-08-25 RX ORDER — LISINOPRIL 20 MG/1
20 TABLET ORAL DAILY
Qty: 90 TABLET | Refills: 3 | Status: SHIPPED | OUTPATIENT
Start: 2025-08-25 | End: 2026-08-25